# Patient Record
Sex: MALE | Race: BLACK OR AFRICAN AMERICAN | NOT HISPANIC OR LATINO | Employment: FULL TIME | URBAN - METROPOLITAN AREA
[De-identification: names, ages, dates, MRNs, and addresses within clinical notes are randomized per-mention and may not be internally consistent; named-entity substitution may affect disease eponyms.]

---

## 2017-05-03 DIAGNOSIS — I10 HYPERTENSION GOAL BP (BLOOD PRESSURE) < 140/90: ICD-10-CM

## 2017-05-03 RX ORDER — LISINOPRIL AND HYDROCHLOROTHIAZIDE 20; 25 MG/1; MG/1
TABLET ORAL
Qty: 30 TABLET | Refills: 0 | Status: SHIPPED | OUTPATIENT
Start: 2017-05-03 | End: 2017-05-19

## 2017-05-03 NOTE — TELEPHONE ENCOUNTER
Medication is being filled for 1 time refill only due to:  Patient needs to be seen because it has been more than one year since last visit.   Nereida OSEGUERA RN

## 2017-05-03 NOTE — TELEPHONE ENCOUNTER
Pending Prescriptions:                       Disp   Refills    lisinopril-hydrochlorothiazide (PRINZIDE/*90 tab*2            Sig: TAKE ONE TABLET BY MOUTH ONE TIME DAILY        Last Written Prescription Date: 5/2/2016  Last Fill Quantity: 90, # refills: 3  Last Office Visit with [facility], [facility] or [facility] prescribing provider: 5/2/2016       Potassium   Date Value Ref Range Status   05/02/2016 3.8 3.4 - 5.3 mmol/L Final     Creatinine   Date Value Ref Range Status   05/02/2016 0.81 0.66 - 1.25 mg/dL Final     BP Readings from Last 3 Encounters:   05/02/16 130/68   06/01/15 110/70   05/05/14 112/76

## 2017-05-19 ENCOUNTER — OFFICE VISIT (OUTPATIENT)
Dept: FAMILY MEDICINE | Facility: CLINIC | Age: 62
End: 2017-05-19
Payer: COMMERCIAL

## 2017-05-19 VITALS
TEMPERATURE: 97.2 F | WEIGHT: 198 LBS | HEIGHT: 68 IN | HEART RATE: 88 BPM | BODY MASS INDEX: 30.01 KG/M2 | DIASTOLIC BLOOD PRESSURE: 70 MMHG | OXYGEN SATURATION: 98 % | SYSTOLIC BLOOD PRESSURE: 120 MMHG | RESPIRATION RATE: 16 BRPM

## 2017-05-19 DIAGNOSIS — I10 HYPERTENSION GOAL BP (BLOOD PRESSURE) < 140/90: ICD-10-CM

## 2017-05-19 DIAGNOSIS — N52.1 ERECTILE DYSFUNCTION DUE TO DISEASES CLASSIFIED ELSEWHERE: ICD-10-CM

## 2017-05-19 DIAGNOSIS — Z86.0100 HISTORY OF COLONIC POLYPS: ICD-10-CM

## 2017-05-19 DIAGNOSIS — Z00.00 ROUTINE HISTORY AND PHYSICAL EXAMINATION OF ADULT: Primary | ICD-10-CM

## 2017-05-19 PROCEDURE — 86803 HEPATITIS C AB TEST: CPT | Performed by: FAMILY MEDICINE

## 2017-05-19 PROCEDURE — 36415 COLL VENOUS BLD VENIPUNCTURE: CPT | Performed by: FAMILY MEDICINE

## 2017-05-19 PROCEDURE — 99396 PREV VISIT EST AGE 40-64: CPT | Performed by: FAMILY MEDICINE

## 2017-05-19 PROCEDURE — 80069 RENAL FUNCTION PANEL: CPT | Performed by: FAMILY MEDICINE

## 2017-05-19 RX ORDER — SILDENAFIL 100 MG/1
50-100 TABLET, FILM COATED ORAL DAILY PRN
Qty: 6 TABLET | Refills: 2 | Status: SHIPPED | OUTPATIENT
Start: 2017-05-19 | End: 2020-07-06

## 2017-05-19 RX ORDER — LISINOPRIL AND HYDROCHLOROTHIAZIDE 20; 25 MG/1; MG/1
1 TABLET ORAL DAILY
Qty: 90 TABLET | Refills: 3 | Status: SHIPPED | OUTPATIENT
Start: 2017-05-19 | End: 2018-05-21

## 2017-05-19 NOTE — NURSING NOTE
"Chief Complaint   Patient presents with     Physical     initial /70 (BP Location: Left arm, Cuff Size: Adult Regular)  Pulse 88  Temp 97.2  F (36.2  C) (Oral)  Resp 16  Ht 5' 8.25\" (1.734 m)  Wt 198 lb (89.8 kg)  SpO2 98%  BMI 29.89 kg/m2 Estimated body mass index is 29.89 kg/(m^2) as calculated from the following:    Height as of this encounter: 5' 8.25\" (1.734 m).    Weight as of this encounter: 198 lb (89.8 kg).  BP completed using cuff size: regular.  L  arm      Health Maintenance that is potentially due pending provider review:  Will do colonoscopy this year, told him about the free classes for health care directive    n/a    Gabriel George ma  "

## 2017-05-19 NOTE — MR AVS SNAPSHOT
After Visit Summary   5/19/2017    Edd Bazan    MRN: [id number]           Patient Information     Date Of Birth          1955        Visit Information        Provider Department      5/19/2017 11:00 AM Tay Oshea MD [facility]        Today's Diagnoses     Routine history and physical examination of adult    -  1    Hypertension goal BP (blood pressure) < 140/90        History of colonic polyps        Erectile dysfunction due to diseases classified elsewhere          Care Instructions      Preventive Health Recommendations  Male Ages 50 - 64    Yearly exam:             See your health care provider every year in order to  o   Review health changes.   o   Discuss preventive care.    o   Review your medicines if your doctor has prescribed any.     Have a cholesterol test every 5 years, or more frequently if you are at risk for high cholesterol/heart disease.     Have a diabetes test (fasting glucose) every three years. If you are at risk for diabetes, you should have this test more often.     Have a colonoscopy at age 50, or have a yearly FIT test (stool test). These exams will check for colon cancer.      Talk with your health care provider about whether or not a prostate cancer screening test (PSA) is right for you.    You should be tested each year for STDs (sexually transmitted diseases), if you re at risk.     Shots: Get a flu shot each year. Get a tetanus shot every 10 years.     Nutrition:    Eat at least 5 servings of fruits and vegetables daily.     Eat whole-grain bread, whole-wheat pasta and brown rice instead of white grains and rice.     Talk to your provider about Calcium and Vitamin D.     Lifestyle    Exercise for at least 150 minutes a week (30 minutes a day, 5 days a week). This will help you control your weight and prevent disease.     Limit alcohol to one drink per day.     No smoking.     Wear sunscreen to prevent skin cancer.     See your  dentist every six months for an exam and cleaning.     See your eye doctor every 1 to 2 years.          Follow-ups after your visit        Additional Services     GASTROENTEROLOGY ADULT REF PROCEDURE ONLY       Last Lab Result: Creatinine (mg/dL)       Date                     Value                 05/02/2016               0.81             ----------  Body mass index is 29.89 kg/(m^2).     Needed:  No  Language:  English    Patient will be contacted to schedule procedure.     Please be aware that coverage of these services is subject to the terms and limitations of your health insurance plan.  Call member services at your health plan with any benefit or coverage questions.  Any procedures must be performed at a [place] facility OR coordinated by your clinic's referral office.    Please bring the following with you to your appointment:    (1) Any X-Rays, CTs or MRIs which have been performed.  Contact the facility where they were done to arrange for  prior to your scheduled appointment.    (2) List of current medications   (3) This referral request   (4) Any documents/labs given to you for this referral                  Who to contact     If you have questions or need follow up information about today's clinic visit or your schedule please contact [facility] directly at [phone].  Normal or non-critical lab and imaging results will be communicated to you by MyChart, letter or phone within 4 business days after the clinic has received the results. If you do not hear from us within 7 days, please contact the clinic through MyChart or phone. If you have a critical or abnormal lab result, we will notify you by phone as soon as possible.  Submit refill requests through [facility] or call your pharmacy and they will forward the refill request to us. Please allow 3 business days for your refill to be completed.          Additional Information About Your Visit        MyChart Information      "Trot lets you send messages to your doctor, view your test results, renew your prescriptions, schedule appointments and more. To sign up, go to [email or web address] . Click on \"Log in\" on the left side of the screen, which will take you to the Welcome page. Then click on \"Sign up Now\" on the right side of the page.     You will be asked to enter the access code listed below, as well as some personal information. Please follow the directions to create your username and password.     Your access code is: [email or web address]  Expires: 2017 11:33 AM     Your access code will  in 90 days. If you need help or a new code, please call your [place] clinic or [phone].        Care EveryWhere ID     This is your [facility] EveryWhere ID. This could be used by other organizations to access your [place] medical records  [id number]        Your Vitals Were     Pulse Temperature Respirations Height Pulse Oximetry BMI (Body Mass Index)    88 97.2  F (36.2  C) (Oral) 16 5' 8.25\" (1.734 m) 98% 29.89 kg/m2       Blood Pressure from Last 3 Encounters:   17 120/70   16 130/68   06/01/15 110/70    Weight from Last 3 Encounters:   17 198 lb (89.8 kg)   16 202 lb (91.6 kg)   06/01/15 199 lb 8 oz (90.5 kg)              We Performed the Following     GASTROENTEROLOGY ADULT REF PROCEDURE ONLY     Hepatitis C Screen Reflex to HCV RNA Quant and Genotype     Renal panel (Alb, BUN, Ca, Cl, CO2, Creat, Gluc, Phos, K, Na)          Today's Medication Changes          These changes are accurate as of: 17 11:33 AM.  If you have any questions, ask your nurse or doctor.               These medicines have changed or have updated prescriptions.        Dose/Directions    lisinopril-hydrochlorothiazide 20-25 MG per tablet   Commonly known as:  PRINZIDE/ZESTORETIC   This may have changed:  See the new instructions.   Used for:  Hypertension goal BP (blood pressure) < 140/90   Changed by:  Tay Oshea MD    "     Dose:  1 tablet   Take 1 tablet by mouth daily   Quantity:  90 tablet   Refills:  3            Where to get your medicines      These medications were sent to [facility] [address] [facility] - [place], [place] - [address]  [address], [place] [address]     Phone:  [phone]     lisinopril-hydrochlorothiazide 20-25 MG per tablet    sildenafil 100 MG cap/tab                Primary Care Provider Office Phone # Fax #    Tay Dennis Oshea [phone]       [facility] [address] [facility] [address]        Thank you!     Thank you for choosing [facility]  for your care. Our goal is always to provide you with excellent care. Hearing back from our patients is one way we can continue to improve our services. Please take a few minutes to complete the written survey that you may receive in the mail after your visit with us. Thank you!             Your Updated Medication List - Protect others around you: Learn how to safely use, store and throw away your medicines at www.disposemymeds.org.          This list is accurate as of: 5/19/17 11:33 AM.  Always use your most recent med list.                   Brand Name Dispense Instructions for use    aspirin 325 MG EC tablet      Take 325 mg by mouth daily.       COMPLETE MULTI-PURPOSE      1 daily       FIBER PO      Take  by mouth.       FISH OIL PO      Take  by mouth.       lisinopril-hydrochlorothiazide 20-25 MG per tablet    PRINZIDE/ZESTORETIC    90 tablet    Take 1 tablet by mouth daily       sildenafil 100 MG cap/tab    VIAGRA    6 tablet    Take 0.5-1 tablets ( mg) by mouth daily as needed for erectile dysfunction Take 30 min to 4 hours before intercourse.       STOOL SOFTENER PO      Take  by mouth.       Vitamin D 02602 UNIT Caps      every week on friday

## 2017-05-19 NOTE — PROGRESS NOTES
SUBJECTIVE:     CC: Edd Bazan is an 62 year old male who presents for preventative health visit.     Healthy Habits:    Do you get at least three servings of calcium containing foods daily (dairy, green leafy vegetables, etc.)? yes    Amount of exercise or daily activities, outside of work: 4-5 day(s) per week    Problems taking medications regularly No    Medication side effects: No    Have you had an eye exam in the past two years? yes    Do you see a dentist twice per year? yes    Do you have sleep apnea, excessive snoring or daytime drowsiness?no      PROBLEMS TO ADD ON...    Today's PHQ-2 Score:   PHQ-2 ( 1999 [employer]) 5/19/2017 6/1/2015   Q1: Little interest or pleasure in doing things 0 0   Q2: Feeling down, depressed or hopeless 0 0   PHQ-2 Score 0 0       Abuse: Current or Past(Physical, Sexual or Emotional)- No  Do you feel safe in your environment - Yes    Social History   Substance Use Topics     Smoking status: Current Some Day Smoker     Types: Cigars     Smokeless tobacco: Never Used      Comment: occ     Alcohol use Yes      Comment: a few times a week     The patient does not drink >3 drinks per day nor >7 drinks per week.    Last PSA: No results found for: PSA    Recent Labs   Lab Test  06/11/12   0920  05/31/11   1647   CHOL  193  201*   HDL  49  48   LDL  114   --    TRIG  150   --    CHOLHDLRATIO  3.9   --        Reviewed orders with patient. Reviewed health maintenance and updated orders accordingly - Yes    Reviewed and updated as needed this visit by clinical staff  Tobacco  Allergies  Meds         Reviewed and updated as needed this visit by Provider            STD screening:  History   Sexual Activity     Sexual activity: No     no screening desired today    Overall patient has been feeling well and needs follow-up for history of colon polyps as well as recheck for high blood pressure medication which has been stable    ROS: As per HPI.  Constitutional: no recent illness, no  fevers/sweats/chills, sleep normal  Eyes: No vision changes or eye irritation  Ears/Nose/Throat: No runny nose, sore throat or ear pain  CV: no palpitations, no chest pain, no lower extremity swelling.  Resp: no shortness of breath, wheezing, or cough.  GI: no nausea/vomiting/diarrhea, normal stooling pattern, no reflux symptoms, no black or bloody stools  : no burning or urgency with urination, no blood in urine, no sores or discharge.  Skin: no changing moles or other lesions, no rash  Musculoskeletal: no joint pain, muscle pain, weakness, trauma or injury  Psych: no depression, no concerns about anxiety  Neuro: no new headaches, dizziness    I have reviewed and updated the patient's past medical, social and family history in the EMR. Current problems are:  Patient Active Problem List    Diagnosis Date Noted     Erectile dysfunction due to diseases classified elsewhere 05/02/2016     Priority: Medium     Impingement syndrome of shoulder region 06/01/2015     Priority: Medium     Advanced directives, counseling/discussion 05/29/2012     Priority: Medium     Discussed advance care planning with patient; however, patient declined at this time. 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     Priority: Medium     Family Hx:  Family History   Problem Relation Age of Onset     HEART DISEASE Father      Breast Cancer Mother      Social History:  Social History   Substance Use Topics     Smoking status: Current Some Day Smoker     Types: Cigars     Smokeless tobacco: Never Used      Comment: occ     Alcohol use Yes      Comment: a few times a week     Social History     Social History Narrative     Allergies:     Allergies   Allergen Reactions     No Known Drug Allergies       Current Medications:  Current Outpatient Prescriptions   Medication Sig  "Dispense Refill     lisinopril-hydrochlorothiazide (PRINZIDE/ZESTORETIC) 20-25 MG per tablet TAKE ONE TABLET BY MOUTH ONE TIME DAILY 30 tablet 0     sildenafil (VIAGRA) 100 MG tablet Take 0.5-1 tablets ( mg) by mouth daily as needed for erectile dysfunction Take 30 min to 4 hours before intercourse. 6 tablet 2     Omega-3 Fatty Acids (FISH OIL PO) Take  by mouth.       Docusate Calcium (STOOL SOFTENER PO) Take  by mouth.       aspirin 325 MG EC tablet Take 325 mg by mouth daily.       FIBER PO Take  by mouth.       COMPLETE MULTI-PURPOSE 1 daily       VITAMIN D 64234 UNIT OR CAPS every week on friday          Objective:  /70 (BP Location: Left arm, Cuff Size: Adult Regular)  Pulse 88  Temp 97.2  F (36.2  C) (Oral)  Resp 16  Ht 5' 8.25\" (1.734 m)  Wt 198 lb (89.8 kg)  SpO2 98%  BMI 29.89 kg/m2  General Appearance: Pleasant, alert, WN/WD in no acute respiratory distress.  Head Exam: Normal. Normocephalic, atraumatic. No sinus tenderness.  Eye Exam: Normal external eye, conjunctiva, lids. ARLEY.  Ear Exam: Normal auditory canals and external ears. Non-tender.  Left TM-normal. Right TM-normal.  OroPharynx Exam: Dental hygiene adequate. Normal buccal mucosa. Normal pharynx.  Neck Exam: Supple, no masses or enlarged, tender nodes.  Thyroid Exam: No nodules or enlargement or tenderness.  Chest/Respiratory Exam: Normal, comfortable, easy respirations. Chest wall normal. Lungs are clear to auscultation. No wheezing, crackles, or rhonchi.  Cardiovascular Exam: Regular rate and rhythm. No murmur, gallop, or rubs. No pedal edema.  Gastrointestinal Exam: Soft, non-tender, no masses or organomegaly.  Musculoskeletal Exam: Back is non-tender, full ROM of upper and lower extremities.  Skin: no rash, warm and dry.  Neurologic Exam: Nonfocal, no tremor. Normal gait.  Psychiatric Exam: Alert - appropriate, normal affect    ASSESSMENT/PLAN:    ICD-10-CM    1. Routine history and physical examination of adult Z00.00 " "Renal panel (Alb, BUN, Ca, Cl, CO2, Creat, Gluc, Phos, K, Na)     Hepatitis C Screen Reflex to HCV RNA Quant and Genotype   2. Hypertension goal BP (blood pressure) < 140/90 I10 lisinopril-hydrochlorothiazide (PRINZIDE/ZESTORETIC) 20-25 MG per tablet     Renal panel (Alb, BUN, Ca, Cl, CO2, Creat, Gluc, Phos, K, Na)     Hepatitis C Screen Reflex to HCV RNA Quant and Genotype   3. History of colonic polyps Z86.010 GASTROENTEROLOGY ADULT REF PROCEDURE ONLY   4. Erectile dysfunction due to diseases classified elsewhere N52.1 sildenafil (VIAGRA) 100 MG cap/tab       COUNSELING:  Reviewed preventive health counseling, as reflected in patient instructions       Regular exercise       Healthy diet/nutrition       Consider Hep C screening for patients born between 1945 and 1965       Colon cancer screening       Prostate cancer screening   Discussed the risks and benefits of prostate cancer screening via PSA, current recommendation from the USPSTF is again screening  But this could be modified by individual risk or family history  Patient declined screening today    Also discussed screening for cholesterol patient declines  The ASCVD Risk score (Lynsey VICKY Jr, et al., 2013) failed to calculate for the following reasons:    Cannot find a previous HDL lab    Cannot find a previous total cholesterol lab         reports that he has been smoking Cigars.  He has never used smokeless tobacco.  Tobacco Cessation Action Plan: Information offered: Patient not interested at this time  Estimated body mass index is 29.89 kg/(m^2) as calculated from the following:    Height as of this encounter: 5' 8.25\" (1.734 m).    Weight as of this encounter: 198 lb (89.8 kg).   Weight management plan: Discussed healthy diet and exercise guidelines and patient will follow up in 12 months in clinic to re-evaluate.    Counseling Resources:  ATP IV Guidelines  Pooled Cohorts Equation Calculator  FRAX Risk Assessment  ICSI Preventive Guidelines  Dietary " Guidelines for Americans, 2010  USDA's MyPlate  ASA Prophylaxis  Lung CA Screening    Tay Dennis Oshea MD  [facility]

## 2017-05-19 NOTE — LETTER
[facility]  [address], [address]  [place], [place] [address] [phone]     May 22, 2017     Edd Bazan                                                                                                                             [address] [facility] [address]        Dear Edd,    It was nice to see you at your recent visit.  Enclosed are the lab results taken at that time.  Results for orders placed or performed in visit on 05/19/17   Renal panel (Alb, BUN, Ca, Cl, CO2, Creat, Gluc, Phos, K, Na)   Result Value Ref Range    Sodium 140 133 - 144 mmol/L    Potassium 3.9 3.4 - 5.3 mmol/L    Chloride 104 94 - 109 mmol/L    Carbon Dioxide 32 20 - 32 mmol/L    Anion Gap 4 3 - 14 mmol/L    Glucose 84 70 - 99 mg/dL    Urea Nitrogen 15 7 - 30 mg/dL    Creatinine 0.68 0.66 - 1.25 mg/dL    Calcium 9.1 8.5 - 10.1 mg/dL    Phosphorus 2.9 2.5 - 4.5 mg/dL    Albumin 4.0 3.4 - 5.0 g/dL   Hepatitis C Screen Reflex to HCV RNA Quant and Genotype   Result Value Ref Range    Hepatitis C Antibody  NR     Negative       Your test results are normal.  Follow up as needed or in 1 year.    Sincerely,    Tay Oshea MD MPH

## 2017-05-20 LAB
ALBUMIN SERPL-MCNC: 4 G/DL (ref 3.4–5)
ANION GAP SERPL CALCULATED.3IONS-SCNC: 4 MMOL/L (ref 3–14)
BUN SERPL-MCNC: 15 MG/DL (ref 7–30)
CALCIUM SERPL-MCNC: 9.1 MG/DL (ref 8.5–10.1)
CHLORIDE SERPL-SCNC: 104 MMOL/L (ref 94–109)
CO2 SERPL-SCNC: 32 MMOL/L (ref 20–32)
CREAT SERPL-MCNC: 0.68 MG/DL (ref 0.66–1.25)
GFR SERPL CREATININE-BSD FRML MDRD: NORMAL ML/MIN/1.7M2
GLUCOSE SERPL-MCNC: 84 MG/DL (ref 70–99)
PHOSPHATE SERPL-MCNC: 2.9 MG/DL (ref 2.5–4.5)
POTASSIUM SERPL-SCNC: 3.9 MMOL/L (ref 3.4–5.3)
SODIUM SERPL-SCNC: 140 MMOL/L (ref 133–144)

## 2017-05-22 LAB — HCV AB SERPL QL IA: NORMAL

## 2017-07-15 ENCOUNTER — HEALTH MAINTENANCE LETTER (OUTPATIENT)
Age: 62
End: 2017-07-15

## 2017-11-28 ENCOUNTER — TELEPHONE (OUTPATIENT)
Dept: FAMILY MEDICINE | Facility: CLINIC | Age: 62
End: 2017-11-28

## 2017-11-28 NOTE — TELEPHONE ENCOUNTER
11/28/2017    Call Regarding Preventive Health Screening Colonoscopy    Attempt 1    Message on voicemail     Comments:       Outreach   [place]

## 2018-01-12 NOTE — TELEPHONE ENCOUNTER
1/12/2018    Call Regarding Preventive Health Screening Colonoscopy    Attempt 2    Message on voicemail     Comments:           Outreach   [place]

## 2018-02-03 NOTE — TELEPHONE ENCOUNTER
2/3/2018    Call Regarding Preventive Health Screening Colonoscopy    Attempt 3    Message on voicemail     Comments:           Outreach   AT

## 2018-05-21 ENCOUNTER — OFFICE VISIT (OUTPATIENT)
Dept: FAMILY MEDICINE | Facility: CLINIC | Age: 63
End: 2018-05-21
Payer: COMMERCIAL

## 2018-05-21 VITALS
WEIGHT: 198.2 LBS | HEART RATE: 87 BPM | HEIGHT: 68 IN | BODY MASS INDEX: 30.04 KG/M2 | TEMPERATURE: 98 F | SYSTOLIC BLOOD PRESSURE: 123 MMHG | DIASTOLIC BLOOD PRESSURE: 78 MMHG | OXYGEN SATURATION: 98 %

## 2018-05-21 DIAGNOSIS — I10 HYPERTENSION GOAL BP (BLOOD PRESSURE) < 140/90: ICD-10-CM

## 2018-05-21 DIAGNOSIS — N52.1 ERECTILE DYSFUNCTION DUE TO DISEASES CLASSIFIED ELSEWHERE: ICD-10-CM

## 2018-05-21 DIAGNOSIS — Z12.11 SPECIAL SCREENING FOR MALIGNANT NEOPLASMS, COLON: ICD-10-CM

## 2018-05-21 DIAGNOSIS — Z00.00 ROUTINE HISTORY AND PHYSICAL EXAMINATION OF ADULT: Primary | ICD-10-CM

## 2018-05-21 LAB
ALBUMIN SERPL-MCNC: 4.2 G/DL (ref 3.4–5)
ANION GAP SERPL CALCULATED.3IONS-SCNC: 9 MMOL/L (ref 3–14)
BUN SERPL-MCNC: 20 MG/DL (ref 7–30)
CALCIUM SERPL-MCNC: 9.4 MG/DL (ref 8.5–10.1)
CHLORIDE SERPL-SCNC: 99 MMOL/L (ref 94–109)
CHOLEST SERPL-MCNC: 203 MG/DL
CO2 SERPL-SCNC: 28 MMOL/L (ref 20–32)
CREAT SERPL-MCNC: 0.83 MG/DL (ref 0.66–1.25)
GFR SERPL CREATININE-BSD FRML MDRD: >90 ML/MIN/1.7M2
GLUCOSE SERPL-MCNC: 90 MG/DL (ref 70–99)
HDLC SERPL-MCNC: 62 MG/DL
LDLC SERPL CALC-MCNC: 124 MG/DL
NONHDLC SERPL-MCNC: 141 MG/DL
PHOSPHATE SERPL-MCNC: 2.9 MG/DL (ref 2.5–4.5)
POTASSIUM SERPL-SCNC: 3.5 MMOL/L (ref 3.4–5.3)
SODIUM SERPL-SCNC: 136 MMOL/L (ref 133–144)
TRIGL SERPL-MCNC: 83 MG/DL

## 2018-05-21 PROCEDURE — 99396 PREV VISIT EST AGE 40-64: CPT | Performed by: FAMILY MEDICINE

## 2018-05-21 PROCEDURE — 36415 COLL VENOUS BLD VENIPUNCTURE: CPT | Performed by: FAMILY MEDICINE

## 2018-05-21 PROCEDURE — 80069 RENAL FUNCTION PANEL: CPT | Performed by: FAMILY MEDICINE

## 2018-05-21 PROCEDURE — 80061 LIPID PANEL: CPT | Performed by: FAMILY MEDICINE

## 2018-05-21 RX ORDER — LISINOPRIL AND HYDROCHLOROTHIAZIDE 20; 25 MG/1; MG/1
1 TABLET ORAL DAILY
Qty: 90 TABLET | Refills: 3 | Status: SHIPPED | OUTPATIENT
Start: 2018-05-21 | End: 2019-05-23

## 2018-05-21 RX ORDER — SILDENAFIL CITRATE 20 MG/1
TABLET ORAL
Qty: 50 TABLET | Refills: 11 | Status: SHIPPED | OUTPATIENT
Start: 2018-05-21 | End: 2019-05-23

## 2018-05-21 NOTE — MR AVS SNAPSHOT
After Visit Summary   5/21/2018    Edd Bazan    MRN: [id number]           Patient Information     Date Of Birth          1955        Visit Information        Provider Department      5/21/2018 9:30 AM Tay Oshea MD [facility]        Today's Diagnoses     Routine history and physical examination of adult    -  1    Hypertension goal BP (blood pressure) < 140/90        Special screening for malignant neoplasms, colon        Erectile dysfunction due to diseases classified elsewhere          Care Instructions      Preventive Health Recommendations  Male Ages 50 - 64    Yearly exam:             See your health care provider every year in order to  o   Review health changes.   o   Discuss preventive care.    o   Review your medicines if your doctor has prescribed any.     Have a cholesterol test every 5 years, or more frequently if you are at risk for high cholesterol/heart disease.     Have a diabetes test (fasting glucose) every three years. If you are at risk for diabetes, you should have this test more often.     Have a colonoscopy at age 50, or have a yearly FIT test (stool test). These exams will check for colon cancer.      Talk with your health care provider about whether or not a prostate cancer screening test (PSA) is right for you.    You should be tested each year for STDs (sexually transmitted diseases), if you re at risk.     Shots: Get a flu shot each year. Get a tetanus shot every 10 years.     Nutrition:    Eat at least 5 servings of fruits and vegetables daily.     Eat whole-grain bread, whole-wheat pasta and brown rice instead of white grains and rice.     Talk to your provider about Calcium and Vitamin D.     Lifestyle    Exercise for at least 150 minutes a week (30 minutes a day, 5 days a week). This will help you control your weight and prevent disease.     Limit alcohol to one drink per day.     No smoking.     Wear sunscreen to prevent skin  cancer.     See your dentist every six months for an exam and cleaning.     See your eye doctor every 1 to 2 years.            Follow-ups after your visit        Additional Services     GASTROENTEROLOGY ADULT REF PROCEDURE ONLY Marge Adame [phone]; Dr. KEELEY Mcnamara       Last Lab Result: Creatinine (mg/dL)       Date                     Value                 05/19/2017               0.68             ----------  Body mass index is 29.92 kg/(m^2).      Patient will be contacted to schedule procedure.     Please be aware that coverage of these services is subject to the terms and limitations of your health insurance plan.  Call member services at your health plan with any benefit or coverage questions.  Any procedures must be performed at a [facility] facility OR coordinated by your clinic's referral office.    Please bring the following with you to your appointment:    (1) Any X-Rays, CTs or MRIs which have been performed.  Contact the facility where they were done to arrange for  prior to your scheduled appointment.    (2) List of current medications   (3) This referral request   (4) Any documents/labs given to you for this referral                  Follow-up notes from your care team     Return in about 1 year (around 5/21/2019), or if symptoms worsen or fail to improve.      Your next 10 appointments already scheduled     Jul 17, 2018   Procedure with Nicholas Mcnamara MD   [facility] ([facility])    [address]  [place] [id number] [phone]           [facility] is located at [address] [place]              Who to contact     If you have questions or need follow up information about today's clinic visit or your schedule please contact [facility] directly at [phone].  Normal or non-critical lab and imaging results will be communicated to you by MyChart, letter or phone within 4 business days after the clinic has  "received the results. If you do not hear from us within 7 days, please contact the clinic through [employer] or phone. If you have a critical or abnormal lab result, we will notify you by phone as soon as possible.  Submit refill requests through [employer] or call your pharmacy and they will forward the refill request to us. Please allow 3 business days for your refill to be completed.          Additional Information About Your Visit        [employer] Information     [employer] lets you send messages to your doctor, view your test results, renew your prescriptions, schedule appointments and more. To sign up, go to [email or web address] . Click on \"Log in\" on the left side of the screen, which will take you to the Welcome page. Then click on \"Sign up Now\" on the right side of the page.     You will be asked to enter the access code listed below, as well as some personal information. Please follow the directions to create your username and password.     Your access code is: [email or web address]  Expires: 2018 10:02 AM     Your access code will  in 90 days. If you need help or a new code, please call your [facility] clinic or [phone].        Care EveryWhere ID     This is your Care EveryWhere ID. This could be used by other organizations to access your [facility] medical records  [id number]        Your Vitals Were     Pulse Temperature Height Pulse Oximetry BMI (Body Mass Index)       87 98  F (36.7  C) (Oral) 5' 8.25\" (1.734 m) 98% 29.92 kg/m2        Blood Pressure from Last 3 Encounters:   18 123/78   17 120/70   16 130/68    Weight from Last 3 Encounters:   18 198 lb 3.2 oz (89.9 kg)   17 198 lb (89.8 kg)   16 202 lb (91.6 kg)              We Performed the Following     GASTROENTEROLOGY ADULT REF PROCEDURE ONLY Marge Adame [phone]; Dr. KEELEY Mcnamara     Lipid panel reflex to direct LDL Non-fasting     Renal panel (Alb, BUN, Ca, Cl, CO2, Creat, Gluc, Phos, K, Na)        "   Today's Medication Changes          These changes are accurate as of 5/21/18 11:59 PM.  If you have any questions, ask your nurse or doctor.               Start taking these medicines.        Dose/Directions    sildenafil 20 MG tablet   Commonly known as:  REVATIO   Used for:  Erectile dysfunction due to diseases classified elsewhere   Started by:  Tay Oshea MD        Take 2-5 tablets as needed for sexual intercourse   Quantity:  50 tablet   Refills:  11            Where to get your medicines      These medications were sent to [place] [address] [place], [place] - [facility]  [address], [place] [address]     Phone:  [phone]     lisinopril-hydrochlorothiazide 20-25 MG per tablet         Some of these will need a paper prescription and others can be bought over the counter.  Ask your nurse if you have questions.     Bring a paper prescription for each of these medications     sildenafil 20 MG tablet                Primary Care Provider Office Phone # Fax #    Tay Oshea [phone] [address] [facility] [address]        Equal Access to Services     [facility] : Hadii ankur ku hadasho Soomaali, waaxda luqadaha, qaybta kaalmada adeegyada, waxay idiin haynatanaeln meng pedro . So [facility] [phone].    ATENCIÓN: Si habla español, tiene a sinclair disposición servicios gratuitos de asistencia lingüística. Llame al [phone].    We comply with applicable federal civil rights laws and [place] laws. We do not discriminate on the basis of race, color, national origin, age, disability, sex, sexual orientation, or gender identity.            Thank you!     Thank you for choosing [facility]  for your care. Our goal is always to provide you with excellent care. Hearing back from our patients is one way we can continue to improve our services. Please take a few minutes to complete the written survey that you may receive in the  mail after your visit with us. Thank you!             Your Updated Medication List - Protect others around you: Learn how to safely use, store and throw away your medicines at www.disposemymeds.org.          This list is accurate as of 5/21/18 11:59 PM.  Always use your most recent med list.                   Brand Name Dispense Instructions for use Diagnosis    aspirin 325 MG EC tablet      Take 325 mg by mouth daily.        COMPLETE MULTI-PURPOSE      1 daily        FIBER PO      Take  by mouth.        FISH OIL PO      Take  by mouth.        lisinopril-hydrochlorothiazide 20-25 MG per tablet    PRINZIDE/ZESTORETIC    90 tablet    Take 1 tablet by mouth daily    Hypertension goal BP (blood pressure) < 140/90       sildenafil 100 MG tablet    VIAGRA    6 tablet    Take 0.5-1 tablets ( mg) by mouth daily as needed for erectile dysfunction Take 30 min to 4 hours before intercourse.    Erectile dysfunction due to diseases classified elsewhere       sildenafil 20 MG tablet    REVATIO    50 tablet    Take 2-5 tablets as needed for sexual intercourse    Erectile dysfunction due to diseases classified elsewhere       STOOL SOFTENER PO      Take  by mouth.        Vitamin D 04343 UNIT Caps      every week on friday

## 2018-05-21 NOTE — LETTER
Edd Bazan  [address] [facility] [address]      Dear Edd    It was nice to see you at your recent visit.  Enclosed are the lab results taken at that time.  Your test results are normal.  Follow up as needed or in 1 year.    Sincerely,    Tay Oshea MD MPH

## 2018-05-21 NOTE — NURSING NOTE
"Chief Complaint   Patient presents with     Physical     /78  Pulse 87  Temp 98  F (36.7  C) (Oral)  Ht 5' 8.25\" (1.734 m)  Wt 198 lb 3.2 oz (89.9 kg)  SpO2 98%  BMI 29.92 kg/m2 Estimated body mass index is 29.92 kg/(m^2) as calculated from the following:    Height as of this encounter: 5' 8.25\" (1.734 m).    Weight as of this encounter: 198 lb 3.2 oz (89.9 kg).  Medication Reconciliation: complete      Health Maintenance that is potentially due pending provider review:  Colonoscopy/FIT    Pt will schedule Colonoscopy/FIT appt.    JONI Wells  "

## 2018-05-21 NOTE — PROGRESS NOTES
SUBJECTIVE:   CC: Edd Bazan is an 63 year old male who presents for preventative health visit.     Physical   Annual:     Getting at least 3 servings of Calcium per day::  Yes    Bi-annual eye exam::  Yes    Dental care twice a year::  NO    Sleep apnea or symptoms of sleep apnea::  None    Diet::  Regular (no restrictions)    Frequency of exercise::  1 day/week    Duration of exercise::  15-30 minutes    Taking medications regularly::  Yes    Medication side effects::  None    Additional concerns today::  No                  Today's PHQ-2 Score:   PHQ-2 ( 1999 [employer]) 5/21/2018   Q1: Little interest or pleasure in doing things 0   Q2: Feeling down, depressed or hopeless 0   PHQ-2 Score 0   Q1: Little interest or pleasure in doing things Not at all   Q2: Feeling down, depressed or hopeless Not at all   PHQ-2 Score 0       Abuse: Current or Past(Physical, Sexual or Emotional)- No  Do you feel safe in your environment - Yes    Social History   Substance Use Topics     Smoking status: Current Some Day Smoker     Types: Cigars     Smokeless tobacco: Never Used      Comment: occ     Alcohol use Yes      Comment: a few times a week     Alcohol Use 5/21/2018   If you drink alcohol do you typically have greater than 3 drinks per day OR greater than 7 drinks per week? No   No flowsheet data found.    Last PSA: No results found for: PSA    Reviewed orders with patient. Reviewed health maintenance and updated orders accordingly - Yes  Labs reviewed in EPIC  BP Readings from Last 3 Encounters:   05/21/18 123/78   05/19/17 120/70   05/02/16 130/68    Wt Readings from Last 3 Encounters:   05/21/18 198 lb 3.2 oz (89.9 kg)   05/19/17 198 lb (89.8 kg)   05/02/16 202 lb (91.6 kg)                  Patient Active Problem List   Diagnosis     Heterotopic tissue     CARDIOVASCULAR SCREENING; LDL GOAL LESS THAN 130     Hypertension goal BP (blood pressure) < 140/90     Advanced directives, counseling/discussion     Impingement  syndrome of shoulder region     Erectile dysfunction due to diseases classified elsewhere     No past surgical history on file.    Social History   Substance Use Topics     Smoking status: Current Some Day Smoker     Types: Cigars     Smokeless tobacco: Never Used      Comment: occ     Alcohol use Yes      Comment: a few times a week     Family History   Problem Relation Age of Onset     HEART DISEASE Father      Breast Cancer Mother          Current Outpatient Prescriptions   Medication Sig Dispense Refill     aspirin 325 MG EC tablet Take 325 mg by mouth daily.       COMPLETE MULTI-PURPOSE 1 daily       Docusate Calcium (STOOL SOFTENER PO) Take  by mouth.       FIBER PO Take  by mouth.       lisinopril-hydrochlorothiazide (PRINZIDE/ZESTORETIC) 20-25 MG per tablet Take 1 tablet by mouth daily 90 tablet 3     Omega-3 Fatty Acids (FISH OIL PO) Take  by mouth.       sildenafil (REVATIO) 20 MG tablet Take 2-5 tablets as needed for sexual intercourse 50 tablet 11     sildenafil (VIAGRA) 100 MG cap/tab Take 0.5-1 tablets ( mg) by mouth daily as needed for erectile dysfunction Take 30 min to 4 hours before intercourse. 6 tablet 2     VITAMIN D 83787 UNIT OR CAPS every week on friday       Allergies   Allergen Reactions     No Known Drug Allergies        Reviewed and updated as needed this visit by clinical staff         Reviewed and updated as needed this visit by Provider            Review of Systems  CONSTITUTIONAL: NEGATIVE for fever, chills, change in weight  INTEGUMENTARY/SKIN: NEGATIVE for worrisome rashes, moles or lesions  EYES: NEGATIVE for vision changes or irritation  ENT: NEGATIVE for ear, mouth and throat problems  RESP: NEGATIVE for significant cough or SOB  CV: NEGATIVE for chest pain, palpitations or peripheral edema  GI: NEGATIVE for nausea, abdominal pain, heartburn, or change in bowel habits   male: negative for dysuria, hematuria, decreased urinary stream, erectile dysfunction, urethral  "discharge  MUSCULOSKELETAL: NEGATIVE for significant arthralgias or myalgia  NEURO: NEGATIVE for weakness, dizziness or paresthesias  PSYCHIATRIC: NEGATIVE for changes in mood or affect    OBJECTIVE:   /78  Pulse 87  Temp 98  F (36.7  C) (Oral)  Ht 5' 8.25\" (1.734 m)  Wt 198 lb 3.2 oz (89.9 kg)  SpO2 98%  BMI 29.92 kg/m2    Physical Exam    General Appearance: Pleasant, alert, in no acute respiratory distress.  Head Exam: Normal. Normocephalic, atraumatic. No sinus tenderness.  Eye Exam: Normal external eye, conjunctiva, lids. ARLEY.  Ear Exam: Normal auditory canals and external ears. Non-tender.  Left TM-normal. Right TM-normal.  OroPharynx Exam: Dental hygiene adequate. Normal buccal mucosa. Normal pharynx.  Neck Exam: Supple, no masses or enlarged, tender nodes.  Thyroid Exam: No nodules or enlargement or tenderness.  Chest/Respiratory Exam: Normal, comfortable, easy respirations. Chest wall normal. Lungs are clear to auscultation. No wheezing, crackles, or rhonchi.  Cardiovascular Exam: Regular rate and rhythm. No murmur, gallop, or rubs. No pedal edema.  Gastrointestinal Exam: Soft, non-tender, no masses or organomegaly.  Musculoskeletal Exam: Back is non-tender, full ROM of upper and lower extremities.  Skin: no rash, warm and dry.    Neurologic Exam: Nonfocal, no tremor. Normal gait.  Psychiatric Exam: Alert - appropriate, normal affect      ASSESSMENT/PLAN:       ICD-10-CM    1. Routine history and physical examination of adult Z00.00    2. Hypertension goal BP (blood pressure) < 140/90 I10 lisinopril-hydrochlorothiazide (PRINZIDE/ZESTORETIC) 20-25 MG per tablet     Renal panel (Alb, BUN, Ca, Cl, CO2, Creat, Gluc, Phos, K, Na)     Lipid panel reflex to direct LDL Non-fasting   3. Special screening for malignant neoplasms, colon Z12.11 GASTROENTEROLOGY ADULT REF PROCEDURE ONLY Justinraul Adame [phone]; Dr. KEELEY Mcnamara   4. Erectile dysfunction due to diseases classified elsewhere N52.1 " "sildenafil (REVATIO) 20 MG tablet     Stable erectile dysfunction, related to HTN discussed the use of sildenafil tablets    Stable hypertension    COUNSELING:   Reviewed preventive health counseling, as reflected in patient instructions       Regular exercise       Healthy diet/nutrition       Safe sex practices/STD prevention         reports that he has been smoking Cigars.  He has never used smokeless tobacco.  Tobacco Cessation Action Plan: Information offered: Patient not interested at this time  Estimated body mass index is 29.89 kg/(m^2) as calculated from the following:    Height as of 5/19/17: 5' 8.25\" (1.734 m).    Weight as of 5/19/17: 198 lb (89.8 kg).   Weight management plan: Discussed healthy diet and exercise guidelines and patient will follow up in 12 months in clinic to re-evaluate.    Counseling Resources:  ATP IV Guidelines  Pooled Cohorts Equation Calculator  FRAX Risk Assessment  ICSI Preventive Guidelines  Dietary Guidelines for Americans, 2010  USDA's MyPlate  ASA Prophylaxis  Lung CA Screening    Tay Oshea MD  [facility]  "

## 2018-07-17 ENCOUNTER — HOSPITAL ENCOUNTER (OUTPATIENT)
Facility: CLINIC | Age: 63
Discharge: HOME | End: 2018-07-17
Attending: SPECIALIST | Admitting: SPECIALIST
Payer: COMMERCIAL

## 2018-07-17 ENCOUNTER — SURGERY (OUTPATIENT)
Age: 63
End: 2018-07-17

## 2018-07-17 ENCOUNTER — TRANSFERRED RECORDS (OUTPATIENT)
Dept: HEALTH INFORMATION MANAGEMENT | Facility: CLINIC | Age: 63
End: 2018-07-17

## 2018-07-17 VITALS
WEIGHT: 190 LBS | OXYGEN SATURATION: 100 % | HEIGHT: 69 IN | RESPIRATION RATE: 30 BRPM | BODY MASS INDEX: 28.14 KG/M2 | DIASTOLIC BLOOD PRESSURE: 80 MMHG | HEART RATE: 78 BPM | SYSTOLIC BLOOD PRESSURE: 119 MMHG

## 2018-07-17 LAB — COLONOSCOPY: NORMAL

## 2018-07-17 PROCEDURE — 99153 MOD SED SAME PHYS/QHP EA: CPT | Performed by: SPECIALIST

## 2018-07-17 PROCEDURE — 88305 TISSUE EXAM BY PATHOLOGIST: CPT | Performed by: SPECIALIST

## 2018-07-17 PROCEDURE — 25000128 H RX IP 250 OP 636: Performed by: SPECIALIST

## 2018-07-17 PROCEDURE — 45385 COLONOSCOPY W/LESION REMOVAL: CPT | Performed by: SPECIALIST

## 2018-07-17 PROCEDURE — 88305 TISSUE EXAM BY PATHOLOGIST: CPT | Mod: 26 | Performed by: SPECIALIST

## 2018-07-17 PROCEDURE — G0500 MOD SEDAT ENDO SERVICE >5YRS: HCPCS | Performed by: SPECIALIST

## 2018-07-17 RX ORDER — LIDOCAINE 40 MG/G
CREAM TOPICAL
Status: DISCONTINUED | OUTPATIENT
Start: 2018-07-17 | End: 2018-07-17 | Stop reason: HOSPADM

## 2018-07-17 RX ORDER — ONDANSETRON 2 MG/ML
4 INJECTION INTRAMUSCULAR; INTRAVENOUS
Status: DISCONTINUED | OUTPATIENT
Start: 2018-07-17 | End: 2018-07-17 | Stop reason: HOSPADM

## 2018-07-17 RX ORDER — FENTANYL CITRATE 50 UG/ML
INJECTION, SOLUTION INTRAMUSCULAR; INTRAVENOUS PRN
Status: DISCONTINUED | OUTPATIENT
Start: 2018-07-17 | End: 2018-07-17 | Stop reason: HOSPADM

## 2018-07-17 RX ADMIN — FENTANYL CITRATE 50 MCG: 50 INJECTION, SOLUTION INTRAMUSCULAR; INTRAVENOUS at 08:39

## 2018-07-17 RX ADMIN — MIDAZOLAM 1 MG: 1 INJECTION INTRAMUSCULAR; INTRAVENOUS at 08:42

## 2018-07-17 RX ADMIN — FENTANYL CITRATE 50 MCG: 50 INJECTION, SOLUTION INTRAMUSCULAR; INTRAVENOUS at 08:42

## 2018-07-17 RX ADMIN — MIDAZOLAM 1 MG: 1 INJECTION INTRAMUSCULAR; INTRAVENOUS at 08:39

## 2018-07-17 NOTE — BRIEF OP NOTE
[facility] Brief Operative Note    Pre-operative diagnosis: screening   Post-operative diagnosis polyps, diverticulosis     Procedure: Procedure(s):  colonoscopy - Wound Class: II-Clean Contaminated   Surgeon(s): Surgeon(s) and Role:     * Nicholas Mcnamara MD - Primary   Estimated blood loss: * No values recorded between 7/17/2018 12:00 AM and 7/17/2018  9:17 AM *    Specimens:   ID Type Source Tests Collected by Time Destination   A : polyps x 2, hot snare Polyp Large Intestine, Cecum SURGICAL PATHOLOGY EXAM Pallavi Worthington RN 7/17/2018  9:00 AM    B : hot snare Polyp Large Intestine, Left/Descending SURGICAL PATHOLOGY EXAM Pallavi Worthington RN 7/17/2018  9:06 AM       Findings: Please see ProVation procedure note in Chart Review

## 2018-07-18 LAB — COPATH REPORT: NORMAL

## 2019-05-23 ENCOUNTER — OFFICE VISIT (OUTPATIENT)
Dept: FAMILY MEDICINE | Facility: CLINIC | Age: 64
End: 2019-05-23
Payer: COMMERCIAL

## 2019-05-23 VITALS
HEART RATE: 93 BPM | OXYGEN SATURATION: 98 % | SYSTOLIC BLOOD PRESSURE: 119 MMHG | TEMPERATURE: 97.9 F | HEIGHT: 69 IN | BODY MASS INDEX: 30.66 KG/M2 | WEIGHT: 207 LBS | DIASTOLIC BLOOD PRESSURE: 77 MMHG | RESPIRATION RATE: 17 BRPM

## 2019-05-23 DIAGNOSIS — Z00.00 ROUTINE HISTORY AND PHYSICAL EXAMINATION OF ADULT: Primary | ICD-10-CM

## 2019-05-23 DIAGNOSIS — Z13.220 LIPID SCREENING: ICD-10-CM

## 2019-05-23 DIAGNOSIS — I10 HYPERTENSION GOAL BP (BLOOD PRESSURE) < 140/90: ICD-10-CM

## 2019-05-23 DIAGNOSIS — E78.5 HYPERLIPIDEMIA WITH TARGET LDL LESS THAN 100: ICD-10-CM

## 2019-05-23 DIAGNOSIS — N52.1 ERECTILE DYSFUNCTION DUE TO DISEASES CLASSIFIED ELSEWHERE: ICD-10-CM

## 2019-05-23 LAB
ANION GAP SERPL CALCULATED.3IONS-SCNC: 6 MMOL/L (ref 3–14)
BUN SERPL-MCNC: 15 MG/DL (ref 7–30)
CALCIUM SERPL-MCNC: 9.5 MG/DL (ref 8.5–10.1)
CHLORIDE SERPL-SCNC: 106 MMOL/L (ref 94–109)
CHOLEST SERPL-MCNC: 194 MG/DL
CO2 SERPL-SCNC: 27 MMOL/L (ref 20–32)
CREAT SERPL-MCNC: 1.08 MG/DL (ref 0.66–1.25)
GFR SERPL CREATININE-BSD FRML MDRD: 72 ML/MIN/{1.73_M2}
GLUCOSE SERPL-MCNC: 104 MG/DL (ref 70–99)
HDLC SERPL-MCNC: 61 MG/DL
LDLC SERPL CALC-MCNC: 116 MG/DL
NONHDLC SERPL-MCNC: 133 MG/DL
POTASSIUM SERPL-SCNC: 3.9 MMOL/L (ref 3.4–5.3)
SODIUM SERPL-SCNC: 139 MMOL/L (ref 133–144)
TRIGL SERPL-MCNC: 86 MG/DL

## 2019-05-23 PROCEDURE — 80061 LIPID PANEL: CPT | Performed by: FAMILY MEDICINE

## 2019-05-23 PROCEDURE — 36415 COLL VENOUS BLD VENIPUNCTURE: CPT | Performed by: FAMILY MEDICINE

## 2019-05-23 PROCEDURE — 99397 PER PM REEVAL EST PAT 65+ YR: CPT | Performed by: FAMILY MEDICINE

## 2019-05-23 PROCEDURE — 80048 BASIC METABOLIC PNL TOTAL CA: CPT | Performed by: FAMILY MEDICINE

## 2019-05-23 RX ORDER — SILDENAFIL CITRATE 20 MG/1
TABLET ORAL
Qty: 50 TABLET | Refills: 11 | Status: SHIPPED | OUTPATIENT
Start: 2019-05-23 | End: 2020-07-06

## 2019-05-23 RX ORDER — LISINOPRIL AND HYDROCHLOROTHIAZIDE 20; 25 MG/1; MG/1
1 TABLET ORAL DAILY
Qty: 90 TABLET | Refills: 3 | Status: SHIPPED | OUTPATIENT
Start: 2019-05-23 | End: 2020-05-12

## 2019-05-23 RX ORDER — ATORVASTATIN CALCIUM 10 MG/1
10 TABLET, FILM COATED ORAL DAILY
Qty: 90 TABLET | Refills: 3 | Status: SHIPPED | OUTPATIENT
Start: 2019-05-23 | End: 2020-05-12

## 2019-05-23 ASSESSMENT — MIFFLIN-ST. JEOR: SCORE: 1714.33

## 2019-05-23 ASSESSMENT — ACTIVITIES OF DAILY LIVING (ADL): CURRENT_FUNCTION: NO ASSISTANCE NEEDED

## 2019-05-23 NOTE — PROGRESS NOTES
"SUBJECTIVE:   Edd Bazan is a 64 year old male who presents for Preventive Visit.      Are you in the first 12 months of your Medicare coverage?  No    Healthy Habits:     In general, how would you rate your overall health?  Good    Frequency of exercise:  4-5 days/week    Duration of exercise:  30-45 minutes    Do you usually eat at least 4 servings of fruit and vegetables a day, include whole grains    & fiber and avoid regularly eating high fat or \"junk\" foods?  Yes    Taking medications regularly:  Yes    Medication side effects:  None    Ability to successfully perform activities of daily living:  No assistance needed    Home Safety:  No safety concerns identified    Hearing Impairment:  No hearing concerns    In the past 6 months, have you been bothered by leaking of urine?  No    In general, how would you rate your overall mental or emotional health?  Good      PHQ-2 Total Score: 0    Additional concerns today:  No    Do you feel safe in your environment? Yes    Do you have a Health Care Directive? No: Advance care planning reviewed with patient; information given to patient to review.    Fall risk       Cognitive Screening   1) Repeat 3 items (Leader, Season, Table)    2) Clock draw: NORMAL  3) 3 item recall: Recalls 3 objects  Results: 3 items recalled: COGNITIVE IMPAIRMENT LESS LIKELY    Mini-CogTM Copyright S Patt. Licensed by the author for use in [facility]; reprinted with permission ([email or web address]@.[email or web address]). All rights reserved.      Do you have sleep apnea, excessive snoring or daytime drowsiness?: no    Reviewed and updated as needed this visit by clinical staff         Reviewed and updated as needed this visit by Provider        Social History     Tobacco Use     Smoking status: Current Some Day Smoker     Types: Cigars     Smokeless tobacco: Never Used     Tobacco comment: occ   Substance Use Topics     Alcohol use: Yes     Comment: a few times a week         Alcohol Use 5/21/2018 "   Prescreen: >3 drinks/day or >7 drinks/week? No   Prescreen: >3 drinks/day or >7 drinks/week? -       Has stable HTN  Needs a refill  The 10-year ASCVD risk score (Summit Argolizeth PERRY Jr., et al., 2013) is: 22%    Values used to calculate the score:      Age: 64 years      Sex: Male      Is Non- : Yes      Diabetic: No      Tobacco smoker: Yes      Systolic Blood Pressure: 119 mmHg      Is BP treated: Yes      HDL Cholesterol: 62 mg/dL      Total Cholesterol: 203 mg/dL    I recommend he add a statin for primary prevention, he will try it      Current providers sharing in care for this patient include:   Patient Care Team:  Tay Oshea MD as PCP - General (Family Practice)  Tay Oshea MD as Assigned PCP    The following health maintenance items are reviewed in Epic and correct as of today:  Health Maintenance   Topic Date Due     FALL RISK ASSESSMENT  1955     HIV SCREENING  05/02/1969     ZOSTER IMMUNIZATION (1 of 2) 05/02/2004     LIPID  04/16/2008     ADVANCED DIRECTIVE PLANNING  05/29/2017     DTAP/TDAP/TD IMMUNIZATION (2 - Td) 09/17/2018     PHQ-2  01/01/2019     PNEUMOCOCCAL IMMUNIZATION 65+ LOW/MEDIUM RISK (1 of 2 - PCV13) 05/02/2019     AORTIC ANEURYSM SCREENING (SYSTEM ASSIGNED)  05/02/2019     MEDICARE ANNUAL WELLNESS VISIT  05/21/2019     INFLUENZA VACCINE (Season Ended) 09/01/2019     COLONOSCOPY  07/17/2021     HEPATITIS C SCREENING  Completed     IPV IMMUNIZATION  Aged Out     MENINGITIS IMMUNIZATION  Aged Out     Lab work is in process  Labs reviewed in EPIC  BP Readings from Last 3 Encounters:   05/23/19 119/77   07/17/18 119/80   05/21/18 123/78    Wt Readings from Last 3 Encounters:   05/23/19 93.9 kg (207 lb)   07/17/18 86.2 kg (190 lb)   05/21/18 89.9 kg (198 lb 3.2 oz)                  Patient Active Problem List   Diagnosis     Heterotopic tissue     CARDIOVASCULAR SCREENING; LDL GOAL LESS THAN 130     Hypertension goal BP (blood pressure) < 140/90      Advanced directives, counseling/discussion     Impingement syndrome of shoulder region     Erectile dysfunction due to diseases classified elsewhere     History reviewed. No pertinent surgical history.    Social History     Tobacco Use     Smoking status: Current Some Day Smoker     Types: Cigars     Smokeless tobacco: Never Used     Tobacco comment: occ   Substance Use Topics     Alcohol use: Yes     Comment: a few times a week     Family History   Problem Relation Age of Onset     Heart Disease Father      Breast Cancer Mother          Current Outpatient Medications   Medication Sig Dispense Refill     aspirin 325 MG EC tablet Take 325 mg by mouth daily.       COMPLETE MULTI-PURPOSE 1 daily       Docusate Calcium (STOOL SOFTENER PO) Take  by mouth.       FIBER PO Take  by mouth.       lisinopril-hydrochlorothiazide (PRINZIDE/ZESTORETIC) 20-25 MG per tablet Take 1 tablet by mouth daily 90 tablet 3     Omega-3 Fatty Acids (FISH OIL PO) Take  by mouth.       sildenafil (REVATIO) 20 MG tablet Take 2-5 tablets as needed for sexual intercourse 50 tablet 11     sildenafil (VIAGRA) 100 MG cap/tab Take 0.5-1 tablets ( mg) by mouth daily as needed for erectile dysfunction Take 30 min to 4 hours before intercourse. 6 tablet 2     VITAMIN D 70247 UNIT OR CAPS every week on friday       Allergies   Allergen Reactions     No Known Drug Allergies      Immunization History   Administered Date(s) Administered     TDAP Vaccine (Adacel) 09/17/2008       Review of Systems    Constitutional: no recent illness, no fevers/sweats/chills  Eyes: No vision changes or eye irritation  Ears/Nose/Throat: No runny nose, sore throat or ear pain  CV: no palpitations, no chest pain, no lower extremity swelling.  Resp: no shortness of breath, wheezing, or cough.  GI: no nausea/vomiting/diarrhea, no black or bloody stools  : no burning or urgency with urination  Skin: no rash  Musculoskeletal: no joint pain, muscle pain, weakness  Psych:  "no depression, no concerns about anxiety  Neuro: no new headaches, dizziness, numbness, or tingling      OBJECTIVE:   /77   Pulse 93   Temp 97.9  F (36.6  C) (Oral)   Resp 17   Ht 1.753 m (5' 9\")   Wt 93.9 kg (207 lb)   SpO2 98%   BMI 30.57 kg/m   Estimated body mass index is 30.57 kg/m  as calculated from the following:    Height as of this encounter: 1.753 m (5' 9\").    Weight as of this encounter: 93.9 kg (207 lb).  Physical Exam    General Appearance: Pleasant, alert, in no acute respiratory distress.  Head Exam: Normal. Normocephalic, atraumatic. No sinus tenderness.  Eye Exam: Normal external eye, conjunctiva, lids. ARLEY.  Ear Exam: Normal auditory canals and external ears. Non-tender.  Left TM-normal. Right TM-normal.  OroPharynx Exam: Dental hygiene adequate. Normal buccal mucosa. Normal pharynx.  Neck Exam: Supple, no masses or enlarged, tender nodes.  Thyroid Exam: No nodules or enlargement or tenderness.  Chest/Respiratory Exam: Normal, comfortable, easy respirations. Chest wall normal. Lungs are clear to auscultation. No wheezing, crackles, or rhonchi.  Cardiovascular Exam: Regular rate and rhythm. No murmur, gallop, or rubs. No pedal edema.  Gastrointestinal Exam: Soft, non-tender, no masses or organomegaly.  Musculoskeletal Exam: Back is non-tender, full ROM of upper and lower extremities.  Skin: no rash, warm and dry.    Neurologic Exam: Nonfocal, no tremor. Normal gait.  Psychiatric Exam: Alert - appropriate, normal affect      ASSESSMENT / PLAN:       ICD-10-CM    1. Routine history and physical examination of adult Z00.00    2. Hypertension goal BP (blood pressure) < 140/90 I10        End of Life Planning:  Patient currently has an advanced directive: Yes.  Practitioner is supportive of decision.    COUNSELING:  Reviewed preventive health counseling, as reflected in patient instructions       Regular exercise       Healthy diet/nutrition       Colon cancer screening       Prostate " "cancer screening    Estimated body mass index is 28.06 kg/m  as calculated from the following:    Height as of 7/17/18: 1.753 m (5' 9\").    Weight as of 7/17/18: 86.2 kg (190 lb).    Weight management plan: Discussed healthy diet and exercise guidelines     reports that he has been smoking cigars.  He has never used smokeless tobacco.  Tobacco Cessation Action Plan: Information offered: Patient not interested at this time    Appropriate preventive services were discussed with this patient, including applicable screening as appropriate for cardiovascular disease, diabetes, osteopenia/osteoporosis, and glaucoma.  As appropriate for age/gender, discussed screening for colorectal cancer, prostate cancer, breast cancer, and cervical cancer. Checklist reviewing preventive services available has been given to the patient.    Reviewed patients plan of care and provided an AVS. The Intermediate Care Plan ( asthma action plan, low back pain action plan, and migraine action plan) for Edd meets the Care Plan requirement. This Care Plan has been established and reviewed with the Patient.    Counseling Resources:  ATP IV Guidelines  Pooled Cohorts Equation Calculator  Breast Cancer Risk Calculator  FRAX Risk Assessment  ICSI Preventive Guidelines  Dietary Guidelines for Americans, 2010  [employer]'s MyPlate  ASA Prophylaxis  Lung CA Screening    Tay Oshea MD  [facility]    Identified Health Risks:  "

## 2019-05-23 NOTE — LETTER
Edd Bazan  [address] [facility] [address]      Dear Edd    It was nice to see you at your recent visit.  Enclosed are the lab results taken at that time.  Your risk for heart attack is high, mainly due to your smoking history  The 10-year ASCVD risk score (Lynsey PERRY Jr., et al., 2013) is: 21.8%    Values used to calculate the score:      Age: 64 years      Sex: Male      Is Non- : Yes      Diabetic: No      Tobacco smoker: Yes      Systolic Blood Pressure: 119 mmHg      Is BP treated: Yes      HDL Cholesterol: 61 mg/dL      Total Cholesterol: 194 mg/dL    Your test results are otherwise normal.  Follow up as needed or in 1 year.    Sincerely,    Tay Oshea MD MPH

## 2019-05-23 NOTE — PATIENT INSTRUCTIONS
Patient Education   Personalized Prevention Plan  You are due for the preventive services outlined below.  Your care team is available to assist you in scheduling these services.  If you have already completed any of these items, please share that information with your care team to update in your medical record.  Health Maintenance Due   Topic Date Due     FALL RISK ASSESSMENT  1955     One-time HIV Screening  05/02/1969     Zoster (Shingles) Vaccine (1 of 2) 05/02/2004     Cholesterol Lab  04/16/2008     Discuss Advance Directive Planning  05/29/2017     Diptheria Tetanus Pertussis (DTAP/TDAP/TD) Vaccine (2 - Td) 09/17/2018     PHQ-2  01/01/2019     Pneumococcal Vaccine (1 of 2 - PCV13) 05/02/2019     AORTIC ANEURYSM SCREENING (SYSTEM ASSIGNED)  05/02/2019     Annual Wellness Visit  05/21/2019

## 2019-05-23 NOTE — NURSING NOTE
"Chief Complaint   Patient presents with     Physical     /77   Pulse 93   Temp 97.9  F (36.6  C) (Oral)   Resp 17   Ht 1.753 m (5' 9\")   Wt 93.9 kg (207 lb)   SpO2 98%   BMI 30.57 kg/m   Estimated body mass index is 30.57 kg/m  as calculated from the following:    Height as of this encounter: 1.753 m (5' 9\").    Weight as of this encounter: 93.9 kg (207 lb).  Medication Reconciliation: complete        Health Maintenance Due Pending Provider Review:  NONE    n/a    Soo Grimaldo MA  [facility]  [phone]  "

## 2020-05-11 DIAGNOSIS — E78.5 HYPERLIPIDEMIA WITH TARGET LDL LESS THAN 100: ICD-10-CM

## 2020-05-11 DIAGNOSIS — I10 HYPERTENSION GOAL BP (BLOOD PRESSURE) < 140/90: ICD-10-CM

## 2020-05-11 NOTE — TELEPHONE ENCOUNTER
Reason for call:  Other   Patient called regarding (reason for call): call back and prescription  Additional comments: Patient has scheduled his physical in July but will be in need of a refill for his blood pressure medications. He is inquiring if he can have a temporary refill or if he will need to schedule a visit.    Phone number to reach patient:  Cell number on file:    Telephone Information:   Mobile [phone]       Best Time:  any    Can we leave a detailed message on this number?  YES    Travel screening: Negative

## 2020-05-12 RX ORDER — LISINOPRIL AND HYDROCHLOROTHIAZIDE 20; 25 MG/1; MG/1
1 TABLET ORAL DAILY
Qty: 90 TABLET | Refills: 0 | Status: SHIPPED | OUTPATIENT
Start: 2020-05-12 | End: 2020-07-06

## 2020-05-12 RX ORDER — ATORVASTATIN CALCIUM 10 MG/1
10 TABLET, FILM COATED ORAL DAILY
Qty: 90 TABLET | Refills: 0 | Status: SHIPPED | OUTPATIENT
Start: 2020-05-12 | End: 2020-07-06

## 2020-05-12 NOTE — TELEPHONE ENCOUNTER
Prescription approved per [facility] Refill Protocol.    Samantha Lucero RN

## 2020-07-06 ENCOUNTER — OFFICE VISIT (OUTPATIENT)
Dept: FAMILY MEDICINE | Facility: CLINIC | Age: 65
End: 2020-07-06
Payer: COMMERCIAL

## 2020-07-06 VITALS
SYSTOLIC BLOOD PRESSURE: 106 MMHG | BODY MASS INDEX: 29.27 KG/M2 | TEMPERATURE: 97.6 F | HEART RATE: 91 BPM | OXYGEN SATURATION: 97 % | HEIGHT: 69 IN | DIASTOLIC BLOOD PRESSURE: 72 MMHG | WEIGHT: 197.6 LBS

## 2020-07-06 DIAGNOSIS — E78.5 HYPERLIPIDEMIA WITH TARGET LDL LESS THAN 100: ICD-10-CM

## 2020-07-06 DIAGNOSIS — Z23 NEED FOR VACCINATION: ICD-10-CM

## 2020-07-06 DIAGNOSIS — Z00.00 ENCOUNTER FOR MEDICARE ANNUAL WELLNESS EXAM: Primary | ICD-10-CM

## 2020-07-06 DIAGNOSIS — I10 HYPERTENSION GOAL BP (BLOOD PRESSURE) < 140/90: ICD-10-CM

## 2020-07-06 PROCEDURE — 99397 PER PM REEVAL EST PAT 65+ YR: CPT | Mod: 25 | Performed by: FAMILY MEDICINE

## 2020-07-06 PROCEDURE — 90732 PPSV23 VACC 2 YRS+ SUBQ/IM: CPT | Performed by: FAMILY MEDICINE

## 2020-07-06 PROCEDURE — 90471 IMMUNIZATION ADMIN: CPT | Performed by: FAMILY MEDICINE

## 2020-07-06 RX ORDER — LISINOPRIL AND HYDROCHLOROTHIAZIDE 20; 25 MG/1; MG/1
1 TABLET ORAL DAILY
Qty: 90 TABLET | Refills: 3 | Status: SHIPPED | OUTPATIENT
Start: 2020-07-06 | End: 2021-07-08

## 2020-07-06 RX ORDER — ATORVASTATIN CALCIUM 10 MG/1
10 TABLET, FILM COATED ORAL DAILY
Qty: 90 TABLET | Refills: 3 | Status: SHIPPED | OUTPATIENT
Start: 2020-07-06 | End: 2021-07-06

## 2020-07-06 ASSESSMENT — MIFFLIN-ST. JEOR: SCORE: 1666.69

## 2020-07-06 ASSESSMENT — ACTIVITIES OF DAILY LIVING (ADL): CURRENT_FUNCTION: NO ASSISTANCE NEEDED

## 2020-07-06 NOTE — PROGRESS NOTES
"SUBJECTIVE:   Edd Bazan is a 65 year old male who presents for Preventive Visit.    Are you in the first 12 months of your Medicare coverage?  No    Healthy Habits:    In general, how would you rate your overall health?  Good    Frequency of exercise:  2-3 days/week    Duration of exercise:  15-30 minutes    Do you usually eat at least 4 servings of fruit and vegetables a day, include whole grains    & fiber and avoid regularly eating high fat or \"junk\" foods?  Yes    Taking medications regularly:  Yes    Barriers to taking medications:  None    Medication side effects:  None    Ability to successfully perform activities of daily living:  No assistance needed    Home Safety:  No safety concerns identified    Hearing Impairment:  No hearing concerns    In the past 6 months, have you been bothered by leaking of urine?  No    In general, how would you rate your overall mental or emotional health?  Good      PHQ-2 Total Score:    Additional concerns today:  No    Do you feel safe in your environment? Yes    Have you ever done Advance Care Planning? (For example, a Health Directive, POLST, or a discussion with a medical provider or your loved ones about your wishes): No, advance care planning information given to patient to review.  Patient plans to discuss their wishes with loved ones or provider.        Fall risk  Fallen 2 or more times in the past year?: No  Any fall with injury in the past year?: No    Cognitive Screening   1) Repeat 3 items (Leader, Season, Table)    2) Clock draw: NORMAL  3) 3 item recall: Recalls 1 object   Results: NORMAL clock, 1-2 items recalled: COGNITIVE IMPAIRMENT LESS LIKELY    Mini-CogTM Copyright RICH Beltre. Licensed by the author for use in [facility]; reprinted with permission ([email or web address]). All rights reserved.      Do you have sleep apnea, excessive snoring or daytime drowsiness?: no    Reviewed and updated as needed this visit by clinical staff  Tobacco  Meds  "        Reviewed and updated as needed this visit by Provider        Social History     Tobacco Use     Smoking status: Current Some Day Smoker     Types: Cigars     Smokeless tobacco: Never Used     Tobacco comment: occ   Substance Use Topics     Alcohol use: Yes     Comment: a few times a week     If you drink alcohol do you typically have >3 drinks per day or >7 drinks per week? No    No flowsheet data found.            Current providers sharing in care for this patient include:   Patient Care Team:  Tay Oshea MD as PCP - General (Family Practice)  Tay Oshea MD as Assigned PCP    The following health maintenance items are reviewed in Epic and correct as of today:  Health Maintenance   Topic Date Due     ZOSTER IMMUNIZATION (1 of 2) 05/02/2004     ADVANCE CARE PLANNING  05/29/2017     DTAP/TDAP/TD IMMUNIZATION (2 - Td) 09/17/2018     FALL RISK ASSESSMENT  05/02/2019     PNEUMOCOCCAL IMMUNIZATION 65+ LOW/MEDIUM RISK (1 of 2 - PCV13) 05/02/2019     AORTIC ANEURYSM SCREENING (SYSTEM ASSIGNED)  05/02/2019     PHQ-2  01/01/2020     MEDICARE ANNUAL WELLNESS VISIT  05/23/2020     INFLUENZA VACCINE (1) 09/01/2020     COLORECTAL CANCER SCREENING  07/17/2021     LIPID  05/23/2024     HEPATITIS C SCREENING  Completed     IPV IMMUNIZATION  Aged Out     MENINGITIS IMMUNIZATION  Aged Out     Lab work is in process  Labs reviewed in EPIC  BP Readings from Last 3 Encounters:   07/06/20 106/72   05/23/19 119/77   07/17/18 119/80    Wt Readings from Last 3 Encounters:   07/06/20 89.6 kg (197 lb 9.6 oz)   05/23/19 93.9 kg (207 lb)   07/17/18 86.2 kg (190 lb)                  Patient Active Problem List   Diagnosis     Heterotopic tissue     CARDIOVASCULAR SCREENING; LDL GOAL LESS THAN 130     Hypertension goal BP (blood pressure) < 140/90     Advanced directives, counseling/discussion     Impingement syndrome of shoulder region     Erectile dysfunction due to diseases classified elsewhere     No past  "surgical history on file.    Social History     Tobacco Use     Smoking status: Current Some Day Smoker     Types: Cigars     Smokeless tobacco: Never Used     Tobacco comment: occ   Substance Use Topics     Alcohol use: Yes     Comment: a few times a week     Family History   Problem Relation Age of Onset     Heart Disease Father      Breast Cancer Mother          Current Outpatient Medications   Medication Sig Dispense Refill     aspirin 325 MG EC tablet Take 325 mg by mouth daily.       COMPLETE MULTI-PURPOSE 1 daily       Docusate Calcium (STOOL SOFTENER PO) Take  by mouth.       FIBER PO Take  by mouth.       lisinopril-hydrochlorothiazide (ZESTORETIC) 20-25 MG tablet Take 1 tablet by mouth daily 90 tablet 0     Omega-3 Fatty Acids (FISH OIL PO) Take  by mouth.       sildenafil (REVATIO) 20 MG tablet Take 2-5 tablets as needed for sexual intercourse 50 tablet 11     sildenafil (VIAGRA) 100 MG cap/tab Take 0.5-1 tablets ( mg) by mouth daily as needed for erectile dysfunction Take 30 min to 4 hours before intercourse. 6 tablet 2     VITAMIN D 08398 UNIT OR CAPS every week on friday       atorvastatin (LIPITOR) 10 MG tablet Take 1 tablet (10 mg) by mouth daily (Patient not taking: Reported on 7/6/2020) 90 tablet 0       Immunization History   Administered Date(s) Administered     TDAP Vaccine (Adacel) 09/17/2008         Review of Systems  Constitutional, HEENT, cardiovascular, pulmonary, GI, , musculoskeletal, neuro, skin, endocrine and psych systems are negative, except as otherwise noted.    OBJECTIVE:   /72   Pulse 91   Temp 97.6  F (36.4  C) (Tympanic)   Ht 1.753 m (5' 9\")   Wt 89.6 kg (197 lb 9.6 oz)   SpO2 97%   BMI 29.18 kg/m   Estimated body mass index is 29.18 kg/m  as calculated from the following:    Height as of this encounter: 1.753 m (5' 9\").    Weight as of this encounter: 89.6 kg (197 lb 9.6 oz).  Physical Exam    General Appearance: Pleasant, alert, in no acute respiratory " "distress.  Head Exam: Normal. Normocephalic, atraumatic. No sinus tenderness.  Eye Exam: Normal external eye, conjunctiva, lids. ARLEY.  Ear Exam: Normal auditory canals and external ears. Non-tender.  Left TM-normal. Right TM-normal.  OroPharynx Exam: Dental hygiene adequate. Normal buccal mucosa. Normal pharynx.  Neck Exam: Supple, no masses or enlarged, tender nodes.  Thyroid Exam: No nodules or enlargement or tenderness.  Chest/Respiratory Exam: Normal, comfortable, easy respirations. Chest wall normal. Lungs are clear to auscultation. No wheezing, crackles, or rhonchi.  Cardiovascular Exam: Regular rate and rhythm. No murmur, gallop, or rubs. No pedal edema.  Gastrointestinal Exam: Soft, non-tender, no masses or organomegaly.  Musculoskeletal Exam: Back is non-tender, full ROM of upper and lower extremities.  Skin: no rash, warm and dry.    Neurologic Exam: Nonfocal, no tremor. Normal gait.  Psychiatric Exam: Alert - appropriate, normal affect        ASSESSMENT / PLAN:       ICD-10-CM    1. Encounter for Medicare annual wellness exam  Z00.00    2. Hypertension goal BP (blood pressure) < 140/90  I10 lisinopril-hydrochlorothiazide (ZESTORETIC) 20-25 MG tablet     Comprehensive metabolic panel   3. Hyperlipidemia with target LDL less than 100  E78.5 atorvastatin (LIPITOR) 10 MG tablet     Lipid Profile (Chol, Trig, HDL, LDL calc)   4. Need for vaccination  Z23        COUNSELING:  Reviewed preventive health counseling, as reflected in patient instructions       Regular exercise       Healthy diet/nutrition    Estimated body mass index is 29.18 kg/m  as calculated from the following:    Height as of this encounter: 1.753 m (5' 9\").    Weight as of this encounter: 89.6 kg (197 lb 9.6 oz).       reports that he has been smoking cigars. He has never used smokeless tobacco.      Appropriate preventive services were discussed with this patient, including applicable screening as appropriate for cardiovascular disease, " diabetes, osteopenia/osteoporosis, and glaucoma.  As appropriate for age/gender, discussed screening for colorectal cancer, prostate cancer, breast cancer, and cervical cancer. Checklist reviewing preventive services available has been given to the patient.    Reviewed patients plan of care and provided an AVS. The  maryann Puga meets the Care Plan requirement. This Care Plan has been established and reviewed with the Patient.    Counseling Resources:  ATP IV Guidelines  Pooled Cohorts Equation Calculator  Breast Cancer Risk Calculator  FRAX Risk Assessment  ICSI Preventive Guidelines  Dietary Guidelines for Americans, 2010  USDA's MyPlate  ASA Prophylaxis  Lung CA Screening    Tay Dennis Oshea MD  [facility]    Identified Health Risks:

## 2020-07-06 NOTE — PATIENT INSTRUCTIONS
Patient Education   Personalized Prevention Plan  You are due for the preventive services outlined below.  Your care team is available to assist you in scheduling these services.  If you have already completed any of these items, please share that information with your care team to update in your medical record.  Health Maintenance Due   Topic Date Due     Zoster (Shingles) Vaccine (1 of 2) 05/02/2004     Discuss Advance Care Planning  05/29/2017     Diptheria Tetanus Pertussis (DTAP/TDAP/TD) Vaccine (2 - Td) 09/17/2018     FALL RISK ASSESSMENT  05/02/2019     Pneumococcal Vaccine (1 of 2 - PCV13) 05/02/2019     AORTIC ANEURYSM SCREENING (SYSTEM ASSIGNED)  05/02/2019     PHQ-2  01/01/2020     Annual Wellness Visit  05/23/2020

## 2020-07-20 DIAGNOSIS — E78.5 HYPERLIPIDEMIA WITH TARGET LDL LESS THAN 100: ICD-10-CM

## 2020-07-20 DIAGNOSIS — I10 HYPERTENSION GOAL BP (BLOOD PRESSURE) < 140/90: ICD-10-CM

## 2020-07-20 LAB
ALBUMIN SERPL-MCNC: 3.8 G/DL (ref 3.4–5)
ALP SERPL-CCNC: 53 U/L (ref 40–150)
ALT SERPL W P-5'-P-CCNC: 27 U/L (ref 0–70)
ANION GAP SERPL CALCULATED.3IONS-SCNC: 7 MMOL/L (ref 3–14)
AST SERPL W P-5'-P-CCNC: 12 U/L (ref 0–45)
BILIRUB SERPL-MCNC: 0.3 MG/DL (ref 0.2–1.3)
BUN SERPL-MCNC: 22 MG/DL (ref 7–30)
CALCIUM SERPL-MCNC: 8.9 MG/DL (ref 8.5–10.1)
CHLORIDE SERPL-SCNC: 104 MMOL/L (ref 94–109)
CHOLEST SERPL-MCNC: 147 MG/DL
CO2 SERPL-SCNC: 26 MMOL/L (ref 20–32)
CREAT SERPL-MCNC: 0.77 MG/DL (ref 0.66–1.25)
GFR SERPL CREATININE-BSD FRML MDRD: >90 ML/MIN/{1.73_M2}
GLUCOSE SERPL-MCNC: 79 MG/DL (ref 70–99)
HDLC SERPL-MCNC: 50 MG/DL
LDLC SERPL CALC-MCNC: 88 MG/DL
NONHDLC SERPL-MCNC: 97 MG/DL
POTASSIUM SERPL-SCNC: 3.7 MMOL/L (ref 3.4–5.3)
PROT SERPL-MCNC: 7.3 G/DL (ref 6.8–8.8)
SODIUM SERPL-SCNC: 137 MMOL/L (ref 133–144)
TRIGL SERPL-MCNC: 44 MG/DL

## 2020-07-20 PROCEDURE — 36415 COLL VENOUS BLD VENIPUNCTURE: CPT | Performed by: FAMILY MEDICINE

## 2020-07-20 PROCEDURE — 80053 COMPREHEN METABOLIC PANEL: CPT | Performed by: FAMILY MEDICINE

## 2020-07-20 PROCEDURE — 80061 LIPID PANEL: CPT | Performed by: FAMILY MEDICINE

## 2021-03-22 ENCOUNTER — IMMUNIZATION (OUTPATIENT)
Dept: NURSING | Facility: CLINIC | Age: 66
End: 2021-03-22
Payer: COMMERCIAL

## 2021-03-22 PROCEDURE — 91300 PR COVID VAC PFIZER DIL RECON 30 MCG/0.3 ML IM: CPT

## 2021-03-22 PROCEDURE — 0001A PR COVID VAC PFIZER DIL RECON 30 MCG/0.3 ML IM: CPT

## 2021-04-12 ENCOUNTER — IMMUNIZATION (OUTPATIENT)
Dept: NURSING | Facility: CLINIC | Age: 66
End: 2021-04-12
Attending: INTERNAL MEDICINE
Payer: COMMERCIAL

## 2021-04-12 PROCEDURE — 0002A PR COVID VAC PFIZER DIL RECON 30 MCG/0.3 ML IM: CPT

## 2021-04-12 PROCEDURE — 91300 PR COVID VAC PFIZER DIL RECON 30 MCG/0.3 ML IM: CPT

## 2021-05-31 ENCOUNTER — RECORDS - HEALTHEAST (OUTPATIENT)
Dept: ADMINISTRATIVE | Facility: CLINIC | Age: 66
End: 2021-05-31

## 2021-07-05 NOTE — PATIENT INSTRUCTIONS
Patient Education   Personalized Prevention Plan  You are due for the preventive services outlined below.  Your care team is available to assist you in scheduling these services.  If you have already completed any of these items, please share that information with your care team to update in your medical record.  Health Maintenance Due   Topic Date Due     ANNUAL REVIEW OF HM ORDERS  Never done     Zoster (Shingles) Vaccine (1 of 2) Never done     Diptheria Tetanus Pertussis (DTAP/TDAP/TD) Vaccine (2 - Td) 09/17/2018     AORTIC ANEURYSM SCREENING (SYSTEM ASSIGNED)  Never done     PHQ-2  01/01/2021     FALL RISK ASSESSMENT  07/06/2021     Colorectal Cancer Screening  07/17/2021

## 2021-07-05 NOTE — PROGRESS NOTES
"SUBJECTIVE:   Edd Bazan is a 66 year old male who presents for Preventive Visit.    Patient has been advised of split billing requirements and indicates understanding: Yes   Are you in the first 12 months of your Medicare coverage?  No    Healthy Habits:     In general, how would you rate your overall health?  Good    Frequency of exercise:  1 day/week    Duration of exercise:  15-30 minutes    Do you usually eat at least 4 servings of fruit and vegetables a day, include whole grains    & fiber and avoid regularly eating high fat or \"junk\" foods?  No    Taking medications regularly:  Yes    Medication side effects:  None    Ability to successfully perform activities of daily living:  No assistance needed    Home Safety:  No safety concerns identified    Hearing Impairment:  No hearing concerns    In the past 6 months, have you been bothered by leaking of urine?  No    In general, how would you rate your overall mental or emotional health?  Good      PHQ-2 Total Score: 0    Additional concerns today:  No    Do you feel safe in your environment? Yes    Have you ever done Advance Care Planning? (For example, a Health Directive, POLST, or a discussion with a medical provider or your loved ones about your wishes): No, advance care planning information given to patient to review.  Patient declined advance care planning discussion at this time.       Fall risk  Fallen 2 or more times in the past year?: No  Any fall with injury in the past year?: No    Cognitive Screening   1) Repeat 3 items (Leader, Season, Table)    2) Clock draw: NORMAL  3) 3 item recall: Recalls 3 objects  Results: 3 items recalled: COGNITIVE IMPAIRMENT LESS LIKELY    Mini-CogTM Isela Beltre. Licensed by the author for use in [facility]; reprinted with permission ([email or web address]). All rights reserved.      Do you have sleep apnea, excessive snoring or daytime drowsiness?: no    Reviewed and updated as needed this visit by " clinical staff  Tobacco  Allergies  Meds   Med Hx  Surg Hx  Fam Hx  Soc Hx        Reviewed and updated as needed this visit by Provider                Social History     Tobacco Use     Smoking status: Current Some Day Smoker     Types: Cigars     Smokeless tobacco: Never Used     Tobacco comment: occ   Substance Use Topics     Alcohol use: Yes     Comment: a few times a week     If you drink alcohol do you typically have >3 drinks per day or >7 drinks per week? No    Alcohol Use 7/8/2021   Prescreen: >3 drinks/day or >7 drinks/week? No   Prescreen: >3 drinks/day or >7 drinks/week? -   No flowsheet data found.      Current providers sharing in care for this patient include:  Patient Care Team:  Tay Oshea MD as PCP - General (Family Practice)  Tay Oshea MD as Assigned PCP    The following health maintenance items are reviewed in Epic and correct as of today:  Health Maintenance Due   Topic Date Due     ANNUAL REVIEW OF HM ORDERS  Never done     ZOSTER IMMUNIZATION (1 of 2) Never done     DTAP/TDAP/TD IMMUNIZATION (2 - Td) 09/17/2018     AORTIC ANEURYSM SCREENING (SYSTEM ASSIGNED)  Never done     FALL RISK ASSESSMENT  07/06/2021     COLORECTAL CANCER SCREENING  07/17/2021     Lab work is in process  Labs reviewed in EPIC  BP Readings from Last 3 Encounters:   07/08/21 124/80   07/06/20 106/72   05/23/19 119/77    Wt Readings from Last 3 Encounters:   07/08/21 93.2 kg (205 lb 8 oz)   07/06/20 89.6 kg (197 lb 9.6 oz)   05/23/19 93.9 kg (207 lb)                  Patient Active Problem List   Diagnosis     Heterotopic tissue     CARDIOVASCULAR SCREENING; LDL GOAL LESS THAN 130     Hypertension goal BP (blood pressure) < 140/90     Advanced directives, counseling/discussion     Impingement syndrome of shoulder region     Erectile dysfunction due to diseases classified elsewhere     History reviewed. No pertinent surgical history.    Social History     Tobacco Use     Smoking status:  "Current Some Day Smoker     Types: Cigars     Smokeless tobacco: Never Used     Tobacco comment: occ   Substance Use Topics     Alcohol use: Yes     Comment: a few times a week     Family History   Problem Relation Age of Onset     Heart Disease Father      Breast Cancer Mother          Current Outpatient Medications   Medication Sig Dispense Refill     aspirin 325 MG EC tablet Take 325 mg by mouth daily.       atorvastatin (LIPITOR) 10 MG tablet TAKE 1 TABLET BY MOUTH EVERY DAY 90 tablet 0     COMPLETE MULTI-PURPOSE 1 daily       Docusate Calcium (STOOL SOFTENER PO) Take  by mouth.       FIBER PO Take  by mouth.       lisinopril-hydrochlorothiazide (ZESTORETIC) 20-25 MG tablet Take 1 tablet by mouth daily 90 tablet 3     Omega-3 Fatty Acids (FISH OIL PO) Take  by mouth.       VITAMIN D 42239 UNIT OR CAPS every week on friday       Allergies   Allergen Reactions     No Known Drug Allergies      Immunization History   Administered Date(s) Administered     COVID-19,PF,Pfizer 03/22/2021, 04/12/2021     Pneumococcal 23 valent 07/06/2020     TDAP Vaccine (Adacel) 09/17/2008     Td (Adult), Adsorbed 07/15/1998       Any new diagnosis of family breast, ovarian, or bowel cancer? No    FHS-7: No flowsheet data found.      Pertinent mammograms are reviewed under the imaging tab.    Review of Systems  Constitutional, HEENT, cardiovascular, pulmonary, GI, , musculoskeletal, neuro, skin, endocrine and psych systems are negative, except as otherwise noted.    OBJECTIVE:   /80 (Patient Position: Sitting, Cuff Size: Adult Regular)   Pulse 91   Temp 97  F (36.1  C) (Tympanic)   Resp 20   Ht 1.721 m (5' 7.75\")   Wt 93.2 kg (205 lb 8 oz)   SpO2 97%   BMI 31.48 kg/m   Estimated body mass index is 31.48 kg/m  as calculated from the following:    Height as of this encounter: 1.721 m (5' 7.75\").    Weight as of this encounter: 93.2 kg (205 lb 8 oz).  Physical Exam    General Appearance: Pleasant, alert, in no acute " "respiratory distress.  Head Exam: Normal. Normocephalic, atraumatic. No sinus tenderness.  Eye Exam: Normal external eye, conjunctiva, lids. ARLEY.  Ear Exam: Normal auditory canals and external ears. Non-tender.  Left TM-normal. Right TM-normal.  Neck Exam: Supple, no obvious thyroid enlargement  Chest/Respiratory Exam: Normal, comfortable, easy respirations. Chest wall normal. Lungs are clear to auscultation. No wheezing, crackles, or rhonchi.  Cardiovascular Exam: Regular rate and rhythm. No murmur, gallop, or rubs.  Musculoskeletal Exam: Back is non-tender, full ROM of upper and lower extremities.  Skin: no rash, warm and dry.  Neurologic Exam: Nonfocal, no tremor. Normal gait.  Psychiatric Exam: Alert - appropriate, normal affect      ASSESSMENT / PLAN:       ICD-10-CM    1. Encounter for Medicare annual wellness exam  Z00.00 REVIEW OF HEALTH MAINTENANCE PROTOCOL ORDERS   2. Lipid screening  Z13.220 Lipid Profile (Chol, Trig, HDL, LDL calc)   3. Hypertension goal BP (blood pressure) < 140/90  I10 Basic metabolic panel   4. Need for vaccination  Z23 TDAP VACCINE (Adacel, Boostrix)  [[id number]]   5. Colon cancer screening  Z12.11 GASTROENTEROLOGY ADULT REF PROCEDURE ONLY     Patient has stable chronic conditions as noted    These diagnoses were each reviewed for stability and medications were reviewed and refilled, labs ordered and updated.    Patient has been advised of split billing requirements and indicates understanding: Yes  COUNSELING:  Reviewed preventive health counseling, as reflected in patient instructions       Regular exercise       Healthy diet/nutrition    Estimated body mass index is 31.48 kg/m  as calculated from the following:    Height as of this encounter: 1.721 m (5' 7.75\").    Weight as of this encounter: 93.2 kg (205 lb 8 oz).        He reports that he has been smoking cigars. He has never used smokeless tobacco.  Tobacco Cessation Action Plan:   Information offered: Patient not interested at " this time      Appropriate preventive services were discussed with this patient, including applicable screening as appropriate for cardiovascular disease, diabetes, osteopenia/osteoporosis, and glaucoma.  As appropriate for age/gender, discussed screening for colorectal cancer, prostate cancer, breast cancer, and cervical cancer. Checklist reviewing preventive services available has been given to the patient.    Reviewed patients plan of care and provided an AVS. The Basic Care Plan (routine screening as documented in Health Maintenance) for Edd meets the Care Plan requirement. This Care Plan has been established and reviewed with the Patient.    Counseling Resources:  ATP IV Guidelines  Pooled Cohorts Equation Calculator  Breast Cancer Risk Calculator  Breast Cancer: Medication to Reduce Risk  FRAX Risk Assessment  ICSI Preventive Guidelines  Dietary Guidelines for Americans, 2010  Branded Online's MyPlate  ASA Prophylaxis  Lung CA Screening    Tay Oshea MD  [facility]    Identified Health Risks:

## 2021-07-08 ENCOUNTER — OFFICE VISIT (OUTPATIENT)
Dept: FAMILY MEDICINE | Facility: CLINIC | Age: 66
End: 2021-07-08
Payer: COMMERCIAL

## 2021-07-08 VITALS
RESPIRATION RATE: 20 BRPM | BODY MASS INDEX: 31.14 KG/M2 | TEMPERATURE: 97 F | HEART RATE: 91 BPM | WEIGHT: 205.5 LBS | DIASTOLIC BLOOD PRESSURE: 80 MMHG | OXYGEN SATURATION: 97 % | SYSTOLIC BLOOD PRESSURE: 124 MMHG | HEIGHT: 68 IN

## 2021-07-08 DIAGNOSIS — Z12.11 COLON CANCER SCREENING: ICD-10-CM

## 2021-07-08 DIAGNOSIS — I10 HYPERTENSION GOAL BP (BLOOD PRESSURE) < 140/90: ICD-10-CM

## 2021-07-08 DIAGNOSIS — Z23 NEED FOR VACCINATION: ICD-10-CM

## 2021-07-08 DIAGNOSIS — Z00.00 ENCOUNTER FOR MEDICARE ANNUAL WELLNESS EXAM: Primary | ICD-10-CM

## 2021-07-08 DIAGNOSIS — Z13.220 LIPID SCREENING: ICD-10-CM

## 2021-07-08 DIAGNOSIS — E78.5 HYPERLIPIDEMIA WITH TARGET LDL LESS THAN 100: ICD-10-CM

## 2021-07-08 LAB
ANION GAP SERPL CALCULATED.3IONS-SCNC: 3 MMOL/L (ref 3–14)
BUN SERPL-MCNC: 17 MG/DL (ref 7–30)
CALCIUM SERPL-MCNC: 9.4 MG/DL (ref 8.5–10.1)
CHLORIDE SERPL-SCNC: 104 MMOL/L (ref 94–109)
CHOLEST SERPL-MCNC: 169 MG/DL
CO2 SERPL-SCNC: 31 MMOL/L (ref 20–32)
CREAT SERPL-MCNC: 0.99 MG/DL (ref 0.66–1.25)
GFR SERPL CREATININE-BSD FRML MDRD: 79 ML/MIN/{1.73_M2}
GLUCOSE SERPL-MCNC: 98 MG/DL (ref 70–99)
HDLC SERPL-MCNC: 52 MG/DL
LDLC SERPL CALC-MCNC: 97 MG/DL
NONHDLC SERPL-MCNC: 117 MG/DL
POTASSIUM SERPL-SCNC: 4 MMOL/L (ref 3.4–5.3)
SODIUM SERPL-SCNC: 138 MMOL/L (ref 133–144)
TRIGL SERPL-MCNC: 102 MG/DL

## 2021-07-08 PROCEDURE — 80048 BASIC METABOLIC PNL TOTAL CA: CPT | Performed by: FAMILY MEDICINE

## 2021-07-08 PROCEDURE — 99397 PER PM REEVAL EST PAT 65+ YR: CPT | Mod: 25 | Performed by: FAMILY MEDICINE

## 2021-07-08 PROCEDURE — 80061 LIPID PANEL: CPT | Performed by: FAMILY MEDICINE

## 2021-07-08 PROCEDURE — 90715 TDAP VACCINE 7 YRS/> IM: CPT | Performed by: FAMILY MEDICINE

## 2021-07-08 PROCEDURE — 90471 IMMUNIZATION ADMIN: CPT | Performed by: FAMILY MEDICINE

## 2021-07-08 PROCEDURE — 36415 COLL VENOUS BLD VENIPUNCTURE: CPT | Performed by: FAMILY MEDICINE

## 2021-07-08 RX ORDER — LISINOPRIL AND HYDROCHLOROTHIAZIDE 20; 25 MG/1; MG/1
1 TABLET ORAL DAILY
Qty: 90 TABLET | Refills: 3 | Status: SHIPPED | OUTPATIENT
Start: 2021-07-08 | End: 2022-06-23

## 2021-07-08 RX ORDER — ASPIRIN 325 MG
325 TABLET, DELAYED RELEASE (ENTERIC COATED) ORAL DAILY
Qty: 90 TABLET | Refills: 3 | Status: SHIPPED | OUTPATIENT
Start: 2021-07-08 | End: 2022-07-27

## 2021-07-08 RX ORDER — ATORVASTATIN CALCIUM 10 MG/1
10 TABLET, FILM COATED ORAL DAILY
Qty: 90 TABLET | Refills: 3 | Status: SHIPPED | OUTPATIENT
Start: 2021-07-08 | End: 2022-06-23

## 2021-07-08 ASSESSMENT — MIFFLIN-ST. JEOR: SCORE: 1677.67

## 2021-07-08 ASSESSMENT — ACTIVITIES OF DAILY LIVING (ADL): CURRENT_FUNCTION: NO ASSISTANCE NEEDED

## 2021-07-08 NOTE — LETTER
July 8, 2021      Edd Bazan  [address] [facility] [address]    Dear ,    We are writing to inform you of your test results.  They look great!  No changes    Resulted Orders   Lipid Profile (Chol, Trig, HDL, LDL calc)   Result Value Ref Range    Cholesterol 169 <200 mg/dL    Triglycerides 102 <150 mg/dL      Comment:      Non Fasting    HDL Cholesterol 52 >39 mg/dL    LDL Cholesterol Calculated 97 <100 mg/dL      Comment:      Desirable:       <100 mg/dl    Non HDL Cholesterol 117 <130 mg/dL   Basic metabolic panel   Result Value Ref Range    Sodium 138 133 - 144 mmol/L    Potassium 4.0 3.4 - 5.3 mmol/L    Chloride 104 94 - 109 mmol/L    Carbon Dioxide 31 20 - 32 mmol/L    Anion Gap 3 3 - 14 mmol/L    Glucose 98 70 - 99 mg/dL      Comment:      Non Fasting    Urea Nitrogen 17 7 - 30 mg/dL    Creatinine 0.99 0.66 - 1.25 mg/dL    GFR Estimate 79 >60 mL/min/[1.73_m2]    Calcium 9.4 8.5 - 10.1 mg/dL       If you have any questions or concerns, please call the clinic at the number listed above.     Sincerely,      Tay Oshea MD

## 2021-07-08 NOTE — NURSING NOTE
Prior to immunization administration, verified patients identity using patient s name and date of birth. Please see Immunization Activity for additional information.     Screening Questionnaire for Adult Immunization    Are you sick today?   No   Do you have allergies to medications, food, a vaccine component or latex?   No   Have you ever had a serious reaction after receiving a vaccination?   No   Do you have a long-term health problem with heart, lung, kidney, or metabolic disease (e.g., diabetes), asthma, a blood disorder, no spleen, complement component deficiency, a cochlear implant, or a spinal fluid leak?  Are you on long-term aspirin therapy?   No   Do you have cancer, leukemia, HIV/AIDS, or any other immune system problem?   No   Do you have a parent, brother, or sister with an immune system problem?   No   In the past 3 months, have you taken medications that affect  your immune system, such as prednisone, other steroids, or anticancer drugs; drugs for the treatment of rheumatoid arthritis, Crohn s disease, or psoriasis; or have you had radiation treatments?   No   Have you had a seizure, or a brain or other nervous system problem?   No   During the past year, have you received a transfusion of blood or blood    products, or been given immune (gamma) globulin or antiviral drug?   No   For women: Are you pregnant or is there a chance you could become       pregnant during the next month?   No   Have you received any vaccinations in the past 4 weeks?   No     Immunization questionnaire answers were all negative.        Per orders of Dr. Oshea, injection of Adacel given by Madhavi Hammond MA. Patient instructed to remain in clinic for 15 minutes afterwards, and to report any adverse reaction to me immediately.       Screening performed by Madhavi Hammond MA on 7/8/2021 at 10:42 AM.  Madhavi Hammond MA on 7/8/2021 at 10:43 AM

## 2021-07-26 ENCOUNTER — TELEPHONE (OUTPATIENT)
Dept: GASTROENTEROLOGY | Facility: CLINIC | Age: 66
End: 2021-07-26

## 2021-07-26 DIAGNOSIS — Z11.59 ENCOUNTER FOR SCREENING FOR OTHER VIRAL DISEASES: ICD-10-CM

## 2021-07-26 NOTE — TELEPHONE ENCOUNTER
Screening Questions  1. Are you active on mychart?    2. What insurance is in the chart? med[employer]    2.  Ordering/Referring Provider: Tay Oshea MDb    3. BMI 30.3    4. Are you on daily home oxygen? no    5. Do you have a history of difficult airway? no    6. Have you had a heart, lung, or liver transplant? No     7. Are you currently on dialysis? no    8. Have you had a stroke or Transient ischemic atttack (TIA) within 6 months?  no    9. In the past 6 months, have you had any heart related issues including cardiomyopathy or heart attack?         If yes, did it require cardiac stenting or other implantable device? no    10. Do you have any implantable devices in your body (pacemaker, defib, LVAD)?  no    11. Do you take nitroglycerin? If yes, how often? no    12. Are you currently taking any blood thinners? no    13. Are you a diabetic?  no    14. (Females) Are you currently pregnant?   If yes, how many weeks?    15. Have you had a procedure in the past that was difficult to tolerate with conscious sedation? Any allergies to Fentanyl or Versed  no    16. Are you taking any scheduled prescription narcotics more than once daily?  no    17. Do you have any chemical dependencies such as alcohol, street drugs, or methadone? no    18. Do you have any history of post-traumatic stress syndrome or mental health issues? No     19. Do you transfer independently? yes    20.  Do you have any issues with constipation? no    21. Preferred Pharmacy for Pre Prescription [facility] on chart    Scheduling Details    Colonoscopy Prep Sent?: miralax  Procedure Scheduled: colon  Provider/Surgeon: Kandace  Date of Procedure: 8/9/2021  Location:   Caller (Please ask for phone number if not scheduled by patient): Edd Bazan      Sedation Type: CS  Conscious Sedation- Needs  for 6 hours after the procedure  MAC/General-Needs  for 24 hours after procedure    [facility]op Required at [facility], [facility] and OR  for MAC sedation:   (if yes advise patient they will need a pre-op prior to procedure)      Is patient on blood thinners? -no (If yes- inform patient to follow up with PCP or provider for follow up instructions)     Informed patient they will need an adult  yes  Cannot take any type of public or medical transportation alone    Informed Patient of COVID Test Requirement yes    Confirmed Nurse will call to complete assessment yes    Additional comments: no

## 2021-08-05 ENCOUNTER — LAB (OUTPATIENT)
Dept: LAB | Facility: CLINIC | Age: 66
End: 2021-08-05

## 2021-08-05 DIAGNOSIS — Z11.59 ENCOUNTER FOR SCREENING FOR OTHER VIRAL DISEASES: ICD-10-CM

## 2021-08-05 LAB — SARS-COV-2 RNA RESP QL NAA+PROBE: NEGATIVE

## 2021-08-05 PROCEDURE — U0005 INFEC AGEN DETEC AMPLI PROBE: HCPCS

## 2021-08-05 PROCEDURE — U0003 INFECTIOUS AGENT DETECTION BY NUCLEIC ACID (DNA OR RNA); SEVERE ACUTE RESPIRATORY SYNDROME CORONAVIRUS 2 (SARS-COV-2) (CORONAVIRUS DISEASE [COVID-19]), AMPLIFIED PROBE TECHNIQUE, MAKING USE OF HIGH THROUGHPUT TECHNOLOGIES AS DESCRIBED BY CMS-2020-01-R: HCPCS

## 2021-08-09 ENCOUNTER — HOSPITAL ENCOUNTER (OUTPATIENT)
Facility: CLINIC | Age: 66
Discharge: HOME | End: 2021-08-09
Attending: INTERNAL MEDICINE | Admitting: INTERNAL MEDICINE
Payer: COMMERCIAL

## 2021-08-09 VITALS
BODY MASS INDEX: 31.4 KG/M2 | OXYGEN SATURATION: 99 % | HEART RATE: 80 BPM | SYSTOLIC BLOOD PRESSURE: 107 MMHG | WEIGHT: 205 LBS | RESPIRATION RATE: 16 BRPM | DIASTOLIC BLOOD PRESSURE: 78 MMHG

## 2021-08-09 LAB — COLONOSCOPY: NORMAL

## 2021-08-09 PROCEDURE — G0121 COLON CA SCRN NOT HI RSK IND: HCPCS | Performed by: INTERNAL MEDICINE

## 2021-08-09 PROCEDURE — G0500 MOD SEDAT ENDO SERVICE >5YRS: HCPCS | Performed by: INTERNAL MEDICINE

## 2021-08-09 PROCEDURE — 250N000011 HC RX IP 250 OP 636: Performed by: INTERNAL MEDICINE

## 2021-08-09 PROCEDURE — 45378 DIAGNOSTIC COLONOSCOPY: CPT | Performed by: INTERNAL MEDICINE

## 2021-08-09 RX ORDER — FENTANYL CITRATE 50 UG/ML
INJECTION, SOLUTION INTRAMUSCULAR; INTRAVENOUS PRN
Status: COMPLETED | OUTPATIENT
Start: 2021-08-09 | End: 2021-08-09

## 2021-08-09 RX ADMIN — MIDAZOLAM 2 MG: 1 INJECTION INTRAMUSCULAR; INTRAVENOUS at 15:23

## 2021-08-09 RX ADMIN — FENTANYL CITRATE 100 MCG: 50 INJECTION, SOLUTION INTRAMUSCULAR; INTRAVENOUS at 15:22

## 2021-08-09 RX ADMIN — MIDAZOLAM 1 MG: 1 INJECTION INTRAMUSCULAR; INTRAVENOUS at 15:31

## 2021-08-09 RX ADMIN — MIDAZOLAM 1 MG: 1 INJECTION INTRAMUSCULAR; INTRAVENOUS at 15:28

## 2022-06-23 ENCOUNTER — OFFICE VISIT (OUTPATIENT)
Dept: FAMILY MEDICINE | Facility: CLINIC | Age: 67
End: 2022-06-23
Payer: COMMERCIAL

## 2022-06-23 VITALS
RESPIRATION RATE: 24 BRPM | TEMPERATURE: 97.4 F | HEIGHT: 67 IN | DIASTOLIC BLOOD PRESSURE: 62 MMHG | HEART RATE: 100 BPM | OXYGEN SATURATION: 99 % | BODY MASS INDEX: 32.49 KG/M2 | WEIGHT: 207 LBS | SYSTOLIC BLOOD PRESSURE: 134 MMHG

## 2022-06-23 DIAGNOSIS — I10 HYPERTENSION GOAL BP (BLOOD PRESSURE) < 140/90: ICD-10-CM

## 2022-06-23 DIAGNOSIS — Z87.820 HX OF TRAUMATIC BRAIN INJURY: ICD-10-CM

## 2022-06-23 DIAGNOSIS — S49.91XA SHOULDER INJURY, RIGHT, INITIAL ENCOUNTER: ICD-10-CM

## 2022-06-23 DIAGNOSIS — Z00.00 ENCOUNTER FOR MEDICARE ANNUAL WELLNESS EXAM: Primary | ICD-10-CM

## 2022-06-23 DIAGNOSIS — Z23 NEED FOR VACCINATION: ICD-10-CM

## 2022-06-23 DIAGNOSIS — E78.5 HYPERLIPIDEMIA WITH TARGET LDL LESS THAN 100: ICD-10-CM

## 2022-06-23 PROCEDURE — 99213 OFFICE O/P EST LOW 20 MIN: CPT | Mod: 25 | Performed by: FAMILY MEDICINE

## 2022-06-23 PROCEDURE — 91305 COVID-19,PF,PFIZER (12+ YRS): CPT | Performed by: FAMILY MEDICINE

## 2022-06-23 PROCEDURE — 90670 PCV13 VACCINE IM: CPT | Performed by: FAMILY MEDICINE

## 2022-06-23 PROCEDURE — 90471 IMMUNIZATION ADMIN: CPT | Performed by: FAMILY MEDICINE

## 2022-06-23 PROCEDURE — 0054A COVID-19,PF,PFIZER (12+ YRS): CPT | Performed by: FAMILY MEDICINE

## 2022-06-23 PROCEDURE — 99397 PER PM REEVAL EST PAT 65+ YR: CPT | Mod: 25 | Performed by: FAMILY MEDICINE

## 2022-06-23 RX ORDER — LISINOPRIL AND HYDROCHLOROTHIAZIDE 20; 25 MG/1; MG/1
1 TABLET ORAL DAILY
Qty: 90 TABLET | Refills: 3 | Status: SHIPPED | OUTPATIENT
Start: 2022-06-23 | End: 2023-06-26

## 2022-06-23 RX ORDER — ATORVASTATIN CALCIUM 10 MG/1
10 TABLET, FILM COATED ORAL DAILY
Qty: 90 TABLET | Refills: 3 | Status: SHIPPED | OUTPATIENT
Start: 2022-06-23 | End: 2023-06-26

## 2022-06-23 ASSESSMENT — ENCOUNTER SYMPTOMS
DIZZINESS: 0
WEAKNESS: 0
NAUSEA: 0
MYALGIAS: 0
DYSURIA: 0
HEARTBURN: 0
PALPITATIONS: 0
DIARRHEA: 0
FEVER: 0
SORE THROAT: 0
EYE PAIN: 0
HEMATURIA: 0
HEMATOCHEZIA: 0
FREQUENCY: 0
HEADACHES: 0
ABDOMINAL PAIN: 0
SHORTNESS OF BREATH: 0
COUGH: 0
CHILLS: 0
CONSTIPATION: 0
NERVOUS/ANXIOUS: 0
ARTHRALGIAS: 0
PARESTHESIAS: 0
JOINT SWELLING: 0

## 2022-06-23 ASSESSMENT — PAIN SCALES - GENERAL: PAINLEVEL: MILD PAIN (2)

## 2022-06-23 ASSESSMENT — ACTIVITIES OF DAILY LIVING (ADL): CURRENT_FUNCTION: NO ASSISTANCE NEEDED

## 2022-06-23 NOTE — PATIENT INSTRUCTIONS
Patient Education   Personalized Prevention Plan  You are due for the preventive services outlined below.  Your care team is available to assist you in scheduling these services.  If you have already completed any of these items, please share that information with your care team to update in your medical record.  Health Maintenance Due   Topic Date Due     Zoster (Shingles) Vaccine (1 of 2) Never done     LUNG CANCER SCREENING  Never done     AORTIC ANEURYSM SCREENING (SYSTEM ASSIGNED)  Never done     Pneumococcal Vaccine (2 - PCV) 07/06/2021     COVID-19 Vaccine (4 - Booster for Pfizer series) 04/14/2022     ANNUAL REVIEW OF HM ORDERS  07/08/2022
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
## 2023-01-20 DIAGNOSIS — I10 HYPERTENSION GOAL BP (BLOOD PRESSURE) < 140/90: ICD-10-CM

## 2023-01-20 RX ORDER — ASPIRIN 325 MG
TABLET, DELAYED RELEASE (ENTERIC COATED) ORAL
Qty: 90 TABLET | Refills: 1 | Status: SHIPPED | OUTPATIENT
Start: 2023-01-20 | End: 2023-07-20

## 2023-06-26 ENCOUNTER — OFFICE VISIT (OUTPATIENT)
Dept: FAMILY MEDICINE | Facility: CLINIC | Age: 68
End: 2023-06-26
Payer: COMMERCIAL

## 2023-06-26 VITALS
OXYGEN SATURATION: 99 % | RESPIRATION RATE: 20 BRPM | DIASTOLIC BLOOD PRESSURE: 80 MMHG | HEART RATE: 95 BPM | HEIGHT: 68 IN | BODY MASS INDEX: 30.83 KG/M2 | WEIGHT: 203.4 LBS | SYSTOLIC BLOOD PRESSURE: 126 MMHG | TEMPERATURE: 98.1 F

## 2023-06-26 DIAGNOSIS — Z87.820 HX OF TRAUMATIC BRAIN INJURY: ICD-10-CM

## 2023-06-26 DIAGNOSIS — E78.5 HYPERLIPIDEMIA WITH TARGET LDL LESS THAN 100: ICD-10-CM

## 2023-06-26 DIAGNOSIS — Z23 NEED FOR SHINGLES VACCINE: ICD-10-CM

## 2023-06-26 DIAGNOSIS — Z72.0 TOBACCO ABUSE: ICD-10-CM

## 2023-06-26 DIAGNOSIS — Z00.00 ENCOUNTER FOR MEDICARE ANNUAL WELLNESS EXAM: Primary | ICD-10-CM

## 2023-06-26 DIAGNOSIS — I10 HYPERTENSION GOAL BP (BLOOD PRESSURE) < 140/90: ICD-10-CM

## 2023-06-26 DIAGNOSIS — S62.652S CLOSED NONDISPLACED FRACTURE OF MIDDLE PHALANX OF RIGHT MIDDLE FINGER, SEQUELA: ICD-10-CM

## 2023-06-26 LAB
ANION GAP SERPL CALCULATED.3IONS-SCNC: 11 MMOL/L (ref 7–15)
BUN SERPL-MCNC: 16.8 MG/DL (ref 8–23)
CALCIUM SERPL-MCNC: 9.5 MG/DL (ref 8.8–10.2)
CHLORIDE SERPL-SCNC: 97 MMOL/L (ref 98–107)
CHOLEST SERPL-MCNC: 174 MG/DL
CREAT SERPL-MCNC: 0.75 MG/DL (ref 0.67–1.17)
DEPRECATED HCO3 PLAS-SCNC: 27 MMOL/L (ref 22–29)
GFR SERPL CREATININE-BSD FRML MDRD: >90 ML/MIN/1.73M2
GLUCOSE SERPL-MCNC: 105 MG/DL (ref 70–99)
HDLC SERPL-MCNC: 57 MG/DL
LDLC SERPL CALC-MCNC: 98 MG/DL
NONHDLC SERPL-MCNC: 117 MG/DL
POTASSIUM SERPL-SCNC: 4 MMOL/L (ref 3.4–5.3)
SODIUM SERPL-SCNC: 135 MMOL/L (ref 136–145)
TRIGL SERPL-MCNC: 96 MG/DL

## 2023-06-26 PROCEDURE — 99214 OFFICE O/P EST MOD 30 MIN: CPT | Mod: 25 | Performed by: FAMILY MEDICINE

## 2023-06-26 PROCEDURE — 80061 LIPID PANEL: CPT | Performed by: FAMILY MEDICINE

## 2023-06-26 PROCEDURE — 36415 COLL VENOUS BLD VENIPUNCTURE: CPT | Performed by: FAMILY MEDICINE

## 2023-06-26 PROCEDURE — 99397 PER PM REEVAL EST PAT 65+ YR: CPT | Mod: 25 | Performed by: FAMILY MEDICINE

## 2023-06-26 PROCEDURE — 91312 COVID-19 BIVALENT 12+ (PFIZER): CPT | Performed by: FAMILY MEDICINE

## 2023-06-26 PROCEDURE — 0121A COVID-19 BIVALENT 12+ (PFIZER): CPT | Performed by: FAMILY MEDICINE

## 2023-06-26 PROCEDURE — 80048 BASIC METABOLIC PNL TOTAL CA: CPT | Performed by: FAMILY MEDICINE

## 2023-06-26 RX ORDER — ATORVASTATIN CALCIUM 10 MG/1
10 TABLET, FILM COATED ORAL DAILY
Qty: 90 TABLET | Refills: 3 | Status: SHIPPED | OUTPATIENT
Start: 2023-06-26 | End: 2024-05-24

## 2023-06-26 RX ORDER — LISINOPRIL AND HYDROCHLOROTHIAZIDE 20; 25 MG/1; MG/1
1 TABLET ORAL DAILY
Qty: 90 TABLET | Refills: 3 | Status: SHIPPED | OUTPATIENT
Start: 2023-06-26 | End: 2024-05-24

## 2023-06-26 ASSESSMENT — ENCOUNTER SYMPTOMS
COUGH: 0
HEMATURIA: 0
NERVOUS/ANXIOUS: 0
NAUSEA: 0
CHILLS: 0
SORE THROAT: 0
PARESTHESIAS: 0
SHORTNESS OF BREATH: 0
WEAKNESS: 0
MYALGIAS: 0
ABDOMINAL PAIN: 0
PALPITATIONS: 0
HEARTBURN: 0
FREQUENCY: 0
HEMATOCHEZIA: 0
JOINT SWELLING: 0
ARTHRALGIAS: 0
DYSURIA: 0
FEVER: 0
EYE PAIN: 0
DIARRHEA: 0
DIZZINESS: 0
CONSTIPATION: 0
HEADACHES: 0

## 2023-06-26 ASSESSMENT — ACTIVITIES OF DAILY LIVING (ADL): CURRENT_FUNCTION: NO ASSISTANCE NEEDED

## 2023-06-26 ASSESSMENT — PAIN SCALES - GENERAL: PAINLEVEL: NO PAIN (0)
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
## 2023-06-26 NOTE — PATIENT INSTRUCTIONS
Patient Education   Personalized Prevention Plan  You are due for the preventive services outlined below.  Your care team is available to assist you in scheduling these services.  If you have already completed any of these items, please share that information with your care team to update in your medical record.  Health Maintenance Due   Topic Date Due     Zoster (Shingles) Vaccine (1 of 2) Never done     LUNG CANCER SCREENING  Never done     COVID-19 Vaccine (5 - Pfizer series) 08/18/2022     ANNUAL REVIEW OF HM ORDERS  06/23/2023     Annual Wellness Visit  06/23/2023     Preventive Health Recommendations  See your health care provider every year to    Review health changes.     Discuss preventive care.      Review your medicines if your doctor has prescribed any.    Talk with your health care provider about whether you should have a test to screen for prostate cancer (PSA).    Every 3 years, have a diabetes test (fasting glucose). If you are at risk for diabetes, you should have this test more often.    Every 5 years, have a cholesterol test. Have this test more often if you are at risk for high cholesterol or heart disease.     Every 10 years, have a colonoscopy. Or, have a yearly FIT test (stool test). These exams will check for colon cancer.    Talk to with your health care provider about screening for Abdominal Aortic Aneurysm if you have a family history of AAA or have a history of smoking.    Shots:     Get a flu shot each year.     Get a tetanus shot every 10 years.     Talk to your doctor about your pneumonia vaccines. There are now two you should receive - Pneumovax (PPSV 23) and Prevnar (PCV 13).    Talk to your pharmacist about a shingles vaccine.     Talk to your doctor about the hepatitis B vaccine.    Nutrition:     Eat at least 5 servings of fruits and vegetables each day.     Eat whole-grain bread, whole-wheat pasta and brown rice instead of white grains and rice.     Get adequate Calcium and  Vitamin D.     Lifestyle    Exercise for at least 150 minutes a week (30 minutes a day, 5 days a week). This will help you control your weight and prevent disease.     Limit alcohol to one drink per day.     No smoking.     Wear sunscreen to prevent skin cancer.     See your dentist every six months for an exam and cleaning.     See your eye doctor every 1 to 2 years to screen for conditions such as glaucoma, macular degeneration and cataracts.    Personalized Prevention Plan  You are due for the preventive services outlined below.  Your care team is available to assist you in scheduling these services.  If you have already completed any of these items, please share that information with your care team to update in your medical record.  Health Maintenance   Topic Date Due     ZOSTER IMMUNIZATION (1 of 2) Never done     LUNG CANCER SCREENING  Never done     COVID-19 Vaccine (5 - Pfizer series) 08/18/2022     ANNUAL REVIEW OF HM ORDERS  06/23/2023     MEDICARE ANNUAL WELLNESS VISIT  06/23/2023     INFLUENZA VACCINE (Season Ended) 09/01/2023     FALL RISK ASSESSMENT  06/26/2024     COLORECTAL CANCER SCREENING  08/09/2024     LIPID  07/08/2026     ADVANCE CARE PLANNING  06/23/2027     DTAP/TDAP/TD IMMUNIZATION (3 - Td or Tdap) 07/08/2031     HEPATITIS C SCREENING  Completed     PHQ-2 (once per calendar year)  Completed     Pneumococcal Vaccine: 65+ Years  Completed     IPV IMMUNIZATION  Aged Out     MENINGITIS IMMUNIZATION  Aged Out     AORTIC ANEURYSM SCREENING (SYSTEM ASSIGNED)  Discontinued

## 2023-06-26 NOTE — LETTER
June 27, 2023      Edd Bazan  [address]   [facility] [place] [address]        Dear ,    We are writing to inform you of your test results.    The results of your recent lab tests were within normal limits. Enclosed is a copy of these results.  If you have any further questions or problems, please contact our office.      Resulted Orders   Basic metabolic panel  (Ca, Cl, CO2, Creat, Gluc, K, Na, BUN)   Result Value Ref Range    Sodium 135 (L) 136 - 145 mmol/L    Potassium 4.0 3.4 - 5.3 mmol/L    Chloride 97 (L) 98 - 107 mmol/L    Carbon Dioxide (CO2) 27 22 - 29 mmol/L    Anion Gap 11 7 - 15 mmol/L    Urea Nitrogen 16.8 8.0 - 23.0 mg/dL    Creatinine 0.75 0.67 - 1.17 mg/dL    Calcium 9.5 8.8 - 10.2 mg/dL    Glucose 105 (H) 70 - 99 mg/dL    GFR Estimate >90 >60 mL/min/1.73m2   Lipid panel reflex to direct LDL Non-fasting   Result Value Ref Range    Cholesterol 174 <200 mg/dL    Triglycerides 96 <150 mg/dL    Direct Measure HDL 57 >=40 mg/dL    LDL Cholesterol Calculated 98 <=100 mg/dL    Non HDL Cholesterol 117 <130 mg/dL    Narrative    Cholesterol  Desirable:  <200 mg/dL    Triglycerides  Normal:  Less than 150 mg/dL  Borderline High:  150-199 mg/dL  High:  200-499 mg/dL  Very High:  Greater than or equal to 500 mg/dL    Direct Measure HDL  Female:  Greater than or equal to 50 mg/dL   Male:  Greater than or equal to 40 mg/dL    LDL Cholesterol  Desirable:  <100mg/dL  Above Desirable:  100-129 mg/dL   Borderline High:  130-159 mg/dL   High:  160-189 mg/dL   Very High:  >= 190 mg/dL    Non HDL Cholesterol  Desirable:  130 mg/dL  Above Desirable:  130-159 mg/dL  Borderline High:  160-189 mg/dL  High:  190-219 mg/dL  Very High:  Greater than or equal to 220 mg/dL       If you have any questions or concerns, please call the clinic at the number listed above.       Sincerely,      Aruna Gutierrez DO/fani            
36.7

## 2023-07-20 DIAGNOSIS — I10 HYPERTENSION GOAL BP (BLOOD PRESSURE) < 140/90: ICD-10-CM

## 2023-07-20 RX ORDER — ASPIRIN 325 MG
TABLET, DELAYED RELEASE (ENTERIC COATED) ORAL
Qty: 90 TABLET | Refills: 1 | Status: SHIPPED | OUTPATIENT
Start: 2023-07-20 | End: 2024-01-15

## 2024-01-14 DIAGNOSIS — I10 HYPERTENSION GOAL BP (BLOOD PRESSURE) < 140/90: ICD-10-CM

## 2024-01-15 RX ORDER — ASPIRIN 325 MG
TABLET, DELAYED RELEASE (ENTERIC COATED) ORAL
Qty: 90 TABLET | Refills: 1 | Status: SHIPPED | OUTPATIENT
Start: 2024-01-15 | End: 2024-07-01

## 2024-05-23 DIAGNOSIS — E78.5 HYPERLIPIDEMIA WITH TARGET LDL LESS THAN 100: ICD-10-CM

## 2024-05-23 DIAGNOSIS — I10 HYPERTENSION GOAL BP (BLOOD PRESSURE) < 140/90: ICD-10-CM

## 2024-05-24 RX ORDER — ATORVASTATIN CALCIUM 10 MG/1
10 TABLET, FILM COATED ORAL DAILY
Qty: 90 TABLET | Refills: 0 | Status: SHIPPED | OUTPATIENT
Start: 2024-05-24 | End: 2024-07-01

## 2024-05-24 RX ORDER — LISINOPRIL AND HYDROCHLOROTHIAZIDE 20; 25 MG/1; MG/1
1 TABLET ORAL DAILY
Qty: 90 TABLET | Refills: 0 | Status: SHIPPED | OUTPATIENT
Start: 2024-05-24 | End: 2024-07-01

## 2024-07-01 ENCOUNTER — OFFICE VISIT (OUTPATIENT)
Dept: FAMILY MEDICINE | Facility: CLINIC | Age: 69
End: 2024-07-01
Payer: COMMERCIAL

## 2024-07-01 ENCOUNTER — TELEPHONE (OUTPATIENT)
Dept: FAMILY MEDICINE | Facility: CLINIC | Age: 69
End: 2024-07-01

## 2024-07-01 VITALS
SYSTOLIC BLOOD PRESSURE: 105 MMHG | HEIGHT: 67 IN | WEIGHT: 187 LBS | TEMPERATURE: 98.5 F | RESPIRATION RATE: 23 BRPM | BODY MASS INDEX: 29.35 KG/M2 | HEART RATE: 88 BPM | DIASTOLIC BLOOD PRESSURE: 67 MMHG | OXYGEN SATURATION: 100 %

## 2024-07-01 DIAGNOSIS — Z12.5 SCREENING FOR PROSTATE CANCER: ICD-10-CM

## 2024-07-01 DIAGNOSIS — Z12.11 COLON CANCER SCREENING: ICD-10-CM

## 2024-07-01 DIAGNOSIS — R97.20 ELEVATED PROSTATE SPECIFIC ANTIGEN (PSA): ICD-10-CM

## 2024-07-01 DIAGNOSIS — E78.5 HYPERLIPIDEMIA WITH TARGET LDL LESS THAN 100: ICD-10-CM

## 2024-07-01 DIAGNOSIS — Z00.00 ENCOUNTER FOR ANNUAL PHYSICAL EXAM: Primary | ICD-10-CM

## 2024-07-01 DIAGNOSIS — I10 HYPERTENSION GOAL BP (BLOOD PRESSURE) < 140/90: ICD-10-CM

## 2024-07-01 LAB
ALBUMIN SERPL BCG-MCNC: 4.5 G/DL (ref 3.5–5.2)
ALP SERPL-CCNC: 74 U/L (ref 40–150)
ALT SERPL W P-5'-P-CCNC: 16 U/L (ref 0–70)
ANION GAP SERPL CALCULATED.3IONS-SCNC: 11 MMOL/L (ref 7–15)
AST SERPL W P-5'-P-CCNC: 17 U/L (ref 0–45)
BASOPHILS # BLD AUTO: 0 10E3/UL (ref 0–0.2)
BASOPHILS NFR BLD AUTO: 1 %
BILIRUB SERPL-MCNC: 0.5 MG/DL
BUN SERPL-MCNC: 18.6 MG/DL (ref 8–23)
CALCIUM SERPL-MCNC: 9.6 MG/DL (ref 8.8–10.2)
CHLORIDE SERPL-SCNC: 100 MMOL/L (ref 98–107)
CHOLEST SERPL-MCNC: 163 MG/DL
CREAT SERPL-MCNC: 0.83 MG/DL (ref 0.67–1.17)
DEPRECATED HCO3 PLAS-SCNC: 27 MMOL/L (ref 22–29)
EGFRCR SERPLBLD CKD-EPI 2021: >90 ML/MIN/1.73M2
EOSINOPHIL # BLD AUTO: 0.1 10E3/UL (ref 0–0.7)
EOSINOPHIL NFR BLD AUTO: 3 %
ERYTHROCYTE [DISTWIDTH] IN BLOOD BY AUTOMATED COUNT: 13.2 % (ref 10–15)
FASTING STATUS PATIENT QL REPORTED: YES
FASTING STATUS PATIENT QL REPORTED: YES
GLUCOSE SERPL-MCNC: 106 MG/DL (ref 70–99)
HCT VFR BLD AUTO: 40.3 % (ref 40–53)
HDLC SERPL-MCNC: 54 MG/DL
HGB BLD-MCNC: 13.6 G/DL (ref 13.3–17.7)
IMM GRANULOCYTES # BLD: 0 10E3/UL
IMM GRANULOCYTES NFR BLD: 0 %
LDLC SERPL CALC-MCNC: 99 MG/DL
LYMPHOCYTES # BLD AUTO: 1.1 10E3/UL (ref 0.8–5.3)
LYMPHOCYTES NFR BLD AUTO: 30 %
MCH RBC QN AUTO: 29.5 PG (ref 26.5–33)
MCHC RBC AUTO-ENTMCNC: 33.7 G/DL (ref 31.5–36.5)
MCV RBC AUTO: 87 FL (ref 78–100)
MONOCYTES # BLD AUTO: 0.5 10E3/UL (ref 0–1.3)
MONOCYTES NFR BLD AUTO: 14 %
NEUTROPHILS # BLD AUTO: 1.9 10E3/UL (ref 1.6–8.3)
NEUTROPHILS NFR BLD AUTO: 52 %
NONHDLC SERPL-MCNC: 109 MG/DL
PLATELET # BLD AUTO: 255 10E3/UL (ref 150–450)
POTASSIUM SERPL-SCNC: 3.6 MMOL/L (ref 3.4–5.3)
PROT SERPL-MCNC: 7.1 G/DL (ref 6.4–8.3)
PSA SERPL DL<=0.01 NG/ML-MCNC: 24.3 NG/ML (ref 0–6.5)
RBC # BLD AUTO: 4.61 10E6/UL (ref 4.4–5.9)
SODIUM SERPL-SCNC: 138 MMOL/L (ref 135–145)
TRIGL SERPL-MCNC: 52 MG/DL
WBC # BLD AUTO: 3.6 10E3/UL (ref 4–11)

## 2024-07-01 PROCEDURE — 80053 COMPREHEN METABOLIC PANEL: CPT | Performed by: FAMILY MEDICINE

## 2024-07-01 PROCEDURE — 99397 PER PM REEVAL EST PAT 65+ YR: CPT | Performed by: FAMILY MEDICINE

## 2024-07-01 PROCEDURE — 36415 COLL VENOUS BLD VENIPUNCTURE: CPT | Performed by: FAMILY MEDICINE

## 2024-07-01 PROCEDURE — 99213 OFFICE O/P EST LOW 20 MIN: CPT | Performed by: FAMILY MEDICINE

## 2024-07-01 PROCEDURE — 85025 COMPLETE CBC W/AUTO DIFF WBC: CPT | Performed by: FAMILY MEDICINE

## 2024-07-01 PROCEDURE — 80061 LIPID PANEL: CPT | Performed by: FAMILY MEDICINE

## 2024-07-01 PROCEDURE — G0103 PSA SCREENING: HCPCS | Performed by: FAMILY MEDICINE

## 2024-07-01 RX ORDER — ASPIRIN 81 MG/1
81 TABLET ORAL DAILY
Qty: 90 TABLET | Refills: 3 | Status: SHIPPED | OUTPATIENT
Start: 2024-07-01

## 2024-07-01 RX ORDER — ATORVASTATIN CALCIUM 10 MG/1
10 TABLET, FILM COATED ORAL DAILY
Qty: 90 TABLET | Refills: 3 | Status: SHIPPED | OUTPATIENT
Start: 2024-07-01

## 2024-07-01 RX ORDER — LISINOPRIL AND HYDROCHLOROTHIAZIDE 20; 25 MG/1; MG/1
1 TABLET ORAL DAILY
Qty: 90 TABLET | Refills: 3 | Status: SHIPPED | OUTPATIENT
Start: 2024-07-01

## 2024-07-01 SDOH — HEALTH STABILITY: PHYSICAL HEALTH: ON AVERAGE, HOW MANY DAYS PER WEEK DO YOU ENGAGE IN MODERATE TO STRENUOUS EXERCISE (LIKE A BRISK WALK)?: 4 DAYS

## 2024-07-01 ASSESSMENT — SOCIAL DETERMINANTS OF HEALTH (SDOH): HOW OFTEN DO YOU GET TOGETHER WITH FRIENDS OR RELATIVES?: ONCE A WEEK

## 2024-07-01 NOTE — TELEPHONE ENCOUNTER
Patient Quality Outreach    Patient is due for the following:   Physical Annual Wellness Visit    Next Steps:   Schedule a Annual Wellness Visit    Type of outreach:    Chart review performed, no outreach needed.      Looks like patient is scheduled for today July 1st with Dr. Zuleta for his Annual Wellness    Questions for provider review:    None           Clotilde Gupta CMA

## 2024-07-01 NOTE — PROGRESS NOTES
Preventive Care Visit  [facility]  Palma Zuleta MD, Family Medicine  Jul 1, 2024      Assessment & Plan     Encounter for annual physical exam   Discussed diet, exercise, wellness and other preventive recommendations related to health maintenance.   Follow up as needed for acute issues.    Physical exam recommended in one year.     Fall risks precaution discussed.    - Comprehensive metabolic panel (BMP + Alb, Alk Phos, ALT, AST, Total. Bili, TP); Future  - CBC with platelets and differential; Future  - Comprehensive metabolic panel (BMP + Alb, Alk Phos, ALT, AST, Total. Bili, TP)  - CBC with platelets and differential    Hyperlipidemia with target LDL less than 100  Continue with current med  - Lipid panel reflex to direct LDL Non-fasting; Future  - atorvastatin (LIPITOR) 10 MG tablet; Take 1 tablet (10 mg) by mouth daily  - aspirin 81 MG EC tablet; Take 1 tablet (81 mg) by mouth daily  - Lipid panel reflex to direct LDL Non-fasting    Screening for prostate cancer  Screening.  - PSA, screen; Future  - PSA, screen    Hypertension goal BP (blood pressure) < 140/90  Well controlled  - Comprehensive metabolic panel (BMP + Alb, Alk Phos, ALT, AST, Total. Bili, TP); Future  - lisinopril-hydrochlorothiazide (ZESTORETIC) 20-25 MG tablet; Take 1 tablet by mouth daily  - Comprehensive metabolic panel (BMP + Alb, Alk Phos, ALT, AST, Total. Bili, TP)    Colon cancer screening  Last Colonoscopy, 08/2021, repeat in 3- 5 years.  Patient would like to capo until next year.    Patient has been advised of split billing requirements and indicates understanding: Yes        Nicotine/Tobacco Cessation  He reports that he has been smoking cigars. He has never been exposed to tobacco smoke. He has never used smokeless tobacco.  Nicotine/Tobacco Cessation Plan  Smoke Cigar once in a while.      BMI  Estimated body mass index is 29.09 kg/m  as calculated from the following:    Height as of this encounter:  "1.707 m (5' 7.22\").    Weight as of this encounter: 84.8 kg (187 lb).   Weight management plan: Discussed healthy diet and exercise guidelines    Counseling  Appropriate preventive services were discussed with this patient, including applicable screening as appropriate for fall prevention, nutrition, physical activity, Tobacco-use cessation, weight loss and cognition.  Checklist reviewing preventive services available has been given to the patient.  Reviewed patient's diet, addressing concerns and/or questions.   The patient was instructed to see the dentist every 6 months.       Work on weight loss  Regular exercise    Maty Puga is a 69 year old, presenting for the following:  Physical  Comes for annual exam,  No changes since last year.        7/1/2024     8:28 AM   Additional Questions   Roomed by david clarke ma   Accompanied by self         7/1/2024     8:28 AM   Patient Reported Additional Medications   Patient reports taking the following new medications none        Health Care Directive  Patient does not have a Health Care Directive or Living Will: Discussed advance care planning with patient; however, patient declined at this time.            7/1/2024   General Health   How would you rate your overall physical health? Good   Feel stress (tense, anxious, or unable to sleep) Not at all            7/1/2024   Nutrition   Diet: Breakfast skipped            7/1/2024   Exercise   Days per week of moderate/strenous exercise 4 days            7/1/2024   Social Factors   Frequency of gathering with friends or relatives Once a week   Worry food won't last until get money to buy more No   Food not last or not have enough money for food? No   Do you have housing? (Housing is defined as stable permanent housing and does not include staying ouside in a car, in a tent, in an abandoned building, in an overnight shelter, or couch-surfing.) No   Are you worried about losing your housing? No   Lack of transportation? " No   Unable to get utilities (heat,electricity)? No   Want help with housing or utility concern? No      (!) HOUSING CONCERN PRESENT      7/1/2024   Fall Risk   Fallen 2 or more times in the past year? No    No   Trouble with walking or balance? No    No       Multiple values from one day are sorted in reverse-chronological order          7/1/2024   Activities of Daily Living- Home Safety   Needs help with the following daily activites None of the above   Safety concerns in the home None of the above            7/1/2024   Dental   Dentist two times every year? (!) NO            7/1/2024   Hearing Screening   Hearing concerns? None of the above            7/1/2024   Driving Risk Screening   Patient/family members have concerns about driving No            7/1/2024   General Alertness/Fatigue Screening   Have you been more tired than usual lately? No            7/1/2024   Urinary Incontinence Screening   Bothered by leaking urine in past 6 months No            7/1/2024   TB Screening   Were you born outside of the [place]? No            Today's PHQ-2 Score:       7/1/2024     8:23 AM   PHQ-2 ( 1999 Pfizer)   Q1: Little interest or pleasure in doing things 0   Q2: Feeling down, depressed or hopeless 0   PHQ-2 Score 0   Q1: Little interest or pleasure in doing things Not at all   Q2: Feeling down, depressed or hopeless Not at all   PHQ-2 Score 0           7/1/2024   Substance Use   Alcohol more than 3/day or more than 7/wk No   Do you have a current opioid prescription? No   How severe/bad is pain from 1 to 10? 0/10 (No Pain)   Do you use any other substances recreationally? No        Social History     Tobacco Use    Smoking status: Some Days     Types: Cigars     Passive exposure: Never    Smokeless tobacco: Never    Tobacco comments:     occ   Vaping Use    Vaping status: Never Used   Substance Use Topics    Alcohol use: Yes     Comment: a few times a week    Drug use: No           7/1/2024   AAA Screening   Family history  of Abdominal Aortic Aneurysm (AAA)? No      ASCVD Risk   The 10-year ASCVD risk score (Elton EDMONDS, et al., 2019) is: 20.5%    Values used to calculate the score:      Age: 69 years      Sex: Male      Is Non- : Yes      Diabetic: No      Tobacco smoker: Yes      Systolic Blood Pressure: 105 mmHg      Is BP treated: Yes      HDL Cholesterol: 57 mg/dL      Total Cholesterol: 174 mg/dL    Fracture Risk Assessment Tool  Link to Frax Calculator  Use the information below to complete the Frax calculator  : 1955  Sex: male  Weight (kg): 84.8 kg (actual weight)  Height (cm): 170.7 cm  Previous Fragility Fracture:  Yes  History of parent with fractured hip:  No  Current Smoking:  Yes  Patient has been on glucocorticoids for more than 3 months (5mg/day or more): No  Rheumatoid Arthritis on Problem List:  No  Secondary Osteoporosis on Problem List:  No  Consumes 3 or more units of alcohol per day: No  Femoral Neck BMD (g/cm2)            Reviewed and updated as needed this visit by Provider       Med Hx              Past Medical History:   Diagnosis Date    Hypertension     iamAFT CARE HEAL TRAUM FRAC LOWER LEG 2005    Rectal fistulas     with steton, see outside report     Past Surgical History:   Procedure Laterality Date    BONY PELVIS SURGERY      COLONOSCOPY N/A 2021    Procedure: COLONOSCOPY;  Surgeon: Eugene Jeronimo MD;  Location:  GI     OB History   No obstetric history on file.     Current providers sharing in care for this patient include:  Patient Care Team:  No Ref-Primary, Physician as PCP - Aruna Shahid DO as Assigned PCP    The following health maintenance items are reviewed in Epic and correct as of today:  Health Maintenance   Topic Date Due    ZOSTER IMMUNIZATION (1 of 2) Never done    LUNG CANCER SCREENING  Never done    RSV VACCINE (Pregnancy & 60+) (1 - 1-dose 60+ series) Never done    COVID-19 Vaccine (2023-24 season) 2023     "LIPID  06/26/2024    ANNUAL REVIEW OF HM ORDERS  06/26/2024    NICOTINE/TOBACCO CESSATION COUNSELING Q 1 YR  06/26/2024    MEDICARE ANNUAL WELLNESS VISIT  06/26/2024    COLORECTAL CANCER SCREENING  08/09/2024    INFLUENZA VACCINE (1) 09/01/2024    FALL RISK ASSESSMENT  07/01/2025    GLUCOSE  06/26/2026    ADVANCE CARE PLANNING  06/26/2028    DTAP/TDAP/TD IMMUNIZATION (3 - Td or Tdap) 07/08/2031    HEPATITIS C SCREENING  Completed    PHQ-2 (once per calendar year)  Completed    Pneumococcal Vaccine: 65+ Years  Completed    IPV IMMUNIZATION  Aged Out    HPV IMMUNIZATION  Aged Out    MENINGITIS IMMUNIZATION  Aged Out    RSV MONOCLONAL ANTIBODY  Aged Out    AORTIC ANEURYSM SCREENING (SYSTEM ASSIGNED)  Discontinued         Review of Systems  Constitutional, HEENT, cardiovascular, pulmonary, GI, , musculoskeletal, neuro, skin, endocrine and psych systems are negative, except as otherwise noted.     Objective    Exam  /67 (BP Location: Right arm, Patient Position: Chair, Cuff Size: Adult Regular)   Pulse 88   Temp 98.5  F (36.9  C) (Oral)   Resp 23   Ht 1.707 m (5' 7.22\")   Wt 84.8 kg (187 lb)   SpO2 100%   BMI 29.09 kg/m     Estimated body mass index is 29.09 kg/m  as calculated from the following:    Height as of this encounter: 1.707 m (5' 7.22\").    Weight as of this encounter: 84.8 kg (187 lb).    Physical Exam  GENERAL: alert and no distress  EYES: Eyes grossly normal to inspection, PERRL and conjunctivae and sclerae normal  HENT: ear canals and TM's normal, nose and mouth without ulcers or lesions  NECK: no adenopathy, no asymmetry, masses, or scars  RESP: lungs clear to auscultation - no rales, rhonchi or wheezes  CV: regular rate and rhythm, normal S1 S2, no S3 or S4, no murmur, click or rub, no peripheral edema  ABDOMEN: soft, nontender, no hepatosplenomegaly, no masses and bowel sounds normal  MS: no gross musculoskeletal defects noted, no edema  SKIN: no suspicious lesions or rashes  NEURO: " Normal strength and tone, mentation intact and speech normal  PSYCH: mentation appears normal, affect normal/bright     Orders Placed This Encounter   Procedures    REVIEW OF HEALTH MAINTENANCE PROTOCOL ORDERS    COVID-19 12+ (2023-24) (PFIZER)    Lipid panel reflex to direct LDL Non-fasting    PSA, screen    Comprehensive metabolic panel (BMP + Alb, Alk Phos, ALT, AST, Total. Bili, TP)    CBC with platelets and differential    CBC with platelets and differential          7/1/2024   Mini Cog   Clock Draw Score 2 Normal   3 Item Recall 2 objects recalled   Mini Cog Total Score 4                Signed Electronically by: Palma Zuleta MD

## 2024-07-02 ENCOUNTER — PATIENT OUTREACH (OUTPATIENT)
Dept: ONCOLOGY | Facility: CLINIC | Age: 69
End: 2024-07-02
Payer: COMMERCIAL

## 2024-07-02 DIAGNOSIS — R97.20 ELEVATED PROSTATE SPECIFIC ANTIGEN (PSA): Primary | ICD-10-CM

## 2024-07-02 NOTE — TELEPHONE ENCOUNTER
[facility]: Urology                                                                                       Writer reached out to patient to schedule urology referral per Dr. Vallejo. Dr. Vallejo requesting repeat PSA prior to visit. Voicemail was left for patient to return call to writer to schedule/discuss visist.     Will await return call to discuss.       Garth Vallejo MD Kraus, Tanner, RN  Please get him scheduled with me for a virtual or in person visit.  He needs a repeat PSA prior to his visit.  Thanks        Mahin Yarbrough, RN, BSN.  RN Care Coordinator     [facility]   [phone]

## 2024-07-18 NOTE — TELEPHONE ENCOUNTER
Writer called patient to review plan to have repeat PSA drawn prior to appointment with Dr. Vallejo on 8/9/24.    Patient is scheduled on 7/29/24 at 8AM for labs (PSA).    Patient requested AVS be mailed to him with appointment information and address information for our clinic.  Writer verified address in demographic section with patient.    Writer placed order for PSA.    Writer mailed appointment information to patient.    Diana Briggs RN, BSN, OCN on 7/18/2024 at 2:02 PM

## 2024-07-29 ENCOUNTER — LAB (OUTPATIENT)
Dept: ONCOLOGY | Facility: CLINIC | Age: 69
End: 2024-07-29
Attending: UROLOGY
Payer: COMMERCIAL

## 2024-07-29 DIAGNOSIS — R97.20 ELEVATED PROSTATE SPECIFIC ANTIGEN (PSA): ICD-10-CM

## 2024-07-29 LAB — PSA SERPL DL<=0.01 NG/ML-MCNC: 31.5 NG/ML (ref 0–6.5)

## 2024-07-29 PROCEDURE — 36415 COLL VENOUS BLD VENIPUNCTURE: CPT

## 2024-07-29 PROCEDURE — G0103 PSA SCREENING: HCPCS | Performed by: UROLOGY

## 2024-07-29 NOTE — PROGRESS NOTES
Medical Assistant Note:  Edd Bazan presents today for blood draw.    Patient seen by provider today: No.   present during visit today: Not Applicable.    Concerns: No Concerns.    Procedure:  Lab draw site: right antecub, Needle type: butterfly, Gauge: 23.    Post Assessment:  Labs drawn without difficulty: Yes.    Discharge Plan:  Departure Mode: Ambulatory.    Face to Face Time: 10.    Davina Rausch, CMA

## 2024-07-31 ENCOUNTER — PATIENT OUTREACH (OUTPATIENT)
Dept: ONCOLOGY | Facility: CLINIC | Age: 69
End: 2024-07-31
Payer: COMMERCIAL

## 2024-07-31 DIAGNOSIS — R97.20 ELEVATED PROSTATE SPECIFIC ANTIGEN (PSA): Primary | ICD-10-CM

## 2024-07-31 NOTE — TELEPHONE ENCOUNTER
[facility]: Urology                                                         Writer reached out to patient regarding upcoming appointment with Dr. Vallejo on 8/9/24. Per Dr. Vallejo he would like a prostate MRI completed prior to appointment with patient. Verbal order was obtained and patient scheduled for prostate MRI on 8/8/24. Letter of appointment and directions were mailed to patient today 7/31/24.     Patient had no  further questions or concerns at this time.       Mahin Yarbrough, RN, BSN.  RN Care Coordinator     [facility]   [phone]

## 2024-08-08 ENCOUNTER — ANCILLARY PROCEDURE (OUTPATIENT)
Dept: MRI IMAGING | Facility: CLINIC | Age: 69
End: 2024-08-08
Attending: UROLOGY
Payer: COMMERCIAL

## 2024-08-08 DIAGNOSIS — R97.20 ELEVATED PROSTATE SPECIFIC ANTIGEN (PSA): ICD-10-CM

## 2024-08-08 PROCEDURE — A9585 GADOBUTROL INJECTION: HCPCS | Performed by: STUDENT IN AN ORGANIZED HEALTH CARE EDUCATION/TRAINING PROGRAM

## 2024-08-08 PROCEDURE — 72197 MRI PELVIS W/O & W/DYE: CPT | Performed by: STUDENT IN AN ORGANIZED HEALTH CARE EDUCATION/TRAINING PROGRAM

## 2024-08-08 RX ORDER — GADOBUTROL 604.72 MG/ML
10 INJECTION INTRAVENOUS ONCE
Status: COMPLETED | OUTPATIENT
Start: 2024-08-08 | End: 2024-08-08

## 2024-08-08 RX ADMIN — GADOBUTROL 8.5 ML: 604.72 INJECTION INTRAVENOUS at 12:46

## 2024-08-08 NOTE — TELEPHONE ENCOUNTER
MEDICAL RECORDS REQUEST   [facility]  Urology Clinic  [address] [place] [address]  PHONE: [phone]  Fax: [phone]        FUTURE VISIT INFORMATION                                                   Edd Bazan, : 1955 scheduled for future visit at [facility]    APPOINTMENT INFORMATION:  Date: 2024  Provider:  Garth Vallejo MD  Reason for Visit/Diagnosis: Elevated PSA    REFERRAL INFORMATION:  Referring provider:  Palma Zuleta MD [facility]       RECORDS REQUESTED FOR VISIT                                                     NOTES  STATUS/DETAILS   OFFICE NOTE from referring provider  2024 -- Palma Zuleta MD [facility]    MEDICATION LIST  yes   LABS     PSA  yes   IMAGES  yes, SCHEDULED FOR  2024 - MR PROSTATE     PRE-VISIT CHECKLIST      Joint diagnostic appointment coordinated correctly          (ensure right order & amount of time) Yes   RECORD COLLECTION COMPLETE Yes

## 2024-08-09 ENCOUNTER — OFFICE VISIT (OUTPATIENT)
Dept: ONCOLOGY | Facility: CLINIC | Age: 69
End: 2024-08-09
Attending: UROLOGY
Payer: COMMERCIAL

## 2024-08-09 ENCOUNTER — PRE VISIT (OUTPATIENT)
Dept: UROLOGY | Facility: CLINIC | Age: 69
End: 2024-08-09
Payer: COMMERCIAL

## 2024-08-09 VITALS
WEIGHT: 187.1 LBS | SYSTOLIC BLOOD PRESSURE: 148 MMHG | HEIGHT: 67 IN | RESPIRATION RATE: 16 BRPM | TEMPERATURE: 98.7 F | HEART RATE: 94 BPM | DIASTOLIC BLOOD PRESSURE: 74 MMHG | BODY MASS INDEX: 29.37 KG/M2 | OXYGEN SATURATION: 99 %

## 2024-08-09 DIAGNOSIS — R97.20 ELEVATED PROSTATE SPECIFIC ANTIGEN (PSA): ICD-10-CM

## 2024-08-09 PROCEDURE — 99204 OFFICE O/P NEW MOD 45 MIN: CPT | Performed by: UROLOGY

## 2024-08-09 PROCEDURE — 99213 OFFICE O/P EST LOW 20 MIN: CPT | Performed by: UROLOGY

## 2024-08-09 ASSESSMENT — PAIN SCALES - GENERAL: PAINLEVEL: NO PAIN (0)

## 2024-08-09 NOTE — LETTER
8/9/2024      Edd Bazan  [address]  [facility] [address]      Dear Colleague,    Thank you for referring your patient, Edd Bazan, to the [facility] [place]. Please see a copy of my visit note below.          Chief Complaint:   Elevated PSA          Consult or Referral:     Mr. Edd Bazan is a 69 year old male seen in consultation from Dr. Zuleta.         History of Present Illness:    Edd Bazan is a very pleasant 69 year old male who presents with elevated PSA.  He denies any significant lower urinary tract symptoms.  He has a perianal fistula being managed with Steton.      He lives in [place].  ECOG 0       Past Medical History:     Past Medical History:   Diagnosis Date     Hypertension      iamAFT CARE HEAL TRAUM FRAC LOWER LEG 11/8/2005     Rectal fistulas     with steton, see outside report            Past Surgical History:     Past Surgical History:   Procedure Laterality Date     BONY PELVIS SURGERY       COLONOSCOPY N/A 8/9/2021    Procedure: COLONOSCOPY;  Surgeon: Eugene Jeronimo MD;  Location:  GI            Medications     Current Outpatient Medications   Medication Sig Dispense Refill     aspirin 81 MG EC tablet Take 1 tablet (81 mg) by mouth daily 90 tablet 3     atorvastatin (LIPITOR) 10 MG tablet Take 1 tablet (10 mg) by mouth daily 90 tablet 3     COMPLETE MULTI-PURPOSE 1 daily       Docusate Calcium (STOOL SOFTENER PO) Take  by mouth.       ELDERBERRY PO        FIBER PO Take  by mouth.       lisinopril-hydrochlorothiazide (ZESTORETIC) 20-25 MG tablet Take 1 tablet by mouth daily 90 tablet 3     MAGNESIUM PO        Omega-3 Fatty Acids (FISH OIL PO) Take  by mouth.       VITAMIN D 72852 UNIT OR CAPS every week on friday       VITAMIN E PO        No current facility-administered medications for this visit.            Family History:     Family History   Problem Relation Age of Onset     Heart Disease Father       Breast Cancer Mother             Social History:     Social History     Socioeconomic History     Marital status:      Spouse name: passed away 8/2009     Number of children: Not on file     Years of education: Not on file     Highest education level: Not on file   Occupational History     Employer: [employer]     Comment: [employer]   Tobacco Use     Smoking status: Some Days     Types: Cigars     Passive exposure: Never     Smokeless tobacco: Never     Tobacco comments:     occ   Vaping Use     Vaping status: Never Used   Substance and Sexual Activity     Alcohol use: Yes     Comment: a few times a week     Drug use: No     Sexual activity: Not Currently     Partners: Female   Other Topics Concern     Parent/sibling w/ CABG, MI or angioplasty before 65F 55M? No   Social History Narrative     Not on file     Social Determinants of Health     Financial Resource Strain: Low Risk  (7/1/2024)    Financial Resource Strain      Within the past 12 months, have you or your family members you live with been unable to get utilities (heat, electricity) when it was really needed?: No   Food Insecurity: Low Risk  (7/1/2024)    Food Insecurity      Within the past 12 months, did you worry that your food would run out before you got money to buy more?: No      Within the past 12 months, did the food you bought just not last and you didn t have money to get more?: No   Transportation Needs: Low Risk  (7/1/2024)    Transportation Needs      Within the past 12 months, has lack of transportation kept you from medical appointments, getting your medicines, non-medical meetings or appointments, work, or from getting things that you need?: No   Physical Activity: Unknown (7/1/2024)    Exercise Vital Sign      Days of Exercise per Week: 4 days      Minutes of Exercise per Session: Not on file   Stress: No Stress Concern Present (7/1/2024)    [employer] - Occupational Stress Questionnaire       "Feeling of Stress : Not at all   Social Connections: Unknown (7/1/2024)    Social Connection and Isolation Panel [NHANES]      Frequency of Communication with Friends and Family: Not on file      Frequency of Social Gatherings with Friends and Family: Once a week      Attends Restorationism Services: Not on file      Active Member of Clubs or Organizations: Not on file      Attends Club or Organization Meetings: Not on file      Marital Status: Not on file   Interpersonal Safety: Low Risk  (7/1/2024)    Interpersonal Safety      Do you feel physically and emotionally safe where you currently live?: Yes      Within the past 12 months, have you been hit, slapped, kicked or otherwise physically hurt by someone?: No      Within the past 12 months, have you been humiliated or emotionally abused in other ways by your partner or ex-partner?: No   Housing Stability: High Risk (7/1/2024)    Housing Stability      Do you have housing? : No      Are you worried about losing your housing?: No            Allergies:   No known drug allergy         Review of Systems     10 point ROS of systems including Constitutional, Eyes, Respiratory, Cardiovascular, Gastroenterology, Genitourinary, Integumentary, Muscularskeletal, Psychiatric were all negative except for pertinent positives noted in my HPI.          Physical Exam:     Patient is a 69 year old  male Body mass index is 29.13 kg/m .,  Vitals: Blood pressure (!) 148/74, pulse 94, temperature 98.7  F (37.1  C), temperature source Temporal, resp. rate 16, height 1.707 m (5' 7.21\"), weight 84.9 kg (187 lb 1.6 oz), SpO2 99%.  General Appearance Adult: Alert, no acute distress, oriented  HENT: throat/mouth:normal, good dentition  Neck: No adenopathy,masses or thyromegaly  Lungs: no respiratory distress, or pursed lip breathing  Heart: No obvious jugular venous distension present  Abdomen: soft, nontender, no organomegaly or masses  Lymphatics: No cervical or supraclavicular " adenopathy  Musculoskeltal: extremities normal, no peripheral edema  Skin: no suspicious lesions or rashes  Neuro: Alert, oriented, speech and mentation normal  Psych: affect and mood normal  Gait: Normal  : deferred      Labs and Pathology:    I reviewed all applicable laboratory and pathology data and went over findings with patient  Significant for significantly elevated PSA    Lab Results   Component Value Date/Time    PSA 31.50 (H) 07/29/2024 07:48 AM    PSA 24.30 (H) 07/01/2024 08:53 AM               Imaging:    I independently reviewed all applicable imaging and went over findings with patient.    MR Prostate wo & w Contrast    Result Date: 8/8/2024  MRI PROSTATE: 8/8/2024 1:12 PM CLINICAL HISTORY: Elevated prostate specific antigen (PSA) Most Recent PSA: 31.5 ug/L Comparison: CT pelvis 2007 TECHNIQUE: The following sequences were obtained: High-resolution axial T2-weighted, coronal T2-weighted, 3D volumetric T2-weighted, axial pre-contrast T1, axial diffusion-weighted, axial apparent diffusion coefficient and axial dynamic contrast-enhanced T1. Postcontrast images were evaluated on a separate workstation to evaluate dynamic contrast enhancement. The technique of this exam is PI-RADS v2.1 compliant. Contrast dose: 8.5 mL Gadavist FINDINGS: Suboptimal evaluation. Images degraded by susceptibility artifact secondary to pelvic hardware, predominantly DWI and ADC and postcontrast images especially along the anterior pelvis. Size: 6.4 x 5.8 x 3.7 cm, 71  grams Hemorrhage: Absent Peripheral zone: Heterogeneous on T2-weighted images. Suspicious lesions as detailed below. Transition zone: Enlarged with BPH changes. Transition zone nodules which are circumscribed or mostly encapsulated without diffusion restriction.  PI-RADS 2. Other suspicious nodules as detailed below. Lesion(s) in rank order of severity (highest score- to lowest score, then by size) Lesion 1: Location: Right apex peripheral zone at the 6-7o'clock  position relative to the urethra. Series 8001 image 69. Additional prostate regions involved: None Size: 14 mm T2 description: Heterogeneous signal intensity without obscured margins T2 numerical assessment: 3 DWI description: N/A DWI numerical assessment: N/A DCE assessment: Positive Prostate margin: Capsular abutment 6-15 mm with capsular irregularity Lesion overall PI-RADS category: 4 Lesion 1: Location: Right apex peripheral zone at the 6-7o'clock position relative to the urethra. Series 8001 image 69. Additional prostate regions involved: None Size: 14 mm T2 description: Heterogeneous signal intensity without obscured margins T2 numerical assessment: 3 DWI description: N/A DWI numerical assessment: N/A DCE assessment: Positive Prostate margin: Capsular abutment 6-15 mm with capsular irregularity Lesion overall PI-RADS category: 4 Lesion 2: Location: Left apex peripheral zone at the 4-5 o'clock position relative to the urethra. Series 8001 image 62. Additional prostate regions involved: None Size: 10 mm T2 description: Heterogeneous signal intensity without obscured margins T2 numerical assessment: 3 DWI description: N/A DWI numerical assessment: N/A DCE assessment: Positive Prostate margin: Capsular abutment 6-15 mm Lesion overall PI-RADS category: 4 Neurovascular bundles: The left neurovascular bundle is abutted by lesion 1 (7000 1/22). Seminal vesicles: No seminal vesicle involvement by malignancy. Lymph nodes: No lymph node involvement on limited imaging Bones: Areas of increased enhancement seen along the inferior pubic ramus. (20001/43) Other pelvic organs: Question atrophic/ smaller right testicle. Large left inguinal hernia containing bowel loops of obstruction. Colonic diverticulosis without evidence. Dilated rectum.     IMPRESSION: Suboptimal evaluation due to the susceptibility artifact secondary to pelvic hardware and rectal gas. Nondiagnostic diffusion sequence with limited postcontrast evaluation.  1. Based on the most suspicious abnormality, this exam is characterized as PIRADS 4 - Clinically significant cancer is likely to be present.  The most suspicious abnormality is located at the right apex peripheral zone) and there is capsular irregularity indicating moderately high suspicion of minimal extraprostatic extension. 2. Additional PIRADS 4 - Clinically significant cancer is likely to be present. abnormality is located at the left apex peripheral zone) and there is short segment capsular abutment with low likelihood of minimal extraprostatic extension. 3. Areas of enhancement seen bilateral inferior pubic ramus, indeterminate, consider bone scan for further evaluation 4. Large left inguinal hernia with herniated bowel loops. PIRADS? v2.1 Assessment Categories PIRADS 1: Very low (clinically significant cancer is highly unlikely to be present) PIRADS 2: Low (clinically significant cancer is unlikely to be present) PIRADS 3: Intermediate (the presence of clinically significant cancer is equivocal) PIRADS 4: High (clinically significant cancer is likely to be present) PIRADS 5: Very high (clinically significant cancer is highly likely to be present) I have personally reviewed the examination and initial interpretation and I agree with the findings. KIM ALVAREZ MD   SYSTEM ID:  [id number]             Assessment and Plan:     Assessment:  69-year-old male with significant elevated PSA and PI-RADS 4 lesion on prostate MRI    We discussed the importance of PI-RADS scoring system and the fact the PI-RADS 4 lesion associated with up to 60% risk of prostate cancer.  He understands the neck step is MR fusion biopsy.  The procedure itself and periprocedural course was reviewed.  The risks of the procedure including but not limited to pain and discomfort in the in the anus area, infection, bleeding, and urine retention were discussed.      Plan:  Schedule for MR fusion biopsy    45 total minutes spent on the date of  the encounter including direct interaction with the patient, performing chart review, documentation and further activities as noted above.        Garth Vallejo MD   Department of Urology   [facility]       Again, thank you for allowing me to participate in the care of your patient.        Sincerely,        Garth Vallejo MD

## 2024-08-09 NOTE — NURSING NOTE
"Oncology Rooming Note    August 9, 2024 8:02 AM   Edd Bazan is a 69 year old male who presents for:    Chief Complaint   Patient presents with    Oncology Clinic Visit     Initial Vitals: BP (!) 148/74   Pulse 94   Temp 98.7  F (37.1  C) (Temporal)   Ht 1.707 m (5' 7.21\")   Wt 84.9 kg (187 lb 1.6 oz)   SpO2 99%   BMI 29.13 kg/m   Estimated body mass index is 29.13 kg/m  as calculated from the following:    Height as of this encounter: 1.707 m (5' 7.21\").    Weight as of this encounter: 84.9 kg (187 lb 1.6 oz). Body surface area is 2.01 meters squared.  No Pain (0) Comment: Data Unavailable   No LMP for male patient.  Allergies reviewed: Yes  Medications reviewed: Yes    Medications: Medication refills not needed today.  Pharmacy name entered into [employer]: [id number] [place], [place] - [address]    Frailty Screening:   Is the patient here for a new oncology consult visit in cancer care? 2. No      Clinical concerns: New patient       Veronica Long MA              "

## 2024-08-09 NOTE — PROGRESS NOTES
Chief Complaint:   Elevated PSA          Consult or Referral:     Mr. Edd Bazan is a 69 year old male seen in consultation from Dr. Zuleta.         History of Present Illness:    Edd Bazan is a very pleasant 69 year old male who presents with elevated PSA.  He denies any significant lower urinary tract symptoms.  He has a perianal fistula being managed with Steton.      He lives in [place].  ECOG 0       Past Medical History:     Past Medical History:   Diagnosis Date    Hypertension     iamAFT CARE HEAL TRAUM FRAC LOWER LEG 11/8/2005    Rectal fistulas     with steton, see outside report            Past Surgical History:     Past Surgical History:   Procedure Laterality Date    BONY PELVIS SURGERY      COLONOSCOPY N/A 8/9/2021    Procedure: COLONOSCOPY;  Surgeon: Eugene Jeronimo MD;  Location:  GI            Medications     Current Outpatient Medications   Medication Sig Dispense Refill    aspirin 81 MG EC tablet Take 1 tablet (81 mg) by mouth daily 90 tablet 3    atorvastatin (LIPITOR) 10 MG tablet Take 1 tablet (10 mg) by mouth daily 90 tablet 3    COMPLETE MULTI-PURPOSE 1 daily      Docusate Calcium (STOOL SOFTENER PO) Take  by mouth.      ELDERBERRY PO       FIBER PO Take  by mouth.      lisinopril-hydrochlorothiazide (ZESTORETIC) 20-25 MG tablet Take 1 tablet by mouth daily 90 tablet 3    MAGNESIUM PO       Omega-3 Fatty Acids (FISH OIL PO) Take  by mouth.      VITAMIN D 08443 UNIT OR CAPS every week on friday      VITAMIN E PO        No current facility-administered medications for this visit.            Family History:     Family History   Problem Relation Age of Onset    Heart Disease Father     Breast Cancer Mother             Social History:     Social History     Socioeconomic History    Marital status:      Spouse name: passed away 8/2009    Number of children: Not on file    Years of education: Not on file    Highest education level: Not on file   Occupational  History     Employer: [employer]     Comment: [employer]   Tobacco Use    Smoking status: Some Days     Types: Cigars     Passive exposure: Never    Smokeless tobacco: Never    Tobacco comments:     occ   Vaping Use    Vaping status: Never Used   Substance and Sexual Activity    Alcohol use: Yes     Comment: a few times a week    Drug use: No    Sexual activity: Not Currently     Partners: Female   Other Topics Concern    Parent/sibling w/ CABG, MI or angioplasty before 65F 55M? No   Social History Narrative    Not on file     Social Determinants of Health     Financial Resource Strain: Low Risk  (7/1/2024)    Financial Resource Strain     Within the past 12 months, have you or your family members you live with been unable to get utilities (heat, electricity) when it was really needed?: No   Food Insecurity: Low Risk  (7/1/2024)    Food Insecurity     Within the past 12 months, did you worry that your food would run out before you got money to buy more?: No     Within the past 12 months, did the food you bought just not last and you didn t have money to get more?: No   Transportation Needs: Low Risk  (7/1/2024)    Transportation Needs     Within the past 12 months, has lack of transportation kept you from medical appointments, getting your medicines, non-medical meetings or appointments, work, or from getting things that you need?: No   Physical Activity: Unknown (7/1/2024)    Exercise Vital Sign     Days of Exercise per Week: 4 days     Minutes of Exercise per Session: Not on file   Stress: No Stress Concern Present (7/1/2024)    [employer] - Occupational Stress Questionnaire     Feeling of Stress : Not at all   Social Connections: Unknown (7/1/2024)    Social Connection and Isolation Panel [NHANES]     Frequency of Communication with Friends and Family: Not on file     Frequency of Social Gatherings with Friends and Family: Once a week     Attends [employer] Services: Not on  "file     Active Member of Clubs or Organizations: Not on file     Attends Club or Organization Meetings: Not on file     Marital Status: Not on file   Interpersonal Safety: Low Risk  (7/1/2024)    Interpersonal Safety     Do you feel physically and emotionally safe where you currently live?: Yes     Within the past 12 months, have you been hit, slapped, kicked or otherwise physically hurt by someone?: No     Within the past 12 months, have you been humiliated or emotionally abused in other ways by your partner or ex-partner?: No   Housing Stability: High Risk (7/1/2024)    Housing Stability     Do you have housing? : No     Are you worried about losing your housing?: No            Allergies:   No known drug allergy         Review of Systems     10 point ROS of systems including Constitutional, Eyes, Respiratory, Cardiovascular, Gastroenterology, Genitourinary, Integumentary, Muscularskeletal, Psychiatric were all negative except for pertinent positives noted in my HPI.          Physical Exam:     Patient is a 69 year old  male Body mass index is 29.13 kg/m .,  Vitals: Blood pressure (!) 148/74, pulse 94, temperature 98.7  F (37.1  C), temperature source Temporal, resp. rate 16, height 1.707 m (5' 7.21\"), weight 84.9 kg (187 lb 1.6 oz), SpO2 99%.  General Appearance Adult: Alert, no acute distress, oriented  HENT: throat/mouth:normal, good dentition  Neck: No adenopathy,masses or thyromegaly  Lungs: no respiratory distress, or pursed lip breathing  Heart: No obvious jugular venous distension present  Abdomen: soft, nontender, no organomegaly or masses  Lymphatics: No cervical or supraclavicular adenopathy  Musculoskeltal: extremities normal, no peripheral edema  Skin: no suspicious lesions or rashes  Neuro: Alert, oriented, speech and mentation normal  Psych: affect and mood normal  Gait: Normal  : deferred      Labs and Pathology:    I reviewed all applicable laboratory and pathology data and went over findings " with patient  Significant for significantly elevated PSA    Lab Results   Component Value Date/Time    PSA 31.50 (H) 07/29/2024 07:48 AM    PSA 24.30 (H) 07/01/2024 08:53 AM               Imaging:    I independently reviewed all applicable imaging and went over findings with patient.    MR Prostate wo & w Contrast    Result Date: 8/8/2024  MRI PROSTATE: 8/8/2024 1:12 PM CLINICAL HISTORY: Elevated prostate specific antigen (PSA) Most Recent PSA: 31.5 ug/L Comparison: CT pelvis 2007 TECHNIQUE: The following sequences were obtained: High-resolution axial T2-weighted, coronal T2-weighted, 3D volumetric T2-weighted, axial pre-contrast T1, axial diffusion-weighted, axial apparent diffusion coefficient and axial dynamic contrast-enhanced T1. Postcontrast images were evaluated on a separate workstation to evaluate dynamic contrast enhancement. The technique of this exam is PI-RADS v2.1 compliant. Contrast dose: 8.5 mL Gadavist FINDINGS: Suboptimal evaluation. Images degraded by susceptibility artifact secondary to pelvic hardware, predominantly DWI and ADC and postcontrast images especially along the anterior pelvis. Size: 6.4 x 5.8 x 3.7 cm, 71  grams Hemorrhage: Absent Peripheral zone: Heterogeneous on T2-weighted images. Suspicious lesions as detailed below. Transition zone: Enlarged with BPH changes. Transition zone nodules which are circumscribed or mostly encapsulated without diffusion restriction.  PI-RADS 2. Other suspicious nodules as detailed below. Lesion(s) in rank order of severity (highest score- to lowest score, then by size) Lesion 1: Location: Right apex peripheral zone at the 6-7o'clock position relative to the urethra. Series 8001 image 69. Additional prostate regions involved: None Size: 14 mm T2 description: Heterogeneous signal intensity without obscured margins T2 numerical assessment: 3 DWI description: N/A DWI numerical assessment: N/A DCE assessment: Positive Prostate margin: Capsular abutment 6-15  mm with capsular irregularity Lesion overall PI-RADS category: 4 Lesion 1: Location: Right apex peripheral zone at the 6-7o'clock position relative to the urethra. Series 8001 image 69. Additional prostate regions involved: None Size: 14 mm T2 description: Heterogeneous signal intensity without obscured margins T2 numerical assessment: 3 DWI description: N/A DWI numerical assessment: N/A DCE assessment: Positive Prostate margin: Capsular abutment 6-15 mm with capsular irregularity Lesion overall PI-RADS category: 4 Lesion 2: Location: Left apex peripheral zone at the 4-5 o'clock position relative to the urethra. Series 8001 image 62. Additional prostate regions involved: None Size: 10 mm T2 description: Heterogeneous signal intensity without obscured margins T2 numerical assessment: 3 DWI description: N/A DWI numerical assessment: N/A DCE assessment: Positive Prostate margin: Capsular abutment 6-15 mm Lesion overall PI-RADS category: 4 Neurovascular bundles: The left neurovascular bundle is abutted by lesion 1 (7000 1/22). Seminal vesicles: No seminal vesicle involvement by malignancy. Lymph nodes: No lymph node involvement on limited imaging Bones: Areas of increased enhancement seen along the inferior pubic ramus. (20001/43) Other pelvic organs: Question atrophic/ smaller right testicle. Large left inguinal hernia containing bowel loops of obstruction. Colonic diverticulosis without evidence. Dilated rectum.     IMPRESSION: Suboptimal evaluation due to the susceptibility artifact secondary to pelvic hardware and rectal gas. Nondiagnostic diffusion sequence with limited postcontrast evaluation. 1. Based on the most suspicious abnormality, this exam is characterized as PIRADS 4 - Clinically significant cancer is likely to be present.  The most suspicious abnormality is located at the right apex peripheral zone) and there is capsular irregularity indicating moderately high suspicion of minimal extraprostatic  extension. 2. Additional PIRADS 4 - Clinically significant cancer is likely to be present. abnormality is located at the left apex peripheral zone) and there is short segment capsular abutment with low likelihood of minimal extraprostatic extension. 3. Areas of enhancement seen bilateral inferior pubic ramus, indeterminate, consider bone scan for further evaluation 4. Large left inguinal hernia with herniated bowel loops. PIRADS? v2.1 Assessment Categories PIRADS 1: Very low (clinically significant cancer is highly unlikely to be present) PIRADS 2: Low (clinically significant cancer is unlikely to be present) PIRADS 3: Intermediate (the presence of clinically significant cancer is equivocal) PIRADS 4: High (clinically significant cancer is likely to be present) PIRADS 5: Very high (clinically significant cancer is highly likely to be present) I have personally reviewed the examination and initial interpretation and I agree with the findings. KIM ALVAREZ MD   SYSTEM ID:  [id number]             Assessment and Plan:     Assessment:  69-year-old male with significant elevated PSA and PI-RADS 4 lesion on prostate MRI    We discussed the importance of PI-RADS scoring system and the fact the PI-RADS 4 lesion associated with up to 60% risk of prostate cancer.  He understands the neck step is MR fusion biopsy.  The procedure itself and periprocedural course was reviewed.  The risks of the procedure including but not limited to pain and discomfort in the in the anus area, infection, bleeding, and urine retention were discussed.      Plan:  Schedule for MR fusion biopsy    45 total minutes spent on the date of the encounter including direct interaction with the patient, performing chart review, documentation and further activities as noted above.        Garth Vallejo MD   Department of Urology   [facility]

## 2024-08-30 ENCOUNTER — TELEPHONE (OUTPATIENT)
Dept: UROLOGY | Facility: CLINIC | Age: 69
End: 2024-08-30
Payer: COMMERCIAL

## 2024-08-30 NOTE — TELEPHONE ENCOUNTER
"Patient called in [facility] asking about scheduling an \"exploratory surgery\" with Dr. Vallejo. Patient stated that this was discussed on 8/8 and that they have not heard form anyone yet and the month is already over. Patient would like a call back. Writer did not see a Case Request for patient. Routed to FELIPE [place] and Deborah for assistance. Marium Abebe on 8/30/2024 at 4:23 PM    "

## 2024-09-03 NOTE — TELEPHONE ENCOUNTER
[facility]: Urology                                                                                      Writer follow up with patient regarding urgent need for prostate biopsy that was not scheduled after being requested on 8/9/24. Writer will update Dr. Vallejo and the [facility] urology team about scheduling urgent fusion prostate biopsy. Patient updated possible date of 10/4/24 could be used for biopsy.     Will continue to follow as needed.     Mahin Yarbrough, RN, BSN.  RN Care Coordinator     [facility]   [phone]

## 2024-09-03 NOTE — PATIENT INSTRUCTIONS
Ultrasound Guided Prostate Biopsy    What is a prostate biopsy? A prostate biopsy is a relatively painless procedure performed in the physician's office, outpatient facility or hospital.  A long, thin needle is inserted into the prostate to collect a sample of tissue from the prostate.  These samples are then examined by a pathologist for abnormal cells.    Why do I need a prostate biopsy? During a man's lifetime the prostate gradually increases in size.  The patient may or may not experience symptoms.  Symptoms of an enlarged prostate include:    Increased frequency of urination    Decreased force of urinary stream    Trouble with urination    Awakening at night to urinate    When the prostate is examined, the physician may feel a nodule (hard or firm growth) which would require a biopsy.    Another reason for having a prostate biopsy is an increase in the prostate specific androgen (PSA) in your blood.  A prostate biopsy may be necessary to determine the cause of the increased PSA.    Your physician will also perform an ultrasound (an image created by sound waves).  The ultrasound is performed by placing a small probe in the rectum to image the prostate gland.    Preparation for the Prostate Biopsy    Blood thinning medications must be stopped prior to your biopsy.  Please stop the following medication the appropriate number of days before:  10 days-acetylsalicylic acid, vitamin E, fish oil, aspirin, baby aspirin, tumeric  7 days- Plavix, Brilinta, ibuprofen (Advil, Motrin, Aleve), Celebrex  5 days-Pradaxa  4 days-Coumadin/warfarin  3 days-Eliquis, Xarelto    2.  If you have any bleeding problems (thin blood), please tell your doctor.    3.  If you have been told by another doctor to take antibiotics before dental work or surgery, please tell the doctor.  This is common for patients that have had a joint replacement.    YOU HAVE BEEN PRESCRIBED AN ANTIBIOTIC.  Please take your antibiotic at 8am and 8pm the day  before, day of and day after your biopsy.    The day of the biopsy you will be asked to use an enema 1 1/2-2 hours before you leave for your appointment.    PLEASE EAT YOUR REGULAR MEALS ON THE DAY OF YOUR BIOPSY.    Unless you have been prescribed a sedative (Valium or a similar drug) you will be able to drive to and from your appointment.  If you have taken a sedative you must have a ride.    AFTER THE BIOPSY    DANGER SIGNS    Small amount of blood in the urine for 10-14 days Excessive blood in the urine, stool or ejaculate  Small amount of blood in the stool for 48 hours Fever over 100 or chills  Small amount of blood in the ejaculate for up to Frequent urination or burning when you urinate  4 weeks          CALL THE DOCTOR'S OFFICE -[phone] DURING OFFICE HOURS IF YOU HAVE ANY OF THESE SYMPTOMS.  IF YOU CANNOT CONTACT THE OFFICE, GO TO THE EMERGENCY ROOM.          Your biopsy is scheduled on Friday, 10/4/24, at our [place] office located at [address] [place] [address], [address], [place] [address].  Please be at the office at 3:00 PM.    A follow up visit has been scheduled for you on Friday, 10/11/24, at 9:15 AM.  This is a virtual visit.  Your doctor will discuss your results with you at this visit.

## 2024-10-01 ENCOUNTER — PATIENT OUTREACH (OUTPATIENT)
Dept: ONCOLOGY | Facility: CLINIC | Age: 69
End: 2024-10-01
Payer: COMMERCIAL

## 2024-10-01 DIAGNOSIS — Z79.2 PROPHYLACTIC ANTIBIOTIC: Primary | ICD-10-CM

## 2024-10-01 RX ORDER — CIPROFLOXACIN 500 MG/1
TABLET, FILM COATED ORAL
Qty: 6 TABLET | Refills: 0 | Status: SHIPPED | OUTPATIENT
Start: 2024-10-01

## 2024-10-01 NOTE — PROGRESS NOTES
[facility]: Cancer Care                                                                                          Patient left  for writer to confirm if his antibiotic needed prior to his biopsy has been sent to his pharmacy. Writer confirmed no antibiotic sent to pharmacy and writer sent this today to his local pharmacy.     Writer called patient and spoke with him regarding antibioitc and bowel prep prior to biopsy. Patient does understand the enema prep and potential laxative to help clear out his bowels prior to the biopsy. He had no  further questions or concerns prior to his biopsy this Friday with Dr. Vallejo.     Will continue to follow as needed.     Mahin Yarbrough, RN, BSN.  RN Care Coordinator     [facility]   [phone]

## 2024-10-04 ENCOUNTER — OFFICE VISIT (OUTPATIENT)
Dept: UROLOGY | Facility: CLINIC | Age: 69
End: 2024-10-04
Payer: COMMERCIAL

## 2024-10-04 VITALS
HEIGHT: 68 IN | WEIGHT: 185 LBS | SYSTOLIC BLOOD PRESSURE: 114 MMHG | OXYGEN SATURATION: 94 % | HEART RATE: 104 BPM | DIASTOLIC BLOOD PRESSURE: 73 MMHG | BODY MASS INDEX: 28.04 KG/M2

## 2024-10-04 DIAGNOSIS — R97.20 ELEVATED PROSTATE SPECIFIC ANTIGEN (PSA): Primary | ICD-10-CM

## 2024-10-04 PROCEDURE — 76872 US TRANSRECTAL: CPT | Performed by: UROLOGY

## 2024-10-04 PROCEDURE — 55700 PR BIOPSY OF PROSTATE,NEEDLE/PUNCH: CPT | Performed by: UROLOGY

## 2024-10-04 PROCEDURE — 76999 ECHO EXAMINATION PROCEDURE: CPT | Performed by: UROLOGY

## 2024-10-04 PROCEDURE — G0416 PROSTATE BIOPSY, ANY MTHD: HCPCS | Performed by: PATHOLOGY

## 2024-10-04 PROCEDURE — 88344 IMHCHEM/IMCYTCHM EA MLT ANTB: CPT | Performed by: PATHOLOGY

## 2024-10-04 RX ORDER — GENTAMICIN 40 MG/ML
80 INJECTION, SOLUTION INTRAMUSCULAR; INTRAVENOUS ONCE
Status: COMPLETED | OUTPATIENT
Start: 2024-10-04 | End: 2024-10-04

## 2024-10-04 RX ADMIN — GENTAMICIN 80 MG: 40 INJECTION, SOLUTION INTRAMUSCULAR; INTRAVENOUS at 15:25

## 2024-10-04 ASSESSMENT — PAIN SCALES - GENERAL: PAINLEVEL: NO PAIN (0)

## 2024-10-04 NOTE — PROGRESS NOTES
PREPROCEDURE DIAGNOSIS: Elevated PSA   POSTPROCEDURE DIAGNOSIS: Elevated PSA  PROCEDURE: Transrectal ultrasound sizing and transrectal ultrasound guided prostatic needle biopsy, including MRI fusion targeting  for Edd Bazan who is a 69 year old male.   SURGEON: Garth Vallejo MD   ANESTHESIA: 5 mL of 1% periprostatic block bilaterally   We previously obtained an MRI of the prostate and identified all PIRADS 3-5 lesions for targeting    DESCRIPTION OF PROCEDURE: The procedure, the outcome, the anesthesia, and the risks were discussed with the patient. Informed consent was obtained and signed and a timeout was completed prior to the procedure. Digital rectal examination was performed with the below findings noted. Anesthesia was administered as noted above and the transrectal ultrasound probe was inserted, sizing was performed, and the below findings were noted. 2 core biopsies were taken from each of the MRI targets, and 12 core biopsies were taken as described below. The probe was then withdrawn. Patient tolerated the procedure well.   FINDINGS: Digital rectal exam reveals normal prostate. US images were obtained and then fused with the MRI images.  The fused images were then used to guide the biopsy of the targeted lesions with 2 cores taken of each lesion.  We then performed random biopsies.  12 cores were taken with 6 on each side, base, mid and apex.      Garth Vallejo MD   Department of Urology   [facility]

## 2024-10-04 NOTE — PATIENT INSTRUCTIONS
[facility]  Transrectal Ultrasound  Post Operative Information    The physician who performed your Transrectal Ultrasound is Dr. Vallejo  please call [phone].  Please contact this doctor if you have any problems or questions.  If unable to reach your doctor, please return to the Emergency Department.    Take one antibiotic the evening of the procedure and then as directed on your prescription.  Drink at least 6-8 glasses of fluids for the first 48 hours.  Avoid heavy lifting and strenuous activity for 48 hours.  Avoid sexual intercourse for the first 24 hours.  No aspirin or ibuprofen products (Motrin, Advil, Nuprin, ect.) for one week.  You may take acetaminophen (Tylenol) for pain. You may take 2-500mg extra strength tylenol every 6 hours, not to exceed the daily recommendation of 4,000mg.   You may notice a small amount of blood on the tissue after a bowel movement.  You may pass blood with clots in your urine following the procedure.  The amount will decrease with time but may be visible for up to two weeks. If you have trouble urinating due to a possible blood clot, please call our office.  You make have blood in your semen for 4 weeks after the procedure.  You may experience mild perineal (groin area) discomfort after the procedure. If you were not prescribed a sedative, you may take a tub soak to alleviate groin discomfort.   Please call you doctor if you have any of the follow symptoms:  Fever over 101.1  Increase in the amount of blood passed, dark-red blood cherry color urine/Trouble Urinating  Severe discomfort or pain not well managed with methods above

## 2024-10-04 NOTE — NURSING NOTE
The following medication was given:     MEDICATION: Gentamicin   ROUTE: IM  SITE: LUQ - Gluteus  DOSE:80mg/2ml  LOT #: [id number]  : SysClass  EXPIRATION DATE: 09/2025  NDC#:  69188-205-66  Was there drug waste? No  Multi-dose vial: Yes     Clinic Administered Medication Documentation    Patient was given 80mg/2ml Gentamicin. Prior to medication administration, verified patient's identity using patient s name and date of birth. Please see MAR and medication order for additional information. Patient instructed to remain in clinic for 15 minutes and report any adverse reaction to staff immediately.    Vial/Syringe: Single dose vial. Was entire vial of medication used? Yes   Patient tolerated injection well. Stayed in clinic for up to 15 minutes, with no adverse reaction.    Daylin Garcia LPN

## 2024-10-04 NOTE — LETTER
10/4/2024       RE: Edd Baazn  [address]  [facility] [address]     Dear Colleague,    Thank you for referring your patient, Edd Bazan, to the [facility] [place] [facility]. Please see a copy of my visit note below.    PREPROCEDURE DIAGNOSIS: Elevated PSA   POSTPROCEDURE DIAGNOSIS: Elevated PSA  PROCEDURE: Transrectal ultrasound sizing and transrectal ultrasound guided prostatic needle biopsy, including MRI fusion targeting  for Edd Bazan who is a 69 year old male.   SURGEON: Garth Vallejo MD   ANESTHESIA: 5 mL of 1% periprostatic block bilaterally   We previously obtained an MRI of the prostate and identified all PIRADS 3-5 lesions for targeting    DESCRIPTION OF PROCEDURE: The procedure, the outcome, the anesthesia, and the risks were discussed with the patient. Informed consent was obtained and signed and a timeout was completed prior to the procedure. Digital rectal examination was performed with the below findings noted. Anesthesia was administered as noted above and the transrectal ultrasound probe was inserted, sizing was performed, and the below findings were noted. 2 core biopsies were taken from each of the MRI targets, and 12 core biopsies were taken as described below. The probe was then withdrawn. Patient tolerated the procedure well.   FINDINGS: Digital rectal exam reveals normal prostate. US images were obtained and then fused with the MRI images.  The fused images were then used to guide the biopsy of the targeted lesions with 2 cores taken of each lesion.  We then performed random biopsies.  12 cores were taken with 6 on each side, base, mid and apex.      Garth Vallejo MD   Department of Urology   [facility]         Again, thank you for allowing me to participate in the care of your patient.      Sincerely,    Garth Vallejo MD

## 2024-10-04 NOTE — NURSING NOTE
Procedure was explained to patient prior to performing said procedure.  The patient signed the [place] consent form and all questions were answered prior to the procedure.  Any pre-procedural antibiotics were given according to the performing physician's recommendation.  Patient reviewed information on the labels attached to samples and confirmed the accuracy of all of the labels.     Performing Physician:  Dr. Vallejo  Antibiotic taken?  yes  Aspirin or other blood thinning medications discontinued 7-10 days:  yes  Time of enema:  1300  Patient given Gentamicin intramuscular injection 30 minutes prior to procedure  Yes  Given by Daylin  Twelve samples were taken from the right and left, medial and lateral base, mid and apex of the prostate respectively.  2 additional samples were taken from target area.  Vitals were repeated prior to patient leaving and instructions for post TRUS care were explained to the patient.   Janiya Araujo CMA

## 2024-10-10 LAB
PATH REPORT.COMMENTS IMP SPEC: ABNORMAL
PATH REPORT.COMMENTS IMP SPEC: YES
PATH REPORT.FINAL DX SPEC: ABNORMAL
PATH REPORT.GROSS SPEC: ABNORMAL
PATH REPORT.MICROSCOPIC SPEC OTHER STN: ABNORMAL
PATH REPORT.MICROSCOPIC SPEC OTHER STN: ABNORMAL
PATH REPORT.RELEVANT HX SPEC: ABNORMAL
PHOTO IMAGE: ABNORMAL

## 2024-10-11 ENCOUNTER — VIRTUAL VISIT (OUTPATIENT)
Dept: ONCOLOGY | Facility: CLINIC | Age: 69
End: 2024-10-11
Attending: UROLOGY
Payer: COMMERCIAL

## 2024-10-11 VITALS — HEIGHT: 68 IN | WEIGHT: 187 LBS | BODY MASS INDEX: 28.34 KG/M2

## 2024-10-11 DIAGNOSIS — C61 PROSTATE CANCER (H): Primary | ICD-10-CM

## 2024-10-11 PROCEDURE — 99215 OFFICE O/P EST HI 40 MIN: CPT | Mod: 95 | Performed by: UROLOGY

## 2024-10-11 RX ORDER — CEFAZOLIN SODIUM 2 G/50ML
2 SOLUTION INTRAVENOUS
OUTPATIENT
Start: 2024-10-11

## 2024-10-11 RX ORDER — HEPARIN SODIUM 5000 [USP'U]/.5ML
5000 INJECTION, SOLUTION INTRAVENOUS; SUBCUTANEOUS
OUTPATIENT
Start: 2024-10-11

## 2024-10-11 RX ORDER — CEFAZOLIN SODIUM 2 G/50ML
2 SOLUTION INTRAVENOUS SEE ADMIN INSTRUCTIONS
OUTPATIENT
Start: 2024-10-11

## 2024-10-11 RX ORDER — ACETAMINOPHEN 650 MG/1
650 SUPPOSITORY RECTAL ONCE
OUTPATIENT
Start: 2024-10-11

## 2024-10-11 RX ORDER — ACETAMINOPHEN 325 MG/1
975 TABLET ORAL ONCE
OUTPATIENT
Start: 2024-10-11 | End: 2024-10-11

## 2024-10-11 ASSESSMENT — PAIN SCALES - GENERAL: PAINLEVEL: NO PAIN (0)

## 2024-10-11 NOTE — PROGRESS NOTES
"Virtual Visit Details    Type of service:  Video Visit     Originating Location (pt. Location): Home    Distant Location (provider location):  On-site  Platform used for Video Visit: [facility]    Urology Clinic     HPI  Edd Bazan is a 69 year old male with newly diagnosed prostate cancer, here for follow-up after his prostate biopsy.      The patient denies any major issues after the biopsy     No changes to health, hospitalizations or new diagnoses in the interim    PHYSICAL EXAM  Vitals:  No vitals were obtained today due to virtual visit.    Physical Exam   GENERAL: Healthy, alert and no distress  EYES: Eyes grossly normal to inspection.  No discharge or erythema, or obvious scleral/conjunctival abnormalities.  RESP: No audible wheeze, cough, or visible cyanosis.  No visible retractions or increased work of breathing.    SKIN: Visible skin clear. No significant rash, abnormal pigmentation or lesions.  NEURO: Cranial nerves grossly intact.  Mentation and speech appropriate for age.  PSYCH: Mentation appears normal, affect normal/bright, judgement and insight intact, normal speech and appearance well-groomed.      Labs  Lab Results   Component Value Date    WBC 3.6 07/01/2024    WBC 15.3 03/12/2007     Lab Results   Component Value Date    RBC 4.61 07/01/2024    RBC 4.43 03/12/2007     Lab Results   Component Value Date    HGB 13.6 07/01/2024    HGB 13.4 09/10/2007     Lab Results   Component Value Date    HCT 40.3 07/01/2024    HCT 37.8 03/12/2007     No components found for: \"MCT\"  Lab Results   Component Value Date    MCV 87 07/01/2024    MCV 85 03/12/2007     Lab Results   Component Value Date    MCH 29.5 07/01/2024    MCH 29.5 03/12/2007     Lab Results   Component Value Date    MCHC 33.7 07/01/2024    MCHC 34.5 03/12/2007     Lab Results   Component Value Date    RDW 13.2 07/01/2024    RDW 16.2 03/12/2007     Lab Results   Component Value Date     07/01/2024     03/12/2007        Last " Comprehensive Metabolic Panel:  Sodium   Date Value Ref Range Status   07/01/2024 138 135 - 145 mmol/L Final   07/08/2021 138 133 - 144 mmol/L Final     Potassium   Date Value Ref Range Status   07/01/2024 3.6 3.4 - 5.3 mmol/L Final   07/08/2021 4.0 3.4 - 5.3 mmol/L Final     Chloride   Date Value Ref Range Status   07/01/2024 100 98 - 107 mmol/L Final   07/08/2021 104 94 - 109 mmol/L Final     Carbon Dioxide   Date Value Ref Range Status   07/08/2021 31 20 - 32 mmol/L Final     Carbon Dioxide (CO2)   Date Value Ref Range Status   07/01/2024 27 22 - 29 mmol/L Final     Anion Gap   Date Value Ref Range Status   07/01/2024 11 7 - 15 mmol/L Final   07/08/2021 3 3 - 14 mmol/L Final     Glucose   Date Value Ref Range Status   07/01/2024 106 (H) 70 - 99 mg/dL Final   07/08/2021 98 70 - 99 mg/dL Final     Comment:     Non Fasting     Urea Nitrogen   Date Value Ref Range Status   07/01/2024 18.6 8.0 - 23.0 mg/dL Final   07/08/2021 17 7 - 30 mg/dL Final     Creatinine   Date Value Ref Range Status   07/01/2024 0.83 0.67 - 1.17 mg/dL Final   07/08/2021 0.99 0.66 - 1.25 mg/dL Final     GFR Estimate   Date Value Ref Range Status   07/01/2024 >90 >60 mL/min/1.73m2 Final     Comment:     eGFR calculated using 2021 CKD-EPI equation.   07/08/2021 79 >60 mL/min/[1.73_m2] Final     Comment:     Non  GFR Calc  Starting 12/18/2018, serum creatinine based estimated GFR (eGFR) will be   calculated using the Chronic Kidney Disease Epidemiology Collaboration   (CKD-EPI) equation.       Calcium   Date Value Ref Range Status   07/01/2024 9.6 8.8 - 10.2 mg/dL Final   07/08/2021 9.4 8.5 - 10.1 mg/dL Final     Bilirubin Total   Date Value Ref Range Status   07/01/2024 0.5 <=1.2 mg/dL Final   07/20/2020 0.3 0.2 - 1.3 mg/dL Final     Alkaline Phosphatase   Date Value Ref Range Status   07/01/2024 74 40 - 150 U/L Final   07/20/2020 53 40 - 150 U/L Final     ALT   Date Value Ref Range Status   07/01/2024 16 0 - 70 U/L Final      "Comment:     Reference intervals for this test were updated on 6/12/2023 to more accurately reflect our healthy population. There may be differences in the flagging of prior results with similar values performed with this method. Interpretation of those prior results can be made in the context of the updated reference intervals.     07/20/2020 27 0 - 70 U/L Final     AST   Date Value Ref Range Status   07/01/2024 17 0 - 45 U/L Final     Comment:     Reference intervals for this test were updated on 6/12/2023 to more accurately reflect our healthy population. There may be differences in the flagging of prior results with similar values performed with this method. Interpretation of those prior results can be made in the context of the updated reference intervals.   07/20/2020 12 0 - 45 U/L Final       Prostate Specific Antigen Screen   Date Value Ref Range Status   07/29/2024 31.50 (H) 0.00 - 6.50 ng/mL Final   07/01/2024 24.30 (H) 0.00 - 6.50 ng/mL Final        Surgical pathology  Amendment    At the request of the urologist, the specimen was re-reviewed (by virtual slide review) with Dr. Len Martinez.       On review of the report, a typographical error was found in part G.  The Seymour pattern 4 component was originally reported as \"2%\", instead of the intended \"20%\".  On re-view of the slides from part G, the Robin pattern 4 component is upgraded to 35%.  Within the Seymour pattern 4 areas, scattered cribriform glands are present.       These corrected findings were reported to Dr. Garth Vallejo by Epic email on 10/9/24 at 1550.   Final Diagnosis   A.  Prostate, left base x 2, biopsy:  -Benign prostate acinar and stromal tissue.     B.  Prostate, left mid x 2, biopsy:  -Benign prostate acinar and stromal tissue.     C.  Prostate, left apex x 2, biopsy:  -ADENOCARCINOMA, ACINAR TYPE, GRADE GROUP 1 (Seymour score 3+3=6).   -Tumor involves 1 of 2 cores,  4 % of biopsy tissue, and measures 0.5 mm in " length.  -Negative for perineural invasion.     D.  Prostate, right base x 2, biopsy:  -ADENOCARCINOMA, ACINAR TYPE, GRADE GROUP 1 (Robin score 3+3=6).   -Tumor involves 2 of 2 cores,  33 % of biopsy tissue, and measures 8 mm in combined length.  -Positive for perineural invasion.     E.  Prostate, right mid x 2, biopsy:  -ADENOCARCINOMA, ACINAR TYPE, GRADE GROUP 2 (Robin Score 3+4=7).  -Phil[place] pattern 4 comprises 5 % of the cancer.  -Tumor involves 2 of 2 cores,  50% of biopsy tissue, and measures 13 mm in combined length.  -Positive for perineural invasion.     F.  Prostate, right apex x 2, biopsy:  -ADENOCARCINOMA, ACINAR TYPE, GRADE GROUP 2 (Philadelphia Score 3+4=7).  -Robin pattern 4 comprises 2% of the cancer.  -Tumor involves 2 of 2 cores,  56% of biopsy tissue, and measures 15 mm in combined length.  -Positive for perineural invasion.  -Positive for high-grade prostatic intraepithelial neoplasia (HGPIN).     G.  Prostate, MRI target #1 x 2, biopsy:  -ADENOCARCINOMA, ACINAR TYPE, GRADE GROUP 2 (Philadelphia Score 3+4=7).  -ROBIN PATTERN 4 COMPRISES 35% OF THE CANCER, and cribriform glands are focally present.   -Tumor involves 2 of 2 cores,  78% of biopsy tissue, and measures 19 mm in combined length.  -Positive for perineural invasion.  -Positive for high-grade prostatic intraepithelial neoplasia (HGPIN).     H.  Prostate, MRI target #2 x 2, biopsy: 6  -Atypical small acinar proliferation (ASAP).      Amendment electronically signed by Rossy Wang MD on 10/10/2024 at  8:19 AM     Imaging   MR prostate 8/8/2024    FINDINGS:     Suboptimal evaluation. Images degraded by susceptibility artifact  secondary to pelvic hardware, predominantly DWI and ADC and  postcontrast images especially along the anterior pelvis.     Size: 6.4 x 5.8 x 3.7 cm, 71  grams  Hemorrhage: Absent  Peripheral zone: Heterogeneous on T2-weighted images. Suspicious  lesions as detailed below.  Transition zone: Enlarged with BPH changes.  Transition zone nodules  which are circumscribed or mostly encapsulated without diffusion  restriction.  PI-RADS 2. Other suspicious nodules as detailed below.     Lesion(s) in rank order of severity (highest score- to lowest score,  then by size)      Lesion 1:  Location: Right apex peripheral zone at the 6-7o'clock position  relative to the urethra. Series 8001 image 69.   Additional prostate regions involved: None  Size: 14 mm  T2 description: Heterogeneous signal intensity without obscured  margins  T2 numerical assessment: 3  DWI description: N/A  DWI numerical assessment: N/A  DCE assessment: Positive  Prostate margin: Capsular abutment 6-15 mm with capsular irregularity  Lesion overall PI-RADS category: 4        Lesion 1:  Location: Right apex peripheral zone at the 6-7o'clock position  relative to the urethra. Series 8001 image 69.   Additional prostate regions involved: None  Size: 14 mm  T2 description: Heterogeneous signal intensity without obscured  margins  T2 numerical assessment: 3  DWI description: N/A  DWI numerical assessment: N/A  DCE assessment: Positive  Prostate margin: Capsular abutment 6-15 mm with capsular irregularity  Lesion overall PI-RADS category: 4        Lesion 2:  Location: Left apex peripheral zone at the 4-5 o'clock position  relative to the urethra. Series 8001 image 62.   Additional prostate regions involved: None  Size: 10 mm  T2 description: Heterogeneous signal intensity without obscured  margins  T2 numerical assessment: 3  DWI description: N/A  DWI numerical assessment: N/A  DCE assessment: Positive  Prostate margin: Capsular abutment 6-15 mm   Lesion overall PI-RADS category: 4     Neurovascular bundles: The left neurovascular bundle is abutted by  lesion 1 (7000 1/22).  Seminal vesicles: No seminal vesicle involvement by malignancy.  Lymph nodes: No lymph node involvement on limited imaging  Bones: Areas of increased enhancement seen along the inferior pubic  ramus.  (20001/43)  Other pelvic organs: Question atrophic/ smaller right testicle. Large  left inguinal hernia containing bowel loops of obstruction. Colonic  diverticulosis without evidence. Dilated rectum.                                                                         IMPRESSION:     Suboptimal evaluation due to the susceptibility artifact secondary to  pelvic hardware and rectal gas. Nondiagnostic diffusion sequence with  limited postcontrast evaluation.     1. Based on the most suspicious abnormality, this exam is  characterized as PIRADS 4 - Clinically significant cancer is likely to  be present.  The most suspicious abnormality is located at the right  apex peripheral zone) and there is capsular irregularity indicating  moderately high suspicion of minimal extraprostatic extension.  2. Additional PIRADS 4 - Clinically significant cancer is likely to be  present. abnormality is located at the left apex peripheral zone) and  there is short segment capsular abutment with low likelihood of  minimal extraprostatic extension.  3. Areas of enhancement seen bilateral inferior pubic ramus,  indeterminate, consider bone scan for further evaluation  4. Large left inguinal hernia with herniated bowel loops.       ASSESSMENT AND PLAN  69 year old male with unfavorable intermediate risk prostate cancer     Had a long conversation regarding the management of his intermediate risk prostate cancer.  I told him that the presence of cribriform glands put him at high risk of progression and metastasis in the long run.  We discussed different management options including  surgery versus radiation hormone therapy.  The surgery itself and perioperative course was carefully reviewed.  We also discussed radiation.  Pros and cons of each approach was carefully discussed.  He would like to proceed with surgical procedure.    Plan   Schedule for robotic radical prostatectomy pelvic node dissection  Schedule  for PAC visit    40 total  minutes spent on the date of the encounter including direct interaction with the patient, performing chart review, documentation and further activities as noted above    Garth Vallejo MD   Department of Urology   [facility]

## 2024-10-11 NOTE — LETTER
"10/11/2024      Edd Bazan  [address]  [facility] [address]      Dear Colleague,    Thank you for referring your patient, Edd Bazan, to the [facility]. Please see a copy of my visit note below.    Virtual Visit Details    Type of service:  Video Visit     Originating Location (pt. Location): Home    Distant Location (provider location):  On-site  Platform used for Video Visit: [facility]    Urology Clinic     HPI  Edd Bazan is a 69 year old male with newly diagnosed prostate cancer, here for follow-up after his prostate biopsy.      The patient denies any major issues after the biopsy     No changes to health, hospitalizations or new diagnoses in the interim    PHYSICAL EXAM  Vitals:  No vitals were obtained today due to virtual visit.    Physical Exam   GENERAL: Healthy, alert and no distress  EYES: Eyes grossly normal to inspection.  No discharge or erythema, or obvious scleral/conjunctival abnormalities.  RESP: No audible wheeze, cough, or visible cyanosis.  No visible retractions or increased work of breathing.    SKIN: Visible skin clear. No significant rash, abnormal pigmentation or lesions.  NEURO: Cranial nerves grossly intact.  Mentation and speech appropriate for age.  PSYCH: Mentation appears normal, affect normal/bright, judgement and insight intact, normal speech and appearance well-groomed.      Labs  Lab Results   Component Value Date    WBC 3.6 07/01/2024    WBC 15.3 03/12/2007     Lab Results   Component Value Date    RBC 4.61 07/01/2024    RBC 4.43 03/12/2007     Lab Results   Component Value Date    HGB 13.6 07/01/2024    HGB 13.4 09/10/2007     Lab Results   Component Value Date    HCT 40.3 07/01/2024    HCT 37.8 03/12/2007     No components found for: \"MCT\"  Lab Results   Component Value Date    MCV 87 07/01/2024    MCV 85 03/12/2007     Lab Results   Component Value Date    MCH 29.5 07/01/2024    MCH 29.5 03/12/2007     Lab " Results   Component Value Date    MCHC 33.7 07/01/2024    MCHC 34.5 03/12/2007     Lab Results   Component Value Date    RDW 13.2 07/01/2024    RDW 16.2 03/12/2007     Lab Results   Component Value Date     07/01/2024     03/12/2007        Last Comprehensive Metabolic Panel:  Sodium   Date Value Ref Range Status   07/01/2024 138 135 - 145 mmol/L Final   07/08/2021 138 133 - 144 mmol/L Final     Potassium   Date Value Ref Range Status   07/01/2024 3.6 3.4 - 5.3 mmol/L Final   07/08/2021 4.0 3.4 - 5.3 mmol/L Final     Chloride   Date Value Ref Range Status   07/01/2024 100 98 - 107 mmol/L Final   07/08/2021 104 94 - 109 mmol/L Final     Carbon Dioxide   Date Value Ref Range Status   07/08/2021 31 20 - 32 mmol/L Final     Carbon Dioxide (CO2)   Date Value Ref Range Status   07/01/2024 27 22 - 29 mmol/L Final     Anion Gap   Date Value Ref Range Status   07/01/2024 11 7 - 15 mmol/L Final   07/08/2021 3 3 - 14 mmol/L Final     Glucose   Date Value Ref Range Status   07/01/2024 106 (H) 70 - 99 mg/dL Final   07/08/2021 98 70 - 99 mg/dL Final     Comment:     Non Fasting     Urea Nitrogen   Date Value Ref Range Status   07/01/2024 18.6 8.0 - 23.0 mg/dL Final   07/08/2021 17 7 - 30 mg/dL Final     Creatinine   Date Value Ref Range Status   07/01/2024 0.83 0.67 - 1.17 mg/dL Final   07/08/2021 0.99 0.66 - 1.25 mg/dL Final     GFR Estimate   Date Value Ref Range Status   07/01/2024 >90 >60 mL/min/1.73m2 Final     Comment:     eGFR calculated using 2021 CKD-EPI equation.   07/08/2021 79 >60 mL/min/[1.73_m2] Final     Comment:     Non  GFR Calc  Starting 12/18/2018, serum creatinine based estimated GFR (eGFR) will be   calculated using the Chronic Kidney Disease Epidemiology Collaboration   (CKD-EPI) equation.       Calcium   Date Value Ref Range Status   07/01/2024 9.6 8.8 - 10.2 mg/dL Final   07/08/2021 9.4 8.5 - 10.1 mg/dL Final     Bilirubin Total   Date Value Ref Range Status   07/01/2024 0.5  "<=1.2 mg/dL Final   07/20/2020 0.3 0.2 - 1.3 mg/dL Final     Alkaline Phosphatase   Date Value Ref Range Status   07/01/2024 74 40 - 150 U/L Final   07/20/2020 53 40 - 150 U/L Final     ALT   Date Value Ref Range Status   07/01/2024 16 0 - 70 U/L Final     Comment:     Reference intervals for this test were updated on 6/12/2023 to more accurately reflect our healthy population. There may be differences in the flagging of prior results with similar values performed with this method. Interpretation of those prior results can be made in the context of the updated reference intervals.     07/20/2020 27 0 - 70 U/L Final     AST   Date Value Ref Range Status   07/01/2024 17 0 - 45 U/L Final     Comment:     Reference intervals for this test were updated on 6/12/2023 to more accurately reflect our healthy population. There may be differences in the flagging of prior results with similar values performed with this method. Interpretation of those prior results can be made in the context of the updated reference intervals.   07/20/2020 12 0 - 45 U/L Final       Prostate Specific Antigen Screen   Date Value Ref Range Status   07/29/2024 31.50 (H) 0.00 - 6.50 ng/mL Final   07/01/2024 24.30 (H) 0.00 - 6.50 ng/mL Final        Surgical pathology  Amendment    At the request of the urologist, the specimen was re-reviewed (by virtual slide review) with Dr. Len Martinez.       On review of the report, a typographical error was found in part G.  The [place] pattern 4 component was originally reported as \"2%\", instead of the intended \"20%\".  On re-view of the slides from part G, the [place] pattern 4 component is upgraded to 35%.  Within the [place] pattern 4 areas, scattered cribriform glands are present.       These corrected findings were reported to Dr. Garth Vallejo by Epic email on 10/9/24 at 1550.   Final Diagnosis   A.  Prostate, left base x 2, biopsy:  -Benign prostate acinar and stromal tissue.     B.  Prostate, left mid x " 2, biopsy:  -Benign prostate acinar and stromal tissue.     C.  Prostate, left apex x 2, biopsy:  -ADENOCARCINOMA, ACINAR TYPE, GRADE GROUP 1 (Enfield score 3+3=6).   -Tumor involves 1 of 2 cores,  4 % of biopsy tissue, and measures 0.5 mm in length.  -Negative for perineural invasion.     D.  Prostate, right base x 2, biopsy:  -ADENOCARCINOMA, ACINAR TYPE, GRADE GROUP 1 (Enfield score 3+3=6).   -Tumor involves 2 of 2 cores,  33 % of biopsy tissue, and measures 8 mm in combined length.  -Positive for perineural invasion.     E.  Prostate, right mid x 2, biopsy:  -ADENOCARCINOMA, ACINAR TYPE, GRADE GROUP 2 (Robin Score 3+4=7).  -Robin pattern 4 comprises 5 % of the cancer.  -Tumor involves 2 of 2 cores,  50% of biopsy tissue, and measures 13 mm in combined length.  -Positive for perineural invasion.     F.  Prostate, right apex x 2, biopsy:  -ADENOCARCINOMA, ACINAR TYPE, GRADE GROUP 2 (Robin Score 3+4=7).  -Robin pattern 4 comprises 2% of the cancer.  -Tumor involves 2 of 2 cores,  56% of biopsy tissue, and measures 15 mm in combined length.  -Positive for perineural invasion.  -Positive for high-grade prostatic intraepithelial neoplasia (HGPIN).     G.  Prostate, MRI target #1 x 2, biopsy:  -ADENOCARCINOMA, ACINAR TYPE, GRADE GROUP 2 (Enfield Score 3+4=7).  -ROBIN PATTERN 4 COMPRISES 35% OF THE CANCER, and cribriform glands are focally present.   -Tumor involves 2 of 2 cores,  78% of biopsy tissue, and measures 19 mm in combined length.  -Positive for perineural invasion.  -Positive for high-grade prostatic intraepithelial neoplasia (HGPIN).     H.  Prostate, MRI target #2 x 2, biopsy: 6  -Atypical small acinar proliferation (ASAP).      Amendment electronically signed by Rossy Wang MD on 10/10/2024 at  8:19 AM     Imaging   MR prostate 8/8/2024    FINDINGS:     Suboptimal evaluation. Images degraded by susceptibility artifact  secondary to pelvic hardware, predominantly DWI and ADC and  postcontrast  images especially along the anterior pelvis.     Size: 6.4 x 5.8 x 3.7 cm, 71  grams  Hemorrhage: Absent  Peripheral zone: Heterogeneous on T2-weighted images. Suspicious  lesions as detailed below.  Transition zone: Enlarged with BPH changes. Transition zone nodules  which are circumscribed or mostly encapsulated without diffusion  restriction.  PI-RADS 2. Other suspicious nodules as detailed below.     Lesion(s) in rank order of severity (highest score- to lowest score,  then by size)      Lesion 1:  Location: Right apex peripheral zone at the 6-7o'clock position  relative to the urethra. Series 8001 image 69.   Additional prostate regions involved: None  Size: 14 mm  T2 description: Heterogeneous signal intensity without obscured  margins  T2 numerical assessment: 3  DWI description: N/A  DWI numerical assessment: N/A  DCE assessment: Positive  Prostate margin: Capsular abutment 6-15 mm with capsular irregularity  Lesion overall PI-RADS category: 4        Lesion 1:  Location: Right apex peripheral zone at the 6-7o'clock position  relative to the urethra. Series 8001 image 69.   Additional prostate regions involved: None  Size: 14 mm  T2 description: Heterogeneous signal intensity without obscured  margins  T2 numerical assessment: 3  DWI description: N/A  DWI numerical assessment: N/A  DCE assessment: Positive  Prostate margin: Capsular abutment 6-15 mm with capsular irregularity  Lesion overall PI-RADS category: 4        Lesion 2:  Location: Left apex peripheral zone at the 4-5 o'clock position  relative to the urethra. Series 8001 image 62.   Additional prostate regions involved: None  Size: 10 mm  T2 description: Heterogeneous signal intensity without obscured  margins  T2 numerical assessment: 3  DWI description: N/A  DWI numerical assessment: N/A  DCE assessment: Positive  Prostate margin: Capsular abutment 6-15 mm   Lesion overall PI-RADS category: 4     Neurovascular bundles: The left neurovascular bundle  is abutted by  lesion 1 (7000 1/22).  Seminal vesicles: No seminal vesicle involvement by malignancy.  Lymph nodes: No lymph node involvement on limited imaging  Bones: Areas of increased enhancement seen along the inferior pubic  ramus. (20001/43)  Other pelvic organs: Question atrophic/ smaller right testicle. Large  left inguinal hernia containing bowel loops of obstruction. Colonic  diverticulosis without evidence. Dilated rectum.                                                                         IMPRESSION:     Suboptimal evaluation due to the susceptibility artifact secondary to  pelvic hardware and rectal gas. Nondiagnostic diffusion sequence with  limited postcontrast evaluation.     1. Based on the most suspicious abnormality, this exam is  characterized as PIRADS 4 - Clinically significant cancer is likely to  be present.  The most suspicious abnormality is located at the right  apex peripheral zone) and there is capsular irregularity indicating  moderately high suspicion of minimal extraprostatic extension.  2. Additional PIRADS 4 - Clinically significant cancer is likely to be  present. abnormality is located at the left apex peripheral zone) and  there is short segment capsular abutment with low likelihood of  minimal extraprostatic extension.  3. Areas of enhancement seen bilateral inferior pubic ramus,  indeterminate, consider bone scan for further evaluation  4. Large left inguinal hernia with herniated bowel loops.       ASSESSMENT AND PLAN  69 year old male with unfavorable intermediate risk prostate cancer     Had a long conversation regarding the management of his intermediate risk prostate cancer.  I told him that the presence of cribriform glands put him at high risk of progression and metastasis in the long run.  We discussed different management options including  surgery versus radiation hormone therapy.  The surgery itself and perioperative course was carefully reviewed.  We also  discussed radiation.  Pros and cons of each approach was carefully discussed.  He would like to proceed with surgical procedure.    Plan   Schedule for robotic radical prostatectomy pelvic node dissection  Schedule  for PAC visit    40 total minutes spent on the date of the encounter including direct interaction with the patient, performing chart review, documentation and further activities as noted above    Garth Vallejo MD   Department of Urology   [facility]                   Again, thank you for allowing me to participate in the care of your patient.        Sincerely,        Garth Vallejo MD

## 2024-10-11 NOTE — LETTER
"10/11/2024      Edd Bazan  [address]  [facility] [address]      Dear Colleague,    Thank you for referring your patient, Edd Bazan, to the [facility]. Please see a copy of my visit note below.    Virtual Visit Details    Type of service:  Video Visit     Originating Location (pt. Location): Home    Distant Location (provider location):  On-site  Platform used for Video Visit: [facility]    Urology Clinic     HPI  Edd Bazan is a 69 year old male with newly diagnosed prostate cancer, here for follow-up after his prostate biopsy.      The patient denies any major issues after the biopsy     No changes to health, hospitalizations or new diagnoses in the interim    PHYSICAL EXAM  Vitals:  No vitals were obtained today due to virtual visit.    Physical Exam   GENERAL: Healthy, alert and no distress  EYES: Eyes grossly normal to inspection.  No discharge or erythema, or obvious scleral/conjunctival abnormalities.  RESP: No audible wheeze, cough, or visible cyanosis.  No visible retractions or increased work of breathing.    SKIN: Visible skin clear. No significant rash, abnormal pigmentation or lesions.  NEURO: Cranial nerves grossly intact.  Mentation and speech appropriate for age.  PSYCH: Mentation appears normal, affect normal/bright, judgement and insight intact, normal speech and appearance well-groomed.      Labs  Lab Results   Component Value Date    WBC 3.6 07/01/2024    WBC 15.3 03/12/2007     Lab Results   Component Value Date    RBC 4.61 07/01/2024    RBC 4.43 03/12/2007     Lab Results   Component Value Date    HGB 13.6 07/01/2024    HGB 13.4 09/10/2007     Lab Results   Component Value Date    HCT 40.3 07/01/2024    HCT 37.8 03/12/2007     No components found for: \"MCT\"  Lab Results   Component Value Date    MCV 87 07/01/2024    MCV 85 03/12/2007     Lab Results   Component Value Date    MCH 29.5 07/01/2024    MCH 29.5 03/12/2007     Lab " Results   Component Value Date    MCHC 33.7 07/01/2024    MCHC 34.5 03/12/2007     Lab Results   Component Value Date    RDW 13.2 07/01/2024    RDW 16.2 03/12/2007     Lab Results   Component Value Date     07/01/2024     03/12/2007        Last Comprehensive Metabolic Panel:  Sodium   Date Value Ref Range Status   07/01/2024 138 135 - 145 mmol/L Final   07/08/2021 138 133 - 144 mmol/L Final     Potassium   Date Value Ref Range Status   07/01/2024 3.6 3.4 - 5.3 mmol/L Final   07/08/2021 4.0 3.4 - 5.3 mmol/L Final     Chloride   Date Value Ref Range Status   07/01/2024 100 98 - 107 mmol/L Final   07/08/2021 104 94 - 109 mmol/L Final     Carbon Dioxide   Date Value Ref Range Status   07/08/2021 31 20 - 32 mmol/L Final     Carbon Dioxide (CO2)   Date Value Ref Range Status   07/01/2024 27 22 - 29 mmol/L Final     Anion Gap   Date Value Ref Range Status   07/01/2024 11 7 - 15 mmol/L Final   07/08/2021 3 3 - 14 mmol/L Final     Glucose   Date Value Ref Range Status   07/01/2024 106 (H) 70 - 99 mg/dL Final   07/08/2021 98 70 - 99 mg/dL Final     Comment:     Non Fasting     Urea Nitrogen   Date Value Ref Range Status   07/01/2024 18.6 8.0 - 23.0 mg/dL Final   07/08/2021 17 7 - 30 mg/dL Final     Creatinine   Date Value Ref Range Status   07/01/2024 0.83 0.67 - 1.17 mg/dL Final   07/08/2021 0.99 0.66 - 1.25 mg/dL Final     GFR Estimate   Date Value Ref Range Status   07/01/2024 >90 >60 mL/min/1.73m2 Final     Comment:     eGFR calculated using 2021 CKD-EPI equation.   07/08/2021 79 >60 mL/min/[1.73_m2] Final     Comment:     Non  GFR Calc  Starting 12/18/2018, serum creatinine based estimated GFR (eGFR) will be   calculated using the Chronic Kidney Disease Epidemiology Collaboration   (CKD-EPI) equation.       Calcium   Date Value Ref Range Status   07/01/2024 9.6 8.8 - 10.2 mg/dL Final   07/08/2021 9.4 8.5 - 10.1 mg/dL Final     Bilirubin Total   Date Value Ref Range Status   07/01/2024 0.5  "<=1.2 mg/dL Final   07/20/2020 0.3 0.2 - 1.3 mg/dL Final     Alkaline Phosphatase   Date Value Ref Range Status   07/01/2024 74 40 - 150 U/L Final   07/20/2020 53 40 - 150 U/L Final     ALT   Date Value Ref Range Status   07/01/2024 16 0 - 70 U/L Final     Comment:     Reference intervals for this test were updated on 6/12/2023 to more accurately reflect our healthy population. There may be differences in the flagging of prior results with similar values performed with this method. Interpretation of those prior results can be made in the context of the updated reference intervals.     07/20/2020 27 0 - 70 U/L Final     AST   Date Value Ref Range Status   07/01/2024 17 0 - 45 U/L Final     Comment:     Reference intervals for this test were updated on 6/12/2023 to more accurately reflect our healthy population. There may be differences in the flagging of prior results with similar values performed with this method. Interpretation of those prior results can be made in the context of the updated reference intervals.   07/20/2020 12 0 - 45 U/L Final       Prostate Specific Antigen Screen   Date Value Ref Range Status   07/29/2024 31.50 (H) 0.00 - 6.50 ng/mL Final   07/01/2024 24.30 (H) 0.00 - 6.50 ng/mL Final        Surgical pathology  Amendment    At the request of the urologist, the specimen was re-reviewed (by virtual slide review) with Dr. Len Martinez.       On review of the report, a typographical error was found in part G.  The Salem pattern 4 component was originally reported as \"2%\", instead of the intended \"20%\".  On re-view of the slides from part G, the Salem pattern 4 component is upgraded to 35%.  Within the Robin pattern 4 areas, scattered cribriform glands are present.       These corrected findings were reported to Dr. Garth Vallejo by Epic email on 10/9/24 at 1550.   Final Diagnosis   A.  Prostate, left base x 2, biopsy:  -Benign prostate acinar and stromal tissue.     B.  Prostate, left mid x " 2, biopsy:  -Benign prostate acinar and stromal tissue.     C.  Prostate, left apex x 2, biopsy:  -ADENOCARCINOMA, ACINAR TYPE, GRADE GROUP 1 ([place] score 3+3=6).   -Tumor involves 1 of 2 cores,  4 % of biopsy tissue, and measures 0.5 mm in length.  -Negative for perineural invasion.     D.  Prostate, right base x 2, biopsy:  -ADENOCARCINOMA, ACINAR TYPE, GRADE GROUP 1 ([place] score 3+3=6).   -Tumor involves 2 of 2 cores,  33 % of biopsy tissue, and measures 8 mm in combined length.  -Positive for perineural invasion.     E.  Prostate, right mid x 2, biopsy:  -ADENOCARCINOMA, ACINAR TYPE, GRADE GROUP 2 (Robin Score 3+4=7).  -Robin pattern 4 comprises 5 % of the cancer.  -Tumor involves 2 of 2 cores,  50% of biopsy tissue, and measures 13 mm in combined length.  -Positive for perineural invasion.     F.  Prostate, right apex x 2, biopsy:  -ADENOCARCINOMA, ACINAR TYPE, GRADE GROUP 2 (Robin Score 3+4=7).  -Robin pattern 4 comprises 2% of the cancer.  -Tumor involves 2 of 2 cores,  56% of biopsy tissue, and measures 15 mm in combined length.  -Positive for perineural invasion.  -Positive for high-grade prostatic intraepithelial neoplasia (HGPIN).     G.  Prostate, MRI target #1 x 2, biopsy:  -ADENOCARCINOMA, ACINAR TYPE, GRADE GROUP 2 ([place] Score 3+4=7).  -ROBIN PATTERN 4 COMPRISES 35% OF THE CANCER, and cribriform glands are focally present.   -Tumor involves 2 of 2 cores,  78% of biopsy tissue, and measures 19 mm in combined length.  -Positive for perineural invasion.  -Positive for high-grade prostatic intraepithelial neoplasia (HGPIN).     H.  Prostate, MRI target #2 x 2, biopsy: 6  -Atypical small acinar proliferation (ASAP).      Amendment electronically signed by Rossy Wang MD on 10/10/2024 at  8:19 AM     Imaging   MR prostate 8/8/2024    FINDINGS:     Suboptimal evaluation. Images degraded by susceptibility artifact  secondary to pelvic hardware, predominantly DWI and ADC and  postcontrast  images especially along the anterior pelvis.     Size: 6.4 x 5.8 x 3.7 cm, 71  grams  Hemorrhage: Absent  Peripheral zone: Heterogeneous on T2-weighted images. Suspicious  lesions as detailed below.  Transition zone: Enlarged with BPH changes. Transition zone nodules  which are circumscribed or mostly encapsulated without diffusion  restriction.  PI-RADS 2. Other suspicious nodules as detailed below.     Lesion(s) in rank order of severity (highest score- to lowest score,  then by size)      Lesion 1:  Location: Right apex peripheral zone at the 6-7o'clock position  relative to the urethra. Series 8001 image 69.   Additional prostate regions involved: None  Size: 14 mm  T2 description: Heterogeneous signal intensity without obscured  margins  T2 numerical assessment: 3  DWI description: N/A  DWI numerical assessment: N/A  DCE assessment: Positive  Prostate margin: Capsular abutment 6-15 mm with capsular irregularity  Lesion overall PI-RADS category: 4        Lesion 1:  Location: Right apex peripheral zone at the 6-7o'clock position  relative to the urethra. Series 8001 image 69.   Additional prostate regions involved: None  Size: 14 mm  T2 description: Heterogeneous signal intensity without obscured  margins  T2 numerical assessment: 3  DWI description: N/A  DWI numerical assessment: N/A  DCE assessment: Positive  Prostate margin: Capsular abutment 6-15 mm with capsular irregularity  Lesion overall PI-RADS category: 4        Lesion 2:  Location: Left apex peripheral zone at the 4-5 o'clock position  relative to the urethra. Series 8001 image 62.   Additional prostate regions involved: None  Size: 10 mm  T2 description: Heterogeneous signal intensity without obscured  margins  T2 numerical assessment: 3  DWI description: N/A  DWI numerical assessment: N/A  DCE assessment: Positive  Prostate margin: Capsular abutment 6-15 mm   Lesion overall PI-RADS category: 4     Neurovascular bundles: The left neurovascular bundle  is abutted by  lesion 1 (7000 1/22).  Seminal vesicles: No seminal vesicle involvement by malignancy.  Lymph nodes: No lymph node involvement on limited imaging  Bones: Areas of increased enhancement seen along the inferior pubic  ramus. (20001/43)  Other pelvic organs: Question atrophic/ smaller right testicle. Large  left inguinal hernia containing bowel loops of obstruction. Colonic  diverticulosis without evidence. Dilated rectum.                                                                         IMPRESSION:     Suboptimal evaluation due to the susceptibility artifact secondary to  pelvic hardware and rectal gas. Nondiagnostic diffusion sequence with  limited postcontrast evaluation.     1. Based on the most suspicious abnormality, this exam is  characterized as PIRADS 4 - Clinically significant cancer is likely to  be present.  The most suspicious abnormality is located at the right  apex peripheral zone) and there is capsular irregularity indicating  moderately high suspicion of minimal extraprostatic extension.  2. Additional PIRADS 4 - Clinically significant cancer is likely to be  present. abnormality is located at the left apex peripheral zone) and  there is short segment capsular abutment with low likelihood of  minimal extraprostatic extension.  3. Areas of enhancement seen bilateral inferior pubic ramus,  indeterminate, consider bone scan for further evaluation  4. Large left inguinal hernia with herniated bowel loops.       ASSESSMENT AND PLAN  69 year old male with unfavorable intermediate risk prostate cancer     Had a long conversation regarding the management of his intermediate risk prostate cancer.  I told him that the presence of cribriform glands put him at high risk of progression and metastasis in the long run.  We discussed different management options including  surgery versus radiation hormone therapy.  The surgery itself and perioperative course was carefully reviewed.  We also  discussed radiation.  Pros and cons of each approach was carefully discussed.  He would like to proceed with surgical procedure.    Plan   Schedule for robotic radical prostatectomy pelvic node dissection  Schedule  for PAC visit    40 total minutes spent on the date of the encounter including direct interaction with the patient, performing chart review, documentation and further activities as noted above    Garth Vallejo MD   Department of Urology   [facility]                   Again, thank you for allowing me to participate in the care of your patient.        Sincerely,        Garth Vallejo MD

## 2024-10-11 NOTE — NURSING NOTE
Current patient location: [address] [place] [address] N   [facility] [address]    Is the patient currently in the state of [place]? YES    Visit mode:VIDEO    If the visit is dropped, the patient can be reconnected by: VIDEO VISIT: Text to cell phone:   Telephone Information:   Mobile [phone]       Will anyone else be joining the visit? NO  (If patient encounters technical issues they should call [phone]    Are changes needed to the allergy or medication list? No    Are refills needed on medications prescribed by this physician? NO    Rooming Documentation:  Unable to complete questionnaire(s) due to time    Reason for visit: KOSTAS JOSE

## 2024-10-14 ENCOUNTER — TELEPHONE (OUTPATIENT)
Dept: UROLOGY | Facility: CLINIC | Age: 69
End: 2024-10-14
Payer: COMMERCIAL

## 2024-10-14 NOTE — TELEPHONE ENCOUNTER
Called patient to schedule surgery with Dr. Vallejo    Spoke with:  Patient    Date of Surgery: 12/5/24    Arrival time Discussed with Patient:  No    Location of surgery: [facility] OR     Pre-Op H&P: With PAC on 11/18/24    Post-Op Appts: Needs 10 day post-op. Provider is on vacation during post-op window.    Discussed with patient pre-op RN will call 2-3 days prior to surgery with arrival time and instructions:  Yes     Packet sent out: Yes  via Mail - Standard [DATE] 10/14/24    Additional Comments:  N/A      All patients questions were answered and patient was instructed to review surgical packet and call back with any questions or concerns.       Antionette William on 10/14/2024 at 1:08 PM

## 2024-10-15 NOTE — TELEPHONE ENCOUNTER
FUTURE VISIT INFORMATION      SURGERY INFORMATION:  Date: 12/5/24  Location:  or  Surgeon:  Garth Vallejo MD   Anesthesia Type:  general  Procedure: PROSTATECTOMY, ROBOT-ASSISTED, WITH PELVIC LYMPHADENECTOMY   Consult: virtual visit 10/11/24    RECORDS REQUESTED FROM:       Primary Care Provider: Tay Oshea MD - [facility]    Pertinent Medical History: hypertension

## 2024-11-17 LAB
ABO/RH(D): NORMAL
ANTIBODY SCREEN: NEGATIVE
SPECIMEN EXPIRATION DATE: NORMAL

## 2024-11-18 ENCOUNTER — APPOINTMENT (OUTPATIENT)
Dept: LAB | Facility: CLINIC | Age: 69
End: 2024-11-18
Payer: COMMERCIAL

## 2024-11-18 ENCOUNTER — LAB (OUTPATIENT)
Dept: LAB | Facility: CLINIC | Age: 69
End: 2024-11-18
Payer: COMMERCIAL

## 2024-11-18 ENCOUNTER — OFFICE VISIT (OUTPATIENT)
Dept: SURGERY | Facility: CLINIC | Age: 69
End: 2024-11-18
Payer: COMMERCIAL

## 2024-11-18 ENCOUNTER — PRE VISIT (OUTPATIENT)
Dept: SURGERY | Facility: CLINIC | Age: 69
End: 2024-11-18

## 2024-11-18 VITALS
OXYGEN SATURATION: 98 % | BODY MASS INDEX: 29.51 KG/M2 | HEART RATE: 96 BPM | RESPIRATION RATE: 16 BRPM | WEIGHT: 188 LBS | TEMPERATURE: 98.5 F | DIASTOLIC BLOOD PRESSURE: 77 MMHG | SYSTOLIC BLOOD PRESSURE: 110 MMHG | HEIGHT: 67 IN

## 2024-11-18 DIAGNOSIS — Z01.818 PREOP EXAMINATION: ICD-10-CM

## 2024-11-18 DIAGNOSIS — C61 PROSTATE CANCER (H): ICD-10-CM

## 2024-11-18 DIAGNOSIS — Z01.818 PREOP EXAMINATION: Primary | ICD-10-CM

## 2024-11-18 LAB
ANION GAP SERPL CALCULATED.3IONS-SCNC: 10 MMOL/L (ref 7–15)
BUN SERPL-MCNC: 17.9 MG/DL (ref 8–23)
CALCIUM SERPL-MCNC: 9.4 MG/DL (ref 8.8–10.4)
CHLORIDE SERPL-SCNC: 99 MMOL/L (ref 98–107)
CREAT SERPL-MCNC: 0.78 MG/DL (ref 0.67–1.17)
EGFRCR SERPLBLD CKD-EPI 2021: >90 ML/MIN/1.73M2
ERYTHROCYTE [DISTWIDTH] IN BLOOD BY AUTOMATED COUNT: 13.7 % (ref 10–15)
GLUCOSE SERPL-MCNC: 116 MG/DL (ref 70–99)
HCO3 SERPL-SCNC: 27 MMOL/L (ref 22–29)
HCT VFR BLD AUTO: 39.9 % (ref 40–53)
HGB BLD-MCNC: 13.7 G/DL (ref 13.3–17.7)
MCH RBC QN AUTO: 30.2 PG (ref 26.5–33)
MCHC RBC AUTO-ENTMCNC: 34.3 G/DL (ref 31.5–36.5)
MCV RBC AUTO: 88 FL (ref 78–100)
PLATELET # BLD AUTO: 266 10E3/UL (ref 150–450)
POTASSIUM SERPL-SCNC: 3.8 MMOL/L (ref 3.4–5.3)
RBC # BLD AUTO: 4.54 10E6/UL (ref 4.4–5.9)
SODIUM SERPL-SCNC: 136 MMOL/L (ref 135–145)
WBC # BLD AUTO: 4.2 10E3/UL (ref 4–11)

## 2024-11-18 PROCEDURE — 36415 COLL VENOUS BLD VENIPUNCTURE: CPT | Performed by: PATHOLOGY

## 2024-11-18 PROCEDURE — 99203 OFFICE O/P NEW LOW 30 MIN: CPT | Performed by: PHYSICIAN ASSISTANT

## 2024-11-18 PROCEDURE — 80048 BASIC METABOLIC PNL TOTAL CA: CPT | Performed by: PATHOLOGY

## 2024-11-18 PROCEDURE — 85027 COMPLETE CBC AUTOMATED: CPT | Performed by: PATHOLOGY

## 2024-11-18 ASSESSMENT — ENCOUNTER SYMPTOMS: SEIZURES: 0

## 2024-11-18 ASSESSMENT — PAIN SCALES - GENERAL: PAINLEVEL_OUTOF10: NO PAIN (0)

## 2024-11-18 ASSESSMENT — LIFESTYLE VARIABLES: TOBACCO_USE: 1

## 2024-11-18 NOTE — OR NURSING
Was asked by Krystal SU to go over medications with Edd Bazan for the upcoming surgery at [facility] on 12-5-24. Verbally given medication instructions and written instructions. Pre op showering instructions and reports has Hibiclens. Edd has no questions at this time.

## 2024-11-18 NOTE — PATIENT INSTRUCTIONS
Name:  Edd Bazan   MRN:  [id number]   :  1955   Today's Date:  2024         You were seen today for a pre-operative assessment in the:    Pre-operative Anesthesia Assessment Center(PAC)  [facility]  [address] [place] [id number]  phone [phone]      You will be receiving a call with location, date, arrival time and diet instructions from Preadmission Nursing at your surgical site:    -[facility]: [phone]      Anesthesia recommendations for medications:    Hold Aspirin for 7 days before procedure. Take the last Aspirin on 24, and then hold until surgery.  Hold Multivitamins for 7 days before procedure. Take the last Complete Multi-Purpose on 24, and then hold until surgery.  Hold Herbal medications and Supplements for 7 days before procedure. Take the last Elderberry, Omega-3 Fatty Acids (Fish Oil), and Vitamin E on 24, and then hold until surgery.  Hold Ibuprofen for 1 day before procedure.   Hold Naproxen for 4 days before procedure.   Acetaminophen (Tylenol) is okay to take if needed.      No alcohol or cannabis products for 24 hours before your procedure.        Please DO NOT take the following medications the day of procedure:  Lisinopril-Hydrochlorothiazide (Zestoretic)  Docusate Calcium (Stool Softener)  Fiber  Magnesium        Please take these medications the day of procedure:  Atorvastatin (Lipitor)  Acetaminophen (Tylenol) if needed        How do I prepare myself?  - Please take 2 showers (one the night prior to surgery and one the morning of surgery) using Scrubcare or Hibiclens soap.    Use this soap only from the neck to your toes.     Leave the soap on your skin for one minute--then rinse thoroughly.      You may use your own shampoo and conditioner. No other hair products.   - Please remove all jewelry and body piercings.  - No lotions, deodorants or fragrance.  - No makeup or fingernail polish.   - Bring your  ID and insurance card.    -If you have a Deep Brain Stimulator, a Spinal Cord Stimulator, or any implanted Neuro Device, you must bring the remote to your appointment         For further questions regarding your surgery please call your surgeon's office.

## 2024-11-18 NOTE — H&P
Pre-Operative H & P     CC:  Preoperative exam to assess for increased cardiopulmonary risk while undergoing surgery and anesthesia.    Date of Encounter: 11/18/2024  Primary Care Physician:  No Ref-Primary, Physician     Reason for visit:   Encounter Diagnoses   Name Primary?    Preop examination Yes    Prostate cancer (H)        HPI  Edd Bazan is a 69 year old male who presents for pre-operative H & P in preparation for  Procedure Information       Case: [id number] Date/Time: 12/05/24 1400    Procedure: PROSTATECTOMY, ROBOT-ASSISTED, WITH PELVIC LYMPHADENECTOMY (Pelvis)    Anesthesia type: General    Diagnosis: Prostate cancer (H) [C61]    Pre-op diagnosis: Prostate cancer (H) [C61]    Location:  OR [address] OR    Providers: Garth Vallejo MD            Mr. Bazan has a [facility] signficant for hypertension, HLD, and biopsy proven prostate cancer. He has met with Dr. Vallejo to discuss his treatment options and the above surgery is now planned.    History is obtained from the patient and chart review    Hx of abnormal bleeding or anti-platelet use: ASA 81      Past Medical History  Past Medical History:   Diagnosis Date    Hypertension     iamAFT CARE HEAL TRAUM FRAC LOWER LEG 11/8/2005    Rectal fistulas     with steton, see outside report       Past Surgical History  Past Surgical History:   Procedure Laterality Date    BONY PELVIS SURGERY      COLONOSCOPY N/A 8/9/2021    Procedure: COLONOSCOPY;  Surgeon: Eugene Jeronimo MD;  Location:  GI       Prior to Admission Medications  Current Outpatient Medications   Medication Sig Dispense Refill    aspirin 81 MG EC tablet Take 1 tablet (81 mg) by mouth daily (Patient taking differently: Take 81 mg by mouth every morning.) 90 tablet 3    atorvastatin (LIPITOR) 10 MG tablet Take 1 tablet (10 mg) by mouth daily (Patient taking differently: Take 10 mg by mouth every morning.) 90 tablet 3    COMPLETE MULTI-PURPOSE 1 daily      Docusate Calcium (STOOL  SOFTENER PO) Take by mouth as needed.      ELDERBERRY PO       FIBER PO Take  by mouth.      lisinopril-hydrochlorothiazide (ZESTORETIC) 20-25 MG tablet Take 1 tablet by mouth daily (Patient taking differently: Take 1 tablet by mouth every morning.) 90 tablet 3    MAGNESIUM PO       Omega-3 Fatty Acids (FISH OIL PO) Take  by mouth.      VITAMIN D 59974 UNIT OR CAPS every week on friday      VITAMIN E PO       ciprofloxacin (CIPRO) 500 MG tablet Take 1 tab in the AM and 1 tab in the PM the day before your procedure, day of and day after. (Patient not taking: Reported on 11/18/2024) 6 tablet 0       Allergies  Allergies   Allergen Reactions    No Known Drug Allergy        Social History  Social History     Socioeconomic History    Marital status:      Spouse name: passed away 8/2009    Number of children: Not on file    Years of education: Not on file    Highest education level: Not on file   Occupational History     Employer: [employer]     Comment: [employer]   Tobacco Use    Smoking status: Some Days     Types: Cigars     Passive exposure: Never    Smokeless tobacco: Never    Tobacco comments:     occ   Vaping Use    Vaping status: Never Used   Substance and Sexual Activity    Alcohol use: Yes     Comment: a few times a week    Drug use: No    Sexual activity: Not Currently     Partners: Female   Other Topics Concern    Parent/sibling w/ CABG, MI or angioplasty before 65F 55M? No   Social History Narrative    Not on file     Social Drivers of Health     Financial Resource Strain: Low Risk  (7/1/2024)    Financial Resource Strain     Within the past 12 months, have you or your family members you live with been unable to get utilities (heat, electricity) when it was really needed?: No   Food Insecurity: Low Risk  (7/1/2024)    Food Insecurity     Within the past 12 months, did you worry that your food would run out before you got money to buy more?: No     Within the past 12 months, did the food you  bought just not last and you didn t have money to get more?: No   Transportation Needs: Low Risk  (7/1/2024)    Transportation Needs     Within the past 12 months, has lack of transportation kept you from medical appointments, getting your medicines, non-medical meetings or appointments, work, or from getting things that you need?: No   Physical Activity: Unknown (7/1/2024)    Exercise Vital Sign     Days of Exercise per Week: 4 days     Minutes of Exercise per Session: Not on file   Stress: No Stress Concern Present (7/1/2024)    Citizen of [place] of Occupational Health - Occupational Stress Questionnaire     Feeling of Stress : Not at all   Social Connections: Unknown (7/1/2024)    Social Connection and Isolation Panel [NHANES]     Frequency of Communication with Friends and Family: Not on file     Frequency of Social Gatherings with Friends and Family: Once a week     Attends [employer] Services: Not on file     Active Member of Clubs or Organizations: Not on file     Attends Club or Organization Meetings: Not on file     Marital Status: Not on file   Interpersonal Safety: Low Risk  (7/1/2024)    Interpersonal Safety     Do you feel physically and emotionally safe where you currently live?: Yes     Within the past 12 months, have you been hit, slapped, kicked or otherwise physically hurt by someone?: No     Within the past 12 months, have you been humiliated or emotionally abused in other ways by your partner or ex-partner?: No   Housing Stability: High Risk (7/1/2024)    Housing Stability     Do you have housing? : No     Are you worried about losing your housing?: No       Family History  Family History   Problem Relation Age of Onset    Breast Cancer Mother     Heart Disease Father     Anesthesia Reaction No family hx of     Venous thrombosis No family hx of        Review of Systems  The complete review of systems is negative other than noted in the HPI or here.     Anesthesia Evaluation   Pt has had prior  "anesthetic.     No history of anesthetic complications       ROS/MED HX  ENT/Pulmonary:     (+)     NICK risk factors,  hypertension,         tobacco use, Current use,                    (-) asthma   Neurologic:  - neg neurologic ROS  (-) no seizures and no CVA   Cardiovascular:     (+) Dyslipidemia hypertension- -   -  - -                                      METS/Exercise Tolerance: >4 METS    Hematologic:  - neg hematologic  ROS  (-) history of blood clots and history of blood transfusion   Musculoskeletal:  - neg musculoskeletal ROS     GI/Hepatic:  - neg GI/hepatic ROS  (-) liver disease   Renal/Genitourinary:  - neg Renal ROS     Endo:  - neg endo ROS     Psychiatric/Substance Use:  - neg psychiatric ROS     Infectious Disease:  - neg infectious disease ROS     Malignancy:   (+) Malignancy, History of Prostate.Prostate CA Active status post.      Other:            /77 (BP Location: Right arm, Patient Position: Sitting, Cuff Size: Adult Regular)   Pulse 96   Temp 98.5  F (36.9  C) (Oral)   Resp 16   Ht 1.707 m (5' 7.2\")   Wt 85.3 kg (188 lb)   SpO2 98%   BMI 29.27 kg/m      Physical Exam   Constitutional: Awake, alert, cooperative, no apparent distress, and appears stated age.  Eyes: Pupils equal, round and reactive to light, extra ocular muscles intact, sclera clear, conjunctiva normal.  HENT: Normocephalic, oral pharynx with moist mucus membranes, full dentures. No goiter appreciated.   Respiratory: Clear to auscultation bilaterally, no crackles or wheezing.  Cardiovascular: Regular rate and rhythm, normal S1 and S2, and no murmur noted.  No edema. Palpable pulses to radial and PT arteries.   GI: Not assessed  Lymph/Hematologic: No cervical lymphadenopathy and no supraclavicular lymphadenopathy.  Genitourinary:  deferred  Skin: Warm and dry.    Musculoskeletal: Full ROM of neck. There is no redness, warmth, or swelling of the joints. Gross motor strength is normal.    Neurologic: Awake, alert, " oriented to name, place and time. Cranial nerves II-XII are grossly intact. Gait is normal.   Neuropsychiatric: Calm, cooperative. Normal affect.     Prior Labs/Diagnostic Studies   All labs and imaging personally reviewed     Component      Latest Ref Rng 11/18/2024  12:56 PM   WBC      4.0 - 11.0 10e3/uL 4.2    RBC Count      4.40 - 5.90 10e6/uL 4.54    Hemoglobin      13.3 - 17.7 g/dL 13.7    Hematocrit      40.0 - 53.0 % 39.9 (L)    MCV      78 - 100 fL 88    MCH      26.5 - 33.0 pg 30.2    MCHC      31.5 - 36.5 g/dL 34.3    RDW      10.0 - 15.0 % 13.7    Platelet Count      150 - 450 10e3/uL 266       Legend:  (L) Low    Component      Latest Ref Rng 11/18/2024  12:56 PM   Sodium      135 - 145 mmol/L 136    Potassium      3.4 - 5.3 mmol/L 3.8    Chloride      98 - 107 mmol/L 99    Carbon Dioxide (CO2)      22 - 29 mmol/L 27    Anion Gap      7 - 15 mmol/L 10    Urea Nitrogen      8.0 - 23.0 mg/dL 17.9    Creatinine      0.67 - 1.17 mg/dL 0.78    GFR Estimate      >60 mL/min/1.73m2 >90    Calcium      8.8 - 10.4 mg/dL 9.4    Glucose      70 - 99 mg/dL 116 (H)       Legend:  (H) High    EKG/ stress test - if available please see in ROS above           The patient's records and results personally reviewed by this provider.     Outside records reviewed from: Care Everywhere        Assessment    Edd Bazan is a 69 year old male seen as a PAC referral for risk assessment and optimization for anesthesia.    Plan/Recommendations  Pt will be optimized for the proposed procedure.  See below for details on the assessment, risk, and preoperative recommendations    NEUROLOGY  - No history of TIA, CVA or seizure    -Post Op delirium risk factors:  Age    ENT  - No current airway concerns.  Will need to be reassessed day of surgery.  Mallampati: I  TM: > 3    CARDIAC  - Hypertension, hold lisinopril-hydrochlorothiazide on DOS  - Hyperlipidemia, continue Lipitor  - hold ASA 81 for 7 days prior to DOS  - Denies chest  "pain, SOB, FREEMAN, palpitations      - METS (Metabolic Equivalents)  Patient performs 4 or more METS exercise without symptoms             Total Score: 0      RCRI-Very low risk: Class 1 0.4% complication rate             Total Score: 0        PULMONARY    NICK Medium Risk             Total Score: 3    NICK: Hypertension    NICK: Over 50 ys old    NICK: Male      - Denies asthma or inhaler use    - Tobacco History    History   Smoking Status    Some Days    Types: Cigars   Smokeless Tobacco    Never       GI  - Denies GERD  PONV Low Risk  Total Score: 1           1 AN PONV: Intended Post Op Opioids        /RENAL  - Baseline Creatinine  0.78    ENDOCRINE    - BMI: Estimated body mass index is 29.27 kg/m  as calculated from the following:    Height as of this encounter: 1.707 m (5' 7.2\").    Weight as of this encounter: 85.3 kg (188 lb).  Overweight (BMI 25.0-29.9)  - No history of Diabetes Mellitus    HEME  VTE High Risk 3%             Total Score: 8    VTE: Greater than 59 yrs old    VTE: Male    VTE: Current cancer      - Platelet disfunction second to Aspirin (Becca, many others)      MSK  Patient is NOT Frail             Total Score: 0        Different anesthesia methods/types have been discussed with the patient, but they are aware that the final plan will be decided by the assigned anesthesia provider on the date of service.      The patient is optimized for their procedure. AVS with information on surgery time/arrival time, meds and NPO status given by nursing staff. No further diagnostic testing indicated.      On the day of service:     Prep time: 12 minutes  Visit time: 11 minutes  Documentation time: 14 minutes  ------------------------------------------  Total time: 37 minutes      Krystal Lea PA-C  Preoperative Assessment Center  [facility]  Phone: [phone]  Fax: [phone]    "

## 2024-12-05 ENCOUNTER — HOSPITAL ENCOUNTER (OUTPATIENT)
Facility: CLINIC | Age: 69
End: 2024-12-05
Attending: UROLOGY | Admitting: UROLOGY
Payer: COMMERCIAL

## 2024-12-05 VITALS
HEIGHT: 67 IN | SYSTOLIC BLOOD PRESSURE: 110 MMHG | BODY MASS INDEX: 29.35 KG/M2 | HEART RATE: 92 BPM | DIASTOLIC BLOOD PRESSURE: 72 MMHG | OXYGEN SATURATION: 98 % | RESPIRATION RATE: 16 BRPM | WEIGHT: 187 LBS | TEMPERATURE: 98.1 F

## 2024-12-05 DIAGNOSIS — C61 PROSTATE CANCER (H): ICD-10-CM

## 2024-12-05 LAB
ABO + RH BLD: NORMAL
ANION GAP SERPL CALCULATED.3IONS-SCNC: 14 MMOL/L (ref 7–15)
BLD GP AB SCN SERPL QL: NEGATIVE
BUN SERPL-MCNC: 18.7 MG/DL (ref 8–23)
CALCIUM SERPL-MCNC: 8.7 MG/DL (ref 8.8–10.4)
CHLORIDE SERPL-SCNC: 98 MMOL/L (ref 98–107)
CREAT SERPL-MCNC: 0.8 MG/DL (ref 0.67–1.17)
EGFRCR SERPLBLD CKD-EPI 2021: >90 ML/MIN/1.73M2
GLUCOSE SERPL-MCNC: 119 MG/DL (ref 70–99)
GLUCOSE SERPL-MCNC: 69 MG/DL (ref 70–99)
HCO3 SERPL-SCNC: 23 MMOL/L (ref 22–29)
HGB BLD-MCNC: 12 G/DL (ref 13.3–17.7)
LACTATE SERPL-SCNC: 1.7 MMOL/L (ref 0.7–2)
LACTATE SERPL-SCNC: 2.7 MMOL/L (ref 0.7–2)
PLATELET # BLD AUTO: 207 10E3/UL (ref 150–450)
POTASSIUM SERPL-SCNC: 3.9 MMOL/L (ref 3.4–5.3)
POTASSIUM SERPL-SCNC: 4.1 MMOL/L (ref 3.4–5.3)
SODIUM SERPL-SCNC: 135 MMOL/L (ref 135–145)
SPECIMEN EXP DATE BLD: NORMAL

## 2024-12-05 PROCEDURE — 88305 TISSUE EXAM BY PATHOLOGIST: CPT | Mod: 26 | Performed by: PATHOLOGY

## 2024-12-05 PROCEDURE — 83605 ASSAY OF LACTIC ACID: CPT

## 2024-12-05 PROCEDURE — 250N000009 HC RX 250: Performed by: UROLOGY

## 2024-12-05 PROCEDURE — 88307 TISSUE EXAM BY PATHOLOGIST: CPT | Mod: 26 | Performed by: PATHOLOGY

## 2024-12-05 PROCEDURE — 250N000011 HC RX IP 250 OP 636: Performed by: UROLOGY

## 2024-12-05 PROCEDURE — 88342 IMHCHEM/IMCYTCHM 1ST ANTB: CPT | Mod: 26 | Performed by: PATHOLOGY

## 2024-12-05 PROCEDURE — 88342 IMHCHEM/IMCYTCHM 1ST ANTB: CPT | Mod: TC | Performed by: UROLOGY

## 2024-12-05 PROCEDURE — 258N000003 HC RX IP 258 OP 636

## 2024-12-05 PROCEDURE — 36415 COLL VENOUS BLD VENIPUNCTURE: CPT | Performed by: UROLOGY

## 2024-12-05 PROCEDURE — 999N000141 HC STATISTIC PRE-PROCEDURE NURSING ASSESSMENT: Performed by: UROLOGY

## 2024-12-05 PROCEDURE — 250N000013 HC RX MED GY IP 250 OP 250 PS 637

## 2024-12-05 PROCEDURE — 88344 IMHCHEM/IMCYTCHM EA MLT ANTB: CPT | Mod: 26 | Performed by: PATHOLOGY

## 2024-12-05 PROCEDURE — 258N000001 HC RX 258: Performed by: UROLOGY

## 2024-12-05 PROCEDURE — 36415 COLL VENOUS BLD VENIPUNCTURE: CPT

## 2024-12-05 PROCEDURE — 272N000002 HC OR SUPPLY OTHER OPNP: Performed by: UROLOGY

## 2024-12-05 PROCEDURE — 250N000013 HC RX MED GY IP 250 OP 250 PS 637: Performed by: UROLOGY

## 2024-12-05 PROCEDURE — 250N000025 HC SEVOFLURANE, PER MIN: Performed by: UROLOGY

## 2024-12-05 PROCEDURE — 80048 BASIC METABOLIC PNL TOTAL CA: CPT | Performed by: STUDENT IN AN ORGANIZED HEALTH CARE EDUCATION/TRAINING PROGRAM

## 2024-12-05 PROCEDURE — 84132 ASSAY OF SERUM POTASSIUM: CPT | Performed by: STUDENT IN AN ORGANIZED HEALTH CARE EDUCATION/TRAINING PROGRAM

## 2024-12-05 PROCEDURE — 85018 HEMOGLOBIN: CPT | Performed by: UROLOGY

## 2024-12-05 PROCEDURE — 88344 IMHCHEM/IMCYTCHM EA MLT ANTB: CPT | Mod: TC | Performed by: UROLOGY

## 2024-12-05 PROCEDURE — 83605 ASSAY OF LACTIC ACID: CPT | Performed by: ANESTHESIOLOGY

## 2024-12-05 PROCEDURE — 85049 AUTOMATED PLATELET COUNT: CPT

## 2024-12-05 PROCEDURE — 82947 ASSAY GLUCOSE BLOOD QUANT: CPT | Performed by: UROLOGY

## 2024-12-05 PROCEDURE — 710N000009 HC RECOVERY PHASE 1, LEVEL 1, PER MIN: Performed by: UROLOGY

## 2024-12-05 PROCEDURE — 272N000001 HC OR GENERAL SUPPLY STERILE: Performed by: UROLOGY

## 2024-12-05 PROCEDURE — 86900 BLOOD TYPING SEROLOGIC ABO: CPT | Performed by: STUDENT IN AN ORGANIZED HEALTH CARE EDUCATION/TRAINING PROGRAM

## 2024-12-05 PROCEDURE — 82374 ASSAY BLOOD CARBON DIOXIDE: CPT | Performed by: UROLOGY

## 2024-12-05 PROCEDURE — 88309 TISSUE EXAM BY PATHOLOGIST: CPT | Mod: 26 | Performed by: PATHOLOGY

## 2024-12-05 PROCEDURE — 370N000017 HC ANESTHESIA TECHNICAL FEE, PER MIN: Performed by: UROLOGY

## 2024-12-05 PROCEDURE — 360N000080 HC SURGERY LEVEL 7, PER MIN: Performed by: UROLOGY

## 2024-12-05 RX ORDER — ONDANSETRON 4 MG/1
4 TABLET, ORALLY DISINTEGRATING ORAL EVERY 6 HOURS PRN
Status: DISCONTINUED | OUTPATIENT
Start: 2024-12-05 | End: 2024-12-06 | Stop reason: HOSPADM

## 2024-12-05 RX ORDER — CEFAZOLIN SODIUM/WATER 2 G/20 ML
2 SYRINGE (ML) INTRAVENOUS
Status: COMPLETED | OUTPATIENT
Start: 2024-12-05 | End: 2024-12-05

## 2024-12-05 RX ORDER — LIDOCAINE 40 MG/G
CREAM TOPICAL
Status: DISCONTINUED | OUTPATIENT
Start: 2024-12-05 | End: 2024-12-06 | Stop reason: HOSPADM

## 2024-12-05 RX ORDER — ASPIRIN 81 MG/1
81 TABLET ORAL EVERY MORNING
Status: DISCONTINUED | OUTPATIENT
Start: 2024-12-06 | End: 2024-12-06 | Stop reason: HOSPADM

## 2024-12-05 RX ORDER — HEPARIN SODIUM 5000 [USP'U]/.5ML
5000 INJECTION, SOLUTION INTRAVENOUS; SUBCUTANEOUS EVERY 8 HOURS
Status: DISCONTINUED | OUTPATIENT
Start: 2024-12-06 | End: 2024-12-06 | Stop reason: HOSPADM

## 2024-12-05 RX ORDER — SODIUM CHLORIDE 9 MG/ML
INJECTION, SOLUTION INTRAVENOUS CONTINUOUS
Status: ACTIVE | OUTPATIENT
Start: 2024-12-05 | End: 2024-12-06

## 2024-12-05 RX ORDER — OXYCODONE HYDROCHLORIDE 5 MG/1
10 TABLET ORAL EVERY 4 HOURS PRN
Status: DISCONTINUED | OUTPATIENT
Start: 2024-12-05 | End: 2024-12-06 | Stop reason: HOSPADM

## 2024-12-05 RX ORDER — SIMETHICONE 80 MG
80 TABLET,CHEWABLE ORAL 4 TIMES DAILY PRN
Status: DISCONTINUED | OUTPATIENT
Start: 2024-12-05 | End: 2024-12-06 | Stop reason: HOSPADM

## 2024-12-05 RX ORDER — SODIUM CHLORIDE, SODIUM LACTATE, POTASSIUM CHLORIDE, CALCIUM CHLORIDE 600; 310; 30; 20 MG/100ML; MG/100ML; MG/100ML; MG/100ML
INJECTION, SOLUTION INTRAVENOUS CONTINUOUS
Status: DISCONTINUED | OUTPATIENT
Start: 2024-12-05 | End: 2024-12-05 | Stop reason: HOSPADM

## 2024-12-05 RX ORDER — BUPIVACAINE HYDROCHLORIDE 2.5 MG/ML
INJECTION, SOLUTION INFILTRATION; PERINEURAL PRN
Status: DISCONTINUED | OUTPATIENT
Start: 2024-12-05 | End: 2024-12-05 | Stop reason: HOSPADM

## 2024-12-05 RX ORDER — CALCIUM CARBONATE 500 MG/1
1000 TABLET, CHEWABLE ORAL 3 TIMES DAILY PRN
Status: DISCONTINUED | OUTPATIENT
Start: 2024-12-05 | End: 2024-12-06 | Stop reason: HOSPADM

## 2024-12-05 RX ORDER — HYDROMORPHONE HCL IN WATER/PF 6 MG/30 ML
0.4 PATIENT CONTROLLED ANALGESIA SYRINGE INTRAVENOUS
Status: DISCONTINUED | OUTPATIENT
Start: 2024-12-05 | End: 2024-12-06 | Stop reason: HOSPADM

## 2024-12-05 RX ORDER — PROCHLORPERAZINE MALEATE 5 MG/1
5 TABLET ORAL EVERY 6 HOURS PRN
Status: DISCONTINUED | OUTPATIENT
Start: 2024-12-05 | End: 2024-12-06 | Stop reason: HOSPADM

## 2024-12-05 RX ORDER — NALOXONE HYDROCHLORIDE 0.4 MG/ML
0.4 INJECTION, SOLUTION INTRAMUSCULAR; INTRAVENOUS; SUBCUTANEOUS
Status: DISCONTINUED | OUTPATIENT
Start: 2024-12-05 | End: 2024-12-06 | Stop reason: HOSPADM

## 2024-12-05 RX ORDER — OXYCODONE HYDROCHLORIDE 5 MG/1
5 TABLET ORAL EVERY 4 HOURS PRN
Status: DISCONTINUED | OUTPATIENT
Start: 2024-12-05 | End: 2024-12-06 | Stop reason: HOSPADM

## 2024-12-05 RX ORDER — ONDANSETRON 2 MG/ML
4 INJECTION INTRAMUSCULAR; INTRAVENOUS EVERY 6 HOURS PRN
Status: DISCONTINUED | OUTPATIENT
Start: 2024-12-05 | End: 2024-12-06 | Stop reason: HOSPADM

## 2024-12-05 RX ORDER — ACETAMINOPHEN 325 MG/1
650 TABLET ORAL EVERY 4 HOURS PRN
Status: DISCONTINUED | OUTPATIENT
Start: 2024-12-08 | End: 2024-12-06 | Stop reason: HOSPADM

## 2024-12-05 RX ORDER — CEFAZOLIN SODIUM/WATER 2 G/20 ML
2 SYRINGE (ML) INTRAVENOUS SEE ADMIN INSTRUCTIONS
Status: DISCONTINUED | OUTPATIENT
Start: 2024-12-05 | End: 2024-12-05 | Stop reason: HOSPADM

## 2024-12-05 RX ORDER — ACETAMINOPHEN 325 MG/1
975 TABLET ORAL EVERY 8 HOURS
Status: DISCONTINUED | OUTPATIENT
Start: 2024-12-05 | End: 2024-12-06 | Stop reason: HOSPADM

## 2024-12-05 RX ORDER — ACETAMINOPHEN 650 MG/1
650 SUPPOSITORY RECTAL ONCE
Status: COMPLETED | OUTPATIENT
Start: 2024-12-05 | End: 2024-12-05

## 2024-12-05 RX ORDER — FAMOTIDINE 20 MG/1
20 TABLET, FILM COATED ORAL 2 TIMES DAILY PRN
Status: DISCONTINUED | OUTPATIENT
Start: 2024-12-05 | End: 2024-12-06 | Stop reason: HOSPADM

## 2024-12-05 RX ORDER — POLYETHYLENE GLYCOL 3350 17 G/17G
17 POWDER, FOR SOLUTION ORAL DAILY
Status: DISCONTINUED | OUTPATIENT
Start: 2024-12-06 | End: 2024-12-06 | Stop reason: HOSPADM

## 2024-12-05 RX ORDER — MAGNESIUM HYDROXIDE 1200 MG/15ML
LIQUID ORAL PRN
Status: DISCONTINUED | OUTPATIENT
Start: 2024-12-05 | End: 2024-12-05 | Stop reason: HOSPADM

## 2024-12-05 RX ORDER — NALOXONE HYDROCHLORIDE 0.4 MG/ML
0.2 INJECTION, SOLUTION INTRAMUSCULAR; INTRAVENOUS; SUBCUTANEOUS
Status: DISCONTINUED | OUTPATIENT
Start: 2024-12-05 | End: 2024-12-06 | Stop reason: HOSPADM

## 2024-12-05 RX ORDER — LABETALOL HYDROCHLORIDE 5 MG/ML
20 INJECTION, SOLUTION INTRAVENOUS EVERY 6 HOURS PRN
Status: DISCONTINUED | OUTPATIENT
Start: 2024-12-05 | End: 2024-12-06 | Stop reason: HOSPADM

## 2024-12-05 RX ORDER — CIPROFLOXACIN 500 MG/1
500 TABLET, FILM COATED ORAL EVERY 12 HOURS SCHEDULED
Status: DISCONTINUED | OUTPATIENT
Start: 2024-12-05 | End: 2024-12-06 | Stop reason: HOSPADM

## 2024-12-05 RX ORDER — HYDROMORPHONE HCL IN WATER/PF 6 MG/30 ML
0.2 PATIENT CONTROLLED ANALGESIA SYRINGE INTRAVENOUS
Status: DISCONTINUED | OUTPATIENT
Start: 2024-12-05 | End: 2024-12-06 | Stop reason: HOSPADM

## 2024-12-05 RX ORDER — AMOXICILLIN 250 MG
1 CAPSULE ORAL 2 TIMES DAILY
Status: DISCONTINUED | OUTPATIENT
Start: 2024-12-05 | End: 2024-12-06 | Stop reason: HOSPADM

## 2024-12-05 RX ORDER — HEPARIN SODIUM 5000 [USP'U]/.5ML
5000 INJECTION, SOLUTION INTRAVENOUS; SUBCUTANEOUS
Status: COMPLETED | OUTPATIENT
Start: 2024-12-05 | End: 2024-12-05

## 2024-12-05 RX ORDER — ATORVASTATIN CALCIUM 10 MG/1
10 TABLET, FILM COATED ORAL EVERY MORNING
Status: DISCONTINUED | OUTPATIENT
Start: 2024-12-06 | End: 2024-12-06 | Stop reason: HOSPADM

## 2024-12-05 RX ORDER — ACETAMINOPHEN 325 MG/1
975 TABLET ORAL ONCE
Status: COMPLETED | OUTPATIENT
Start: 2024-12-05 | End: 2024-12-05

## 2024-12-05 RX ADMIN — SODIUM CHLORIDE: 9 INJECTION, SOLUTION INTRAVENOUS at 23:21

## 2024-12-05 RX ADMIN — ACETAMINOPHEN 975 MG: 325 TABLET, FILM COATED ORAL at 13:16

## 2024-12-05 RX ADMIN — HEPARIN SODIUM 5000 UNITS: 5000 INJECTION, SOLUTION INTRAVENOUS; SUBCUTANEOUS at 13:17

## 2024-12-05 RX ADMIN — SENNOSIDES AND DOCUSATE SODIUM 1 TABLET: 50; 8.6 TABLET ORAL at 23:08

## 2024-12-05 RX ADMIN — ACETAMINOPHEN 975 MG: 325 TABLET, FILM COATED ORAL at 23:09

## 2024-12-05 RX ADMIN — CIPROFLOXACIN HYDROCHLORIDE 500 MG: 500 TABLET, FILM COATED ORAL at 23:09

## 2024-12-05 ASSESSMENT — ACTIVITIES OF DAILY LIVING (ADL)
ADLS_ACUITY_SCORE: 15
ADLS_ACUITY_SCORE: 17
ADLS_ACUITY_SCORE: 15
ADLS_ACUITY_SCORE: 17
ADLS_ACUITY_SCORE: 15
ADLS_ACUITY_SCORE: 41
ADLS_ACUITY_SCORE: 15
ADLS_ACUITY_SCORE: 15

## 2024-12-05 NOTE — OR NURSING
Pt brought wallet with 5 credit cards and 462.00 inside.  Wallet was sent to security.  Pt also brought three home meds.  Aspirin, lisinopril, and atorvastatin.  Family not with patient.   MEds placed in belongings bag.  Instructed not to take it.  Verbalized acknowledgment.

## 2024-12-06 VITALS
HEIGHT: 67 IN | TEMPERATURE: 98.5 F | SYSTOLIC BLOOD PRESSURE: 111 MMHG | DIASTOLIC BLOOD PRESSURE: 70 MMHG | OXYGEN SATURATION: 99 % | HEART RATE: 83 BPM | WEIGHT: 187 LBS | BODY MASS INDEX: 29.35 KG/M2 | RESPIRATION RATE: 18 BRPM

## 2024-12-06 LAB
ERYTHROCYTE [DISTWIDTH] IN BLOOD BY AUTOMATED COUNT: 13.7 % (ref 10–15)
HCT VFR BLD AUTO: 32.8 % (ref 40–53)
HGB BLD-MCNC: 10.8 G/DL (ref 13.3–17.7)
LACTATE SERPL-SCNC: 1.5 MMOL/L (ref 0.7–2)
MCH RBC QN AUTO: 29.3 PG (ref 26.5–33)
MCHC RBC AUTO-ENTMCNC: 32.9 G/DL (ref 31.5–36.5)
MCV RBC AUTO: 89 FL (ref 78–100)
PLATELET # BLD AUTO: 202 10E3/UL (ref 150–450)
RBC # BLD AUTO: 3.69 10E6/UL (ref 4.4–5.9)
WBC # BLD AUTO: 6.3 10E3/UL (ref 4–11)

## 2024-12-06 PROCEDURE — 83605 ASSAY OF LACTIC ACID: CPT

## 2024-12-06 PROCEDURE — 85018 HEMOGLOBIN: CPT

## 2024-12-06 PROCEDURE — 250N000013 HC RX MED GY IP 250 OP 250 PS 637

## 2024-12-06 PROCEDURE — 36415 COLL VENOUS BLD VENIPUNCTURE: CPT

## 2024-12-06 PROCEDURE — 85041 AUTOMATED RBC COUNT: CPT

## 2024-12-06 RX ORDER — OXYCODONE HYDROCHLORIDE 5 MG/1
5 TABLET ORAL EVERY 6 HOURS PRN
Qty: 12 TABLET | Refills: 0 | Status: SHIPPED | OUTPATIENT
Start: 2024-12-06 | End: 2024-12-09

## 2024-12-06 RX ORDER — AMOXICILLIN 250 MG
1 CAPSULE ORAL DAILY
Qty: 30 TABLET | Refills: 0 | Status: SHIPPED | OUTPATIENT
Start: 2024-12-06

## 2024-12-06 RX ADMIN — POLYETHYLENE GLYCOL 3350 17 G: 17 POWDER, FOR SOLUTION ORAL at 08:15

## 2024-12-06 RX ADMIN — ACETAMINOPHEN 975 MG: 325 TABLET, FILM COATED ORAL at 05:45

## 2024-12-06 RX ADMIN — SIMETHICONE 80 MG: 80 TABLET, CHEWABLE ORAL at 08:19

## 2024-12-06 RX ADMIN — ASPIRIN 81 MG: 81 TABLET, COATED ORAL at 08:15

## 2024-12-06 RX ADMIN — SENNOSIDES AND DOCUSATE SODIUM 1 TABLET: 50; 8.6 TABLET ORAL at 08:16

## 2024-12-06 RX ADMIN — CIPROFLOXACIN HYDROCHLORIDE 500 MG: 500 TABLET, FILM COATED ORAL at 08:15

## 2024-12-06 RX ADMIN — SIMETHICONE 80 MG: 80 TABLET, CHEWABLE ORAL at 02:49

## 2024-12-06 RX ADMIN — ATORVASTATIN CALCIUM 10 MG: 10 TABLET, FILM COATED ORAL at 08:15

## 2024-12-06 ASSESSMENT — ACTIVITIES OF DAILY LIVING (ADL)
ADLS_ACUITY_SCORE: 18
ADLS_ACUITY_SCORE: 17
ADLS_ACUITY_SCORE: 18
ADLS_ACUITY_SCORE: 17
ADLS_ACUITY_SCORE: 18
ADLS_ACUITY_SCORE: 17
ADLS_ACUITY_SCORE: 17
ADLS_ACUITY_SCORE: 18

## 2024-12-06 NOTE — PROGRESS NOTES
Patient VSS are at baseline Yes    Patient able to ambulate as they were prior to admission or with assist devices provided by therapies during their stay Yes    Patient able to tolerate oral intake and maintain hydration status Not Met    Patient has adequate pain control using oral analgesics Yes, pt worse pain is from gas pain.     Patient must be voiding adequate amounts Not met, ozuna    Hypercapnia, Hypoventilation or hypoxia resolved for at least 2 hours without supplemental oxygen Yes    Deficits in sensation, mobility or coordination have resolved if spinal or regional anesthesia used Yes    Patient has returned to baseline mental status Yes       Significant Information: Pt neris Hu is driving him home. Needs to be called before discharge. Number in chart.

## 2024-12-06 NOTE — PROGRESS NOTES
"Urology  Progress Note    Feels well this morning  Pain well-controlled  Eating and is hungry    Exam  /70 (BP Location: Right arm)   Pulse 83   Temp 98.5  F (36.9  C) (Oral)   Resp 18   Ht 1.702 m (5' 7\")   Wt 84.8 kg (187 lb)   SpO2 99%   BMI 29.29 kg/m    No acute distress  Unlabored breathing on room air   Abdomen soft, appropriately tender, nondistended. Incisions remain clean dry intact  LOS serosanguinous  Adame with clear yellow urine in tubing        Labs  AM labs pending    Assessment/Plan  69 year old y/o male POD#0 s/p RALP     Neuro: tylenol and oxy/dilaudid PRNs  for pain control  CV: KESHIA  Pulm: incentive spirometry while awake  FEN/GI: regular diet, MIVF @ dc/hr  Endo: KESHIA  : Adame to remain  Heme/ID: No infectious concerns at this time  Activity: Up ad andrew, goal to ambulate QID   PPx: SCDs in bed, SQH  Dispo: Home today     Discussed with Dr. Yoni Coulter MD   Urology Resident PGY-5         "

## 2024-12-06 NOTE — PROGRESS NOTES
Patient VSS are at baseline Yes    Patient able to ambulate as they were prior to admission or with assist devices provided by therapies during their stay Yes    Patient able to tolerate oral intake and maintain hydration status Not Met    Patient has adequate pain control using oral analgesics Yes    Patient must be voiding adequate amounts Not met, ozuna    Hypercapnia, Hypoventilation or hypoxia resolved for at least 2 hours without supplemental oxygen Yes    Deficits in sensation, mobility or coordination have resolved if spinal or regional anesthesia used Yes    Patient has returned to baseline mental status Yes

## 2024-12-06 NOTE — BRIEF OP NOTE
[facility]    Brief Operative Note    Pre-operative diagnosis: Prostate cancer (H) [C61]  Post-operative diagnosis Same as pre-operative diagnosis    Procedure: PROSTATECTOMY, ROBOT-ASSISTED, WITH RIGHT PELVIC LYMPHADENECTOMY, N/A - Pelvis    Surgeon: Surgeons and Role:     * Garth Vallejo MD - Primary  Anesthesia: General   Estimated Blood Loss: 150 mL     Drains: Adame, LOS   Specimens:   ID Type Source Tests Collected by Time Destination   1 : right pelvic lymph nodes Tissue Lymph Node(s), Pelvis, Right SURGICAL PATHOLOGY EXAM Garth Vallejo MD 12/5/2024  8:11 PM    2 : prostate and seminal vesicles Tissue Prostate SURGICAL PATHOLOGY EXAM Garth Vallejo MD 12/5/2024  8:11 PM    5 : posterior bladder neck Tissue Urinary Bladder SURGICAL PATHOLOGY EXAM Garth Vallejo MD 12/5/2024  6:50 PM      Findings:   Difficult case due to prior surgery and pelvic fracture, successful RALP  .  Complications: None.  Implants: * No implants in log *

## 2024-12-06 NOTE — OR NURSING
Phlebotomy called 2 times over 60 minutes to notify them of stat labs. They were unable to come for unknown reasons. RN attempted venipuncture but was only able to get enough blood for lactic acid which was sent. Patient very stable in pacu so new orders placed to be drawn on the floor later this evening for hemoglobin and bmp when phlebotomy available.

## 2024-12-12 LAB
PATH REPORT.COMMENTS IMP SPEC: ABNORMAL
PATH REPORT.COMMENTS IMP SPEC: ABNORMAL
PATH REPORT.COMMENTS IMP SPEC: YES
PATH REPORT.FINAL DX SPEC: ABNORMAL
PATH REPORT.GROSS SPEC: ABNORMAL
PATH REPORT.MICROSCOPIC SPEC OTHER STN: ABNORMAL
PATH REPORT.MICROSCOPIC SPEC OTHER STN: ABNORMAL
PATH REPORT.RELEVANT HX SPEC: ABNORMAL
PATHOLOGY SYNOPTIC REPORT: ABNORMAL
PHOTO IMAGE: ABNORMAL
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
## 2024-12-14 NOTE — RESULT ENCOUNTER NOTE
I have personally reviewed the pathology results which support a diagnosis of prostatic adenocarcinoma.  Sites involved in this diagnosis include: Prostate and pelvic lymph nodes       Garth Vallejo MD

## 2024-12-18 ENCOUNTER — ALLIED HEALTH/NURSE VISIT (OUTPATIENT)
Dept: UROLOGY | Facility: CLINIC | Age: 69
End: 2024-12-18
Payer: COMMERCIAL

## 2024-12-18 DIAGNOSIS — C61 PROSTATE CANCER (H): Primary | ICD-10-CM

## 2024-12-18 RX ORDER — CIPROFLOXACIN 500 MG/1
500 TABLET, FILM COATED ORAL ONCE
Status: COMPLETED | OUTPATIENT
Start: 2024-12-18 | End: 2024-12-18

## 2024-12-18 RX ADMIN — CIPROFLOXACIN 500 MG: 500 TABLET, FILM COATED ORAL at 14:30

## 2024-12-18 NOTE — PROGRESS NOTES
Edd STACK Beni comes into clinic today at the request of Dr. Vallejo with the diagnosis of prostate cancer for a catheter removal.    Order has been verified: Yes.    Allergies   Allergen Reactions    No Known Drug Allergy        The following medication was given:     MEDICATION: Ciprofloxacin  ROUTE: PO  SITE: Medication was given orally  DOSE: 500mg  LOT #: [id number]  : [employer]  EXPIRATION DATE: 11/2025  NDC#: 9601-6261-34   Was there drug waste? No    Prior to medication administration, verified patient identity using patient's name and date of birth.  Due to medication administration, patient instructed to remain in clinic for 15 minutes  afterwards, and to report any adverse reaction to me immediately.    Removal:  16 Fr straight tipped latex ozuna catheter removed from urethral meatus without difficulty after removing 10 mL of fluid from the balloon.      Patient did tolerate procedure well.     Patient instructed as to where to call or go for pain, fever, leakage, or decreased urine flow.     Pt reported normal appetite and bowel movements. Incision sites are clean, dry, and intact. Pt reports he is feeling good post surgery.    This service provided today was under the direct supervision of Dr. Vallejo, who was available if needed.    Carolyn Hand RN   12/18/2024  2:01 PM

## 2024-12-20 PROBLEM — C77.5 METASTASIS TO ILIAC LYMPH NODE (H): Status: ACTIVE | Noted: 2024-12-20
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
## 2024-12-23 NOTE — TELEPHONE ENCOUNTER
RECORDS STATUS - ALL OTHER DIAGNOSIS      RECORDS RECEIVED FROM: [facility]   NOTES STATUS DETAILS   OFFICE NOTE from referring provider Epic 12/20/24: Dr. Garth Eastman   OPERATIVE REPORT Epic 12/5/24: PROSTATECTOMY   10/4/24: Prostate Bx   MEDICATION LIST [facility]    LABS     PATHOLOGY REPORTS Report in Epic 12/5/24: [id number]   10/4/24: [id number]    ANYTHING RELATED TO DIAGNOSIS Epic Most recent 12/6/24   IMAGING (NEED IMAGES & REPORT)     MRI PACS 8/8/24: MR Prostate

## 2024-12-23 NOTE — TELEPHONE ENCOUNTER
MEDICAL RECORDS REQUEST   Radiation Oncology  [address] [place] [address]  Fax: [phone]          FUTURE VISIT INFORMATION                                                   Edd Bazan, : 1955 scheduled for future visit at [facility] Radiation Oncology    RECORDS REQUESTED FOR VISIT                                                     PROSTATE     OFFICE NOTE from medical oncologist Epic 24: Dr. Pascual Whelan   OFFICE NOTE from urologist Epic 24: Dr. Garth Eastman    OPERATIVE/BIOPSY REPORTS (include if prostate was removed) Epic 24: PROSTATECTOMY   10/4/24: Prostate Bx   PSA LABS (within 5 years) Epic Most recent 24   MEDICATION LIST [facility]    LABS     PATHOLOGY REPORTS Report in Epic 24: [id number]   10/4/24: [id number]    ANYTHING RELATED TO DIAGNOSIS Epic Most recent 24    IMAGING (NEED IMAGES & REPORT)     CT SCANS     MRI PACS 24: MR Prostate   XRAYS     ULTRASOUND     PET

## 2024-12-30 ENCOUNTER — PRE VISIT (OUTPATIENT)
Dept: ONCOLOGY | Facility: CLINIC | Age: 69
End: 2024-12-30
Payer: COMMERCIAL

## 2024-12-30 ENCOUNTER — ONCOLOGY VISIT (OUTPATIENT)
Dept: ONCOLOGY | Facility: CLINIC | Age: 69
End: 2024-12-30
Attending: UROLOGY
Payer: COMMERCIAL

## 2024-12-30 ENCOUNTER — PATIENT OUTREACH (OUTPATIENT)
Dept: ONCOLOGY | Facility: CLINIC | Age: 69
End: 2024-12-30
Payer: COMMERCIAL

## 2024-12-30 VITALS
HEIGHT: 67 IN | DIASTOLIC BLOOD PRESSURE: 66 MMHG | SYSTOLIC BLOOD PRESSURE: 107 MMHG | RESPIRATION RATE: 16 BRPM | BODY MASS INDEX: 28.87 KG/M2 | OXYGEN SATURATION: 98 % | WEIGHT: 183.9 LBS | TEMPERATURE: 98.1 F | HEART RATE: 86 BPM

## 2024-12-30 DIAGNOSIS — C61 PROSTATE CANCER (H): Primary | ICD-10-CM

## 2024-12-30 DIAGNOSIS — C77.5 METASTASIS TO ILIAC LYMPH NODE (H): ICD-10-CM

## 2024-12-30 DIAGNOSIS — K40.90 LEFT INGUINAL HERNIA: ICD-10-CM

## 2024-12-30 PROCEDURE — 99205 OFFICE O/P NEW HI 60 MIN: CPT | Performed by: INTERNAL MEDICINE

## 2024-12-30 PROCEDURE — 99213 OFFICE O/P EST LOW 20 MIN: CPT | Performed by: INTERNAL MEDICINE

## 2024-12-30 ASSESSMENT — PAIN SCALES - GENERAL: PAINLEVEL_OUTOF10: NO PAIN (0)
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
## 2024-12-30 NOTE — TELEPHONE ENCOUNTER
REFERRAL INFORMATION:  Referring Provider:  Pascual Whelan MD   Referring Clinic:   ONCOLOGY ADULT   Reason for Visit/Diagnosis: K40.90 (ICD-10-CM) - Left inguinal hernia        FUTURE VISIT INFORMATION:  Appointment Date: 1/13/25  Appointment Time:      NOTES RECORD STATUS  DETAILS   OFFICE NOTE from Referring Provider Internal 12/30/24   OFFICE NOTE from Other Specialists Internal 12/5/24-Dr. Vallejo-[facility]   OPERATIVE REPORT Internal 12/5/24-PROSTATECTOMY, ROBOT-ASSISTED, WITH RIGHT PELVIC LYMPHADENECTOMY -Dr. Vallejo   PERTINENT LABS Internal    IMAGING (CT, MRI, US, XR)  Internal 8/8/24-MR Prostate     Records Requested    Facility    Fax:    Outcome

## 2024-12-30 NOTE — NURSING NOTE
"Oncology Rooming Note    December 30, 2024 8:27 AM   Edd Bazan is a 69 year old male who presents for:    Chief Complaint   Patient presents with    Oncology Clinic Visit     Prostate cancer     Initial Vitals: /66 (BP Location: Right arm, Patient Position: Sitting, Cuff Size: Adult Regular)   Pulse 86   Temp 98.1  F (36.7  C) (Oral)   Resp 12   Ht 1.705 m (5' 7.13\")   Wt 83.4 kg (183 lb 14.4 oz)   SpO2 98%   BMI 28.69 kg/m   Estimated body mass index is 28.69 kg/m  as calculated from the following:    Height as of this encounter: 1.705 m (5' 7.13\").    Weight as of this encounter: 83.4 kg (183 lb 14.4 oz). Body surface area is 1.99 meters squared.  No Pain (0) Comment: Data Unavailable   No LMP for male patient.  Allergies reviewed: Yes  Medications reviewed: Yes    Medications: Medication refills not needed today.  Pharmacy name entered into zuuka!: [id number] - [place], [place] - [address]    Frailty Screening:   Is the patient here for a new oncology consult visit in cancer care? 1. Yes. Over the past month, have you experienced difficulty or required a caregiver to assist with:   1. Balance, walking or general mobility (including any falls)? NO  2. Completion of self-care tasks such as bathing, dressing, toileting, grooming/hygiene?  NO  3. Concentration or memory that affects your daily life?  NO       Clinical concerns: none      Anamaria Kong"

## 2024-12-30 NOTE — PROGRESS NOTES
[facility]: Cancer Care Initial Note                                    Discussion with Patient:                                                      Introduced self and role of RN Care Coordinator at [facility].  Provided my contact information, [facility] phone number (which has options to talk with a Triage Nurse available 24/7 - Triage and RNCC via this option during business hours).     Reviewed current clinic flow including telemedicine visits and RNCCs working remotely at varying intervals.  Reviewed appropriate use of MyChart and reinforced to not send symptoms through MyChart.      Reviewed [facility] care team members including midlevel providers in oncology dept and Dr. Whelan's usual clinic hours.    Patient voiced understanding and appreciation of above information and denies any further questions, and patient understands that I will follow and provide coordination as needed.    Provided & reviewed patient education for ADT therapy (Leuprolide, abiraterone, prednisone, radiation therapy).     Goals          General     Other (pt-stated)      Notes - Note created  12/30/2024  9:49 AM by Veronica Melchor RN     Goal Statement: I will use my clinic and care team resources as directed.  Date Goal set: 12/30/2024  Barriers: No barriers identified  Strengths: support, coping, motivation, health awareness, and involvement with care team  Date to Achieve By: ongoing  Patient expressed understanding of goal: Yes  Action steps to achieve this goal:  Patient will contact clinic and RNCC as needed ongoing.                 Assessment:                                                      Initial  Current living arrangement:: Not Assessed  Equipment Currently Used at Home: none  Bed or wheelchair confined:: No  Mobility Status: Independent  Transportation means:: Regular car  Medication adherence problem (GOAL):: No  Knowledgeable about how to use meds:: Yes  Medication side effects  suspected:: No  Referrals Placed: None  Pain Score: No Pain (0)    Plan of Care Education Review:   Assessment completed with:: Patient    Plan of Care Education   Yearly learning assessment completed?: Yes (see Education tab)  Diagnosis:: Prostate cancer  Does patient understand diagnosis?: Yes  Tx plan/regimen:: Possible radiation & ADT  Does patient understand treatment plan/regimen?: Yes  Safety/self care at home reviewed with patient:: Yes  Coping - concerns/fears reviewed with patient:: Yes  Plan of Care:: SHARLENE follow-up appointment, Lab appointment, Imaging, MD follow-up appointment, Treatment schedule  When to call provider:: Bleeding, Increased shortness of breath, New/worsening pain, Shaking chills, Temperature >100.4F, Uncontrolled diarrhea/constipation, Uncontrolled nausea/vomiting    Evaluation of Learning  Patient Education Provided: Yes  Readiness:: Acceptance  Method:: Booklet/Handout, Literature, Explanation  Response:: Verbalizes understanding         Intervention/Education provided during outreach:                                                       Submitted [employer] requisitions.  Provided patient Inv[employer] instructions.    Patient to follow up as scheduled at next appt  Patient to call/ISORGhart message with updates  Confirmed patient has clinic and triage numbers    Veronica OSEGUERA RN  Cancer Care Coordinator  [facility]

## 2025-01-10 ENCOUNTER — HOSPITAL ENCOUNTER (OUTPATIENT)
Dept: NUCLEAR MEDICINE | Facility: CLINIC | Age: 70
Setting detail: NUCLEAR MEDICINE
Discharge: HOME | End: 2025-01-10
Attending: INTERNAL MEDICINE
Payer: COMMERCIAL

## 2025-01-10 ENCOUNTER — HOSPITAL ENCOUNTER (OUTPATIENT)
Dept: CT IMAGING | Facility: CLINIC | Age: 70
Setting detail: NUCLEAR MEDICINE
Discharge: HOME | End: 2025-01-10
Attending: INTERNAL MEDICINE
Payer: COMMERCIAL

## 2025-01-10 DIAGNOSIS — C61 PROSTATE CANCER (H): ICD-10-CM

## 2025-01-10 DIAGNOSIS — C77.5 METASTASIS TO ILIAC LYMPH NODE (H): ICD-10-CM

## 2025-01-10 PROCEDURE — 78306 BONE IMAGING WHOLE BODY: CPT

## 2025-01-10 PROCEDURE — 74177 CT ABD & PELVIS W/CONTRAST: CPT | Mod: 26 | Performed by: RADIOLOGY

## 2025-01-10 PROCEDURE — 250N000011 HC RX IP 250 OP 636: Performed by: INTERNAL MEDICINE

## 2025-01-10 PROCEDURE — 71260 CT THORAX DX C+: CPT

## 2025-01-10 PROCEDURE — 74177 CT ABD & PELVIS W/CONTRAST: CPT

## 2025-01-10 PROCEDURE — 343N000001 HC RX 343 MED OP 636: Performed by: INTERNAL MEDICINE

## 2025-01-10 PROCEDURE — 78306 BONE IMAGING WHOLE BODY: CPT | Mod: 26 | Performed by: RADIOLOGY

## 2025-01-10 PROCEDURE — 71260 CT THORAX DX C+: CPT | Mod: 26 | Performed by: RADIOLOGY

## 2025-01-10 PROCEDURE — A9503 TC99M MEDRONATE: HCPCS | Performed by: INTERNAL MEDICINE

## 2025-01-10 RX ORDER — TC 99M MEDRONATE 20 MG/10ML
20-30 INJECTION, POWDER, LYOPHILIZED, FOR SOLUTION INTRAVENOUS ONCE
Status: COMPLETED | OUTPATIENT
Start: 2025-01-10 | End: 2025-01-10

## 2025-01-10 RX ORDER — IOPAMIDOL 755 MG/ML
112 INJECTION, SOLUTION INTRAVASCULAR ONCE
Status: COMPLETED | OUTPATIENT
Start: 2025-01-10 | End: 2025-01-10

## 2025-01-10 RX ADMIN — TC 99M MEDRONATE 24.3 MILLICURIE: 20 INJECTION, POWDER, LYOPHILIZED, FOR SOLUTION INTRAVENOUS at 10:51

## 2025-01-10 RX ADMIN — IOPAMIDOL 112 ML: 755 INJECTION, SOLUTION INTRAVENOUS at 11:12

## 2025-01-13 ENCOUNTER — PRE VISIT (OUTPATIENT)
Dept: SURGERY | Facility: CLINIC | Age: 70
End: 2025-01-13

## 2025-01-14 ENCOUNTER — OFFICE VISIT (OUTPATIENT)
Dept: SURGERY | Facility: CLINIC | Age: 70
End: 2025-01-14
Attending: INTERNAL MEDICINE
Payer: COMMERCIAL

## 2025-01-14 VITALS
WEIGHT: 192.1 LBS | OXYGEN SATURATION: 99 % | HEIGHT: 67 IN | BODY MASS INDEX: 30.15 KG/M2 | HEART RATE: 92 BPM | SYSTOLIC BLOOD PRESSURE: 106 MMHG | DIASTOLIC BLOOD PRESSURE: 71 MMHG

## 2025-01-14 DIAGNOSIS — K40.90 LEFT INGUINAL HERNIA: ICD-10-CM

## 2025-01-14 LAB — SPECIMEN STATUS: NORMAL

## 2025-01-14 PROCEDURE — 99203 OFFICE O/P NEW LOW 30 MIN: CPT | Mod: 24 | Performed by: SURGERY

## 2025-01-14 RX ORDER — CEFAZOLIN SODIUM 2 G/50ML
2 SOLUTION INTRAVENOUS
OUTPATIENT
Start: 2025-01-14

## 2025-01-14 RX ORDER — CEFAZOLIN SODIUM 2 G/50ML
2 SOLUTION INTRAVENOUS SEE ADMIN INSTRUCTIONS
OUTPATIENT
Start: 2025-01-14

## 2025-01-14 ASSESSMENT — PAIN SCALES - GENERAL: PAINLEVEL_OUTOF10: NO PAIN (0)

## 2025-01-14 NOTE — PROGRESS NOTES
Edd Bazan is a 69 year old male with a many year history of a left inguinal mass with the following symptoms of lump.      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    Past medical and surgical history, medications, allergies, family history, and social history were reviewed with the patient.  Past Medical History:   Diagnosis Date    Hypertension     iamAFT CARE HEAL TRAUM FRAC LOWER LEG 11/8/2005    Rectal fistulas     with steton, see outside report     Past Surgical History:   Procedure Laterality Date    BONY PELVIS SURGERY      COLONOSCOPY N/A 8/9/2021    Procedure: COLONOSCOPY;  Surgeon: Eugene Jeronimo MD;  Location:  GI    DAVINCI PROSTATECTOMY, LYMPHADENECTOMY N/A 12/5/2024    Procedure: PROSTATECTOMY, ROBOT-ASSISTED, WITH RIGHT PELVIC LYMPHADENECTOMY;  Surgeon: Garth Vallejo MD;  Location:  OR     ROS: 10 point review of systems negative except noted in HPI  PHYSICAL EXAM  General appearance- alert, and in no distress.  Lungs- Respiratory effort unlabored.  Gait- Normal.  Abdomen - soft non distended, non tender well healed surgical scars.  [facility]    Impression:  Inguinal hernia, left, need for repair soon so that radiation therapy can commence. Will schedule next available.  Recommendation:  Left Inguinal Hernioplasty open mesh repair.    A full discussion regarding the alternatives, risks, goals, and potential complications for this surgery was completed today.  The patient understood I would use non recalled mesh and  that the potential problems included but are not limited to:  Infection, bleeding, hematoma, seroma, recurrence, injury to nerve/muscle or involved testicle(if male), ischemic orchitis(if male), and chronic pain.    The patient verbally expressed understanding, was given the opportunity for questions, and gives full informed consent  for the procedure.      Today the patient was instructed on the need for a preoperative H&P, NPO status prior to surgery, and the need to stop anticoagulants prior to surgery.  Additional educational material, soap, and instructions will be mailed out to the patients home.    Plan PAC pre op.    The total time spent with this patient was 30 minutes.  The total time was spent on the date of encounter doing chart review, history and physical, documentation, patient education, and any further activity as noted above.   .

## 2025-01-14 NOTE — NURSING NOTE
"Chief Complaint   Patient presents with    New Patient     Left inguinal hernia       Vitals:    01/14/25 0856   BP: 106/71   BP Location: Left arm   Patient Position: Sitting   Cuff Size: Adult Regular   Pulse: 92   SpO2: 99%   Weight: 87.1 kg (192 lb 1.6 oz)   Height: 1.702 m (5' 7\")       Body mass index is 30.09 kg/m .                          Kirby Guzmán EMT    "

## 2025-01-14 NOTE — PATIENT INSTRUCTIONS
You met with Dr. Seng Garcia.      Today's visit instructions:    Reminder: Surgery Requirements  Your surgery will be at [facility] [place]- 5th Floor.  You will need to arrive 1.5  hours early.  You will need someone to drive you home (over 18 years old) and stay with you for 24 hours after the procedure.  You will need a preop physical with [facility] ([facility]) within 30 days of surgery- closer is always better.  Stop any blood thinners, vitamins, minerals, or herbal supplements 5 days before surgery.  If you are taking a prescribed blood thinner or weight loss medication please let us know for specific instructions.  Fasting- a nurse from [facility] will call you 1-2 days before surgery to confirm your procedure and tell you when to stop eating and drinking. If you had you preoperative physical with the Anesthesia Team/PAC, you do not get this call as it is discussed at the time of your visit.  Wash with the soap given/mailed from the clinic the night before surgery and morning of surgery. See instructions in the Surgery Packet.  If you would like a procedure estimate please call Cost of Care at [phone] during the hours of 8 AM - 3 PM (option #2 for on-line request and option #3 for a representative).   Billing Customer Service:  [phone]     If you have questions please contact Katherin RN or Meghna RN during regular clinic hours, Monday through Friday 7:30 AM - 4:00 PM, or you can contact us via MobilyTrip at anytime.       If you have urgent needs after-hours, weekends, or holidays please call the hospital at [phone] and ask to speak with our on-call General Surgery Team.    Appointment [phone]  Nurse Advice (Katherin or Meghna): [phone]   Surgery Scheduler (Carly): [phone]  Billing Customer Service:  [phone]  Fax: [phone]    After Your Open Inguinal Hernia Repair         Incision care   You may take a shower the day  after surgery. Carefully wash your incision with soap and water. Do not submerge yourself in water (bath, whirlpool, hot tub, pool, lake) for 14 days after surgery.   Remove the bandage 1-2 days after surgery but leave the medical tape (Steri-Strips) or glue in place. These will loosen and fall off on their own 1-2 weeks after surgery.    Always wash your hands before touching your incisions or removing bandages.   It is not unusual to form a collection of fluid or blood under your incision that may feel firm or squishy- it can take several weeks to months for your body to reabsorb it.  At times, it may even drain.  If that should happen keep the area clean with soap, water,  and cover with a clean gauze dressing. You can change this daily or as needed.  It is not unusual to develop swelling and/or bruising of the scrotum and penis and it can take several weeks or a month to improve.      Other medicines   Wait to start aspirin or blood thinners until the day after surgery. You can take any other regular medicines at your normal time the day after surgery.   Your pain medicine may cause constipation (hard, dry stools). To help with this, take the stool softener your doctor gave you or an over-the-counter stool softener or laxative. You can stop taking this when you are no longer taking pain medicine and your bowel movements are back to normal.      For pain or discomfort   Take the narcotic pain medicine your doctor gave you as needed and as instructed on the bottle. If you prefer to use over-the-counter medication, use acetaminophen (Tylenol) or ibuprofen (Advil, Motrin) as instructed on the box. Do not take Tylenol if it is in your narcotic pain medication.    Use an ice pack on your groin for 20 minutes at a time as needed for the first 24 hours. Be sure to protect your skin by putting a cloth between the ice pack and your skin.   After 24 hours you can switch to heat for 20 minutes as needed. Be sure to protect  your skin by putting a cloth between the heat pack and your skin.    It is normal to feel a lump in your groin after surgery. This lump may take up to 8 weeks to go away.      Activities   No driving until you feel it s safe to do so. Don t drive while taking narcotic pain medicine.   Don t lift anything heavier than 20 pounds for 3 weeks after surgery.      Special equipment   Male Patients:  We recommend that you wear scrotal support for the first 3 days after surgery; however, you can wear it longer if you wish.  We suggest using an athletic supporter (Jock Strap), snug briefs, or Spandex biker shorts.     Diet   You can eat your regular meals after surgery.     Dental Care  Please avoid dental care for two weeks prior to hernia repair and for 6 weeks after surgery due to the mesh that may be placed.     When to call the doctor   Call your doctor if you have:   A fever above 101 F (38.3 C) (taken under the tongue), or a fever or chills lasting more than a day.   Redness at the incision site.   Any fluid or blood draining from the incision, especially if it smells bad.    Severe pain that doesn t improve with pain medicine.      We will call you 2 to 4 days after surgery to review this handout, answer questions and help arrange after-surgery care. If you have questions or concerns, please call [phone] during regular office hours. If you need to call after business hours, call [phone] and ask to page the surgeon on-call.     IMPORTANT:  Prior to your surgical procedure, a nurse will be contacting you to obtain a health history.   If they do not reach you by noon the day prior to your surgery, your surgery will be cancelled. If you have your Pre-Op Surgical Physical (H&P) with the Anesthesia Team (PAC), you are exempt from this call. Phone:  [phone] ([facility]) or [phone] ([place]).

## 2025-01-14 NOTE — NURSING NOTE
Pre and Post op Patient Education/Teaching Flowsheet  Relevant Diagnosis:  Inguinal Hernia (K40.90)  Teaching Topic:  Pre and post op teaching  Person(s) Involved in teaching:  Patient     Motivation Level:  Asks Questions:  Yes  Eager to Learn:  Yes  Cooperative:  Yes  Receptive (willing/able to accept information):  Yes  Any cultural factors/Mandaen beliefs that may influence understanding or compliance?  No    Patient/caregiver/family demonstrates understanding of the following:  Reason for the appointment, diagnosis, and treatment plan:  Yes  Patient demonstrates understanding of the following:  Pre-op bowel prep:  N/A  Post-op pain management recommendations (medications, ice compress, binder/athletic supporter (if applicable), etc.:  Yes  Inguinal hernia patients:  Post-op urinary retention- discussed signs/symptoms and visit to ER for Adame catheter placement and to stay in place for at least 48 hours:  NA  Restrictions:  Yes  Medications to take the day of surgery:  Per PCP  Blood thinner medications discussed and when to stop (if applicable):  Yes  Wound care:  Yes  Diabetes medication management (if applicable):  Per PCP  Which situations necessitate calling provider and whom to contact:  Discussed how to contact the hospital, nurse, and clinic scheduling staff if necessary    Infection Prevention: Patient demonstrates understanding of the following:  Patient instructed on hand hygiene:  Yes  Surgical procedure site care will be taught and will be reviewed at the time of discharge  Signs and symptoms of infection taught:  Yes  Wound care reviewed and will be taught at the time of discharge  Central venous catheter care will be taught at the time of discharge (if applicable)    Post-op follow-up:  Instructional materials used/given/mailed:  Surgical logistics, post op teaching sheet, and surgical scrub    Logistics:  Date and time of surgery:  TBD  Location of surgery: [facility]- 5th Floor  History and Physical and any other testing necessary prior to surgery:  Yes  Required time line for completion of History and Physical and any pre-op testing:  Yes  Discuss need for someone to drive patient home and stay with them for 24 hours:  Yes  Pre-op showering/scrub information with Surgical Scrub:  Yes  NPO Guidelines:  NPO per Anesthesia Guidelines

## 2025-01-14 NOTE — LETTER
1/14/2025       RE: Edd Bazan  [address]  [place] [address]     Dear Colleague,    Thank you for referring your patient, Edd Bazan, to the [facility] [place] [facility]. Please see a copy of my visit note below.    Edd Bazan is a 69 year old male with a many year history of a left inguinal mass with the following symptoms of lump.      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    Past medical and surgical history, medications, allergies, family history, and social history were reviewed with the patient.  Past Medical History:   Diagnosis Date     Hypertension      iamAFT CARE HEAL TRAUM FRAC LOWER LEG 11/8/2005     Rectal fistulas     with steton, see outside report     Past Surgical History:   Procedure Laterality Date     BONY PELVIS SURGERY       COLONOSCOPY N/A 8/9/2021    Procedure: COLONOSCOPY;  Surgeon: Eugene Jeronimo MD;  Location:  GI     DAVINCI PROSTATECTOMY, LYMPHADENECTOMY N/A 12/5/2024    Procedure: PROSTATECTOMY, ROBOT-ASSISTED, WITH RIGHT PELVIC LYMPHADENECTOMY;  Surgeon: Garth Vallejo MD;  Location:  OR     ROS: 10 point review of systems negative except noted in HPI  PHYSICAL EXAM  General appearance- alert, and in no distress.  Lungs- Respiratory effort unlabored.  Gait- Normal.  Abdomen - soft non distended, non tender well healed surgical scars.  [facility]    Impression:  Inguinal hernia, left, need for repair soon so that radiation therapy can commence. Will schedule next available.  Recommendation:  Left Inguinal Hernioplasty open mesh repair.    A full discussion regarding the alternatives, risks, goals, and potential complications for this surgery was completed today.  The patient understood I would use non recalled  mesh and  that the potential problems included but are not limited to:  Infection, bleeding, hematoma, seroma, recurrence, injury to nerve/muscle or involved testicle(if male), ischemic orchitis(if male), and chronic pain.    The patient verbally expressed understanding, was given the opportunity for questions, and gives full informed consent for the procedure.      Today the patient was instructed on the need for a preoperative H&P, NPO status prior to surgery, and the need to stop anticoagulants prior to surgery.  Additional educational material, soap, and instructions will be mailed out to the patients home.    Plan PAC pre op.    The total time spent with this patient was 30 minutes.  The total time was spent on the date of encounter doing chart review, history and physical, documentation, patient education, and any further activity as noted above.   .             Again, thank you for allowing me to participate in the care of your patient.      Sincerely,    Seng Garcia MD

## 2025-01-16 ENCOUNTER — TELEPHONE (OUTPATIENT)
Dept: SURGERY | Facility: CLINIC | Age: 70
End: 2025-01-16
Payer: COMMERCIAL

## 2025-01-16 PROBLEM — K40.90 LEFT INGUINAL HERNIA: Status: ACTIVE | Noted: 2025-01-14

## 2025-01-16 NOTE — TELEPHONE ENCOUNTER
Called and LVM for patient to schedule surgery with Dr. Garcia. Provided direct call back number [phone].      Antionette William on 1/16/2025 at 9:18 AM

## 2025-01-16 NOTE — TELEPHONE ENCOUNTER
Called patient to schedule surgery with Dr. Garcia    Spoke with:  Patient    Date of Surgery: 1/31/25    Arrival time Discussed with Patient:  No    Location of surgery: [facility]     Pre-Op H&P: With PAC on 1/27/25    Post-Op Appts: Protocol      Discussed with patient PAC RN will provide arrival time and instructions for surgery at the time of the appointment: [[facility] locations only]: Yes    Packet sent out: Yes  via Mail - Standard [DATE] 1/16/25      Additional Comments:  NA      All patients questions were answered and patient was instructed to review surgical packet and call back with any questions or concerns.       Antionette William on 1/16/2025 at 2:17 PM

## 2025-01-21 NOTE — TELEPHONE ENCOUNTER
FUTURE VISIT INFORMATION      SURGERY INFORMATION:  Date: 1/31/25  [facility]  Surgeon:  Seng Garcia MD   Anesthesia Type:  MAC with Local  Procedure: HERNIORRHAPHY, INGUINAL, OPEN   Consult: ov 1/14/25    RECORDS REQUESTED FROM:       Primary Care Provider: [facility]    Pertinent Medical History: hypertension

## 2025-01-23 ENCOUNTER — PATIENT OUTREACH (OUTPATIENT)
Dept: ONCOLOGY | Facility: CLINIC | Age: 70
End: 2025-01-23
Payer: COMMERCIAL

## 2025-01-23 NOTE — PROGRESS NOTES
[facility]: Cancer Care                                                                                          Called patient to inform [employer] testing will need to be repeated; informed new kit will be sent to patient's home.  Patient verbalized understanding.    Veronica OSEGUERA RN  Cancer Care Coordinator  [facility]

## 2025-01-27 ENCOUNTER — OFFICE VISIT (OUTPATIENT)
Dept: SURGERY | Facility: CLINIC | Age: 70
End: 2025-01-27
Payer: COMMERCIAL

## 2025-01-27 ENCOUNTER — PRE VISIT (OUTPATIENT)
Dept: SURGERY | Facility: CLINIC | Age: 70
End: 2025-01-27

## 2025-01-27 ENCOUNTER — TELEPHONE (OUTPATIENT)
Dept: SURGERY | Facility: CLINIC | Age: 70
End: 2025-01-27

## 2025-01-27 DIAGNOSIS — K40.90 LEFT INGUINAL HERNIA: ICD-10-CM

## 2025-01-27 DIAGNOSIS — Z01.818 PREOP EXAMINATION: Primary | ICD-10-CM

## 2025-01-27 PROCEDURE — 99207 PR NO CHARGE LOS: CPT | Performed by: PHYSICIAN ASSISTANT

## 2025-01-27 ASSESSMENT — ENCOUNTER SYMPTOMS: SEIZURES: 0

## 2025-01-27 ASSESSMENT — LIFESTYLE VARIABLES: TOBACCO_USE: 1

## 2025-01-27 NOTE — H&P
ERRONEOUS ENCOUNTER - No show      Patient is being evaluated for comorbid conditions of HTN, HLD, tobacco use, prostate cancer.    Mr. Bazan has a history of a left inguinal hernia with symptoms of a lump and urinary difficulties. He denies obstructive symptoms, chronic cough, constipation.  He recently underwent a robot-assisted prostatectomy on 12/25/24. He has been referred for the above hernia repair so that radiation therapy can commence. He now presents for the above procedure.      Anesthesia Evaluation   Pt has had prior anesthetic.     No history of anesthetic complications       ROS/MED HX  ENT/Pulmonary:     (+)     NICK risk factors,  hypertension,         tobacco use, Current use,                    (-) asthma   Neurologic:  - neg neurologic ROS  (-) no seizures and no CVA   Cardiovascular:     (+) Dyslipidemia hypertension- -   -  - -                                      METS/Exercise Tolerance: >4 METS    Hematologic:  - neg hematologic  ROS  (-) history of blood clots and history of blood transfusion   Musculoskeletal:  - neg musculoskeletal ROS     GI/Hepatic: Comment: Left inguinal hernia     (-) liver disease   Renal/Genitourinary:  - neg Renal ROS     Endo:  - neg endo ROS     Psychiatric/Substance Use:  - neg psychiatric ROS     Infectious Disease:  - neg infectious disease ROS     Malignancy:   (+) Malignancy, History of Prostate.Prostate CA Active status post Surgery.      Other:

## 2025-01-27 NOTE — TELEPHONE ENCOUNTER
Called patient regarding missed pre-op physical. Patient said he forgot he had an appointment this morning. Rescheduled pre-op to be on 1/30/25 at 7:15 AM. Patient agreed to new appointment date/time.    No further questions/concerns at this time.    Antionette William on 1/27/2025 at 2:08 PM

## 2025-01-30 ENCOUNTER — PRE VISIT (OUTPATIENT)
Dept: SURGERY | Facility: CLINIC | Age: 70
End: 2025-01-30

## 2025-01-30 ENCOUNTER — OFFICE VISIT (OUTPATIENT)
Dept: SURGERY | Facility: CLINIC | Age: 70
End: 2025-01-30
Payer: COMMERCIAL

## 2025-01-30 ENCOUNTER — PRE VISIT (OUTPATIENT)
Dept: RADIATION ONCOLOGY | Facility: CLINIC | Age: 70
End: 2025-01-30
Payer: COMMERCIAL

## 2025-01-30 VITALS
HEART RATE: 75 BPM | RESPIRATION RATE: 16 BRPM | WEIGHT: 192 LBS | HEIGHT: 67 IN | BODY MASS INDEX: 30.13 KG/M2 | DIASTOLIC BLOOD PRESSURE: 69 MMHG | TEMPERATURE: 97.6 F | OXYGEN SATURATION: 99 % | SYSTOLIC BLOOD PRESSURE: 102 MMHG

## 2025-01-30 DIAGNOSIS — Z01.818 PRE-OP EVALUATION: Primary | ICD-10-CM

## 2025-01-30 DIAGNOSIS — K40.90 LEFT INGUINAL HERNIA: ICD-10-CM

## 2025-01-30 ASSESSMENT — LIFESTYLE VARIABLES: TOBACCO_USE: 1

## 2025-01-30 ASSESSMENT — ENCOUNTER SYMPTOMS: SEIZURES: 0

## 2025-01-30 ASSESSMENT — PAIN SCALES - GENERAL: PAINLEVEL_OUTOF10: NO PAIN (0)

## 2025-01-30 NOTE — PATIENT INSTRUCTIONS
Preparing for Your Surgery      Name:  dEd Bazan   MRN:  [id number]   :  1955   Today's Date:  2025         Arriving for surgery:  Surgery date:  25  Arrival time:  9.45AM    Restrictions due to COVID 19:    Please maintain social distance.  Masking is optional.      parking is available for anyone with mobility limitations or disabilities. (Monday- Friday 7 am- 5 pm)    Please come to:    [facility]  [address] [place] [id number]    Please check in on the 5th floor at the Ambulatory Surgery Center.      What can I eat or drink?    -  You may eat and drink normally until 8 hours prior to arrival  time. (Until 1.45AM)  -  You may have clear liquids until 2 hours prior to arrival  time. (Until 7.45AM)    Examples of clear liquids:  Water  Clear broth  Juices (apple, white grape, white cranberry  and cider) without pulp  Noncarbonated, powder based beverages  (lemonade and Chidi-Aid)  Sodas (Sprite, 7-Up, ginger ale and seltzer)  Coffee or tea (without milk or cream)  Gatorade      Which medicines can I take?    Hold Aspirin for 7 days before surgery.   Hold Multivitamins for 7 days before surgery.  Hold Supplements for 7 days before surgery.  Hold Ibuprofen (Advil, Motrin) for 1 day before surgery--unless otherwise directed by surgeon.  Hold Naproxen (Aleve) for 4 days before surgery.    No alcohol or cannabis products for 24 hours prior to procedure.      -  DO NOT take the following medications the day of surgery:  Docusate calcium (stool softener)  Lisinopril-hydrochlorothiazide (Zestoretic)  Senna   Tadalafil (Cialis)      -  PLEASE TAKE the following medications the day of surgery:   Atorvastatin (Lipitor)      How do I prepare myself?  - Please take 2 showers (one the night prior to surgery and one the morning of surgery) using Scrubcare or Hibiclens soap.    Use this soap only from the neck to your toes. Avoid genital area      Leave the soap  on your skin for one minute--then rinse thoroughly.      You may use your own shampoo and conditioner. No other hair products.   - Please remove all jewelry and body piercings.  - No lotions, deodorants or fragrance.  - No makeup or fingernail polish.   - Bring your ID and insurance card.    -If you have a Deep Brain Stimulator, a Spinal Cord Stimulator, or any implanted Neuro Device, you must bring the remote to the Surgery Center.         ALL PATIENTS ARE REQUIRED TO HAVE A RESPONSIBLE ADULT TO DRIVE AND BE IN ATTENDANCE WITH THEM FOR 24 HOURS FOLLOWING SURGERY.     Covid testing policy as of 12/06/2022  Your surgeon will notify and schedule you for a COVID test if one is needed before surgery--please direct any questions or COVID symptoms to your surgeon      Questions or Concerns:    -For questions regarding the day of surgery, please contact the Ambulatory Surgery Center at [phone].    -If you have health changes between today and your surgery, please contact your surgeon.     - For questions after surgery, please contact your surgeon's office.

## 2025-01-30 NOTE — H&P
Pre-Operative H & P     CC:  Preoperative exam to assess for increased cardiopulmonary risk while undergoing surgery and anesthesia.    Date of Encounter: 1/30/2025  Primary Care Physician:  No Ref-Primary, Physician     Reason for visit:   Encounter Diagnoses   Name Primary?    Pre-op evaluation Yes    Left inguinal hernia        ANDREINA Bazan is a 69 year old male who presents for pre-operative H & P in preparation for  Procedure Information       Case: [id number] Date/Time: 01/31/25 1115    Procedure: HERNIORRHAPHY, INGUINAL, OPEN (Left: Groin)    Anesthesia type: MAC with Local    Diagnosis: Left inguinal hernia [K40.90]    Pre-op diagnosis: Left inguinal hernia [K40.90]    Location: [place] [address] / [facility]    Providers: Seng aGrcia MD            Patient is being evaluated for comorbid conditions of HTN, tobacco use, history of prostate cancer s/p prostatectomy, rectal fistula, and history of TBI.    Patient has a history of a left inguinal hernia with symptoms of a lump and urinary difficulties. He denies obstructive symptoms, chronic cough, constipation.  He recently underwent a robot-assisted prostatectomy on 12/25/24. He has been referred for the above hernia repair so that radiation therapy can commence. He now presents for the above procedure.     History is obtained from the patient and chart review    Hx of abnormal bleeding or anti-platelet use: ASA 81 mg      Past Medical History  Past Medical History:   Diagnosis Date    History of traumatic brain injury     Hypertension     [facility] HEAL TRAUM FRAC LOWER LEG 11/08/2005    Left inguinal hernia 01/2025    Prostate cancer (H) 12/2024    Rectal fistulas     with steton, see outside report    Tobacco use        Past Surgical History  Past Surgical History:   Procedure Laterality Date    BONY PELVIS SURGERY      COLONOSCOPY N/A 8/9/2021    Procedure: COLONOSCOPY;  Surgeon: Kandace  Eugene Benoit MD;  Location:  GI    DAVINCI PROSTATECTOMY, LYMPHADENECTOMY N/A 12/5/2024    Procedure: PROSTATECTOMY, ROBOT-ASSISTED, WITH RIGHT PELVIC LYMPHADENECTOMY;  Surgeon: Garth Vlalejo MD;  Location:  OR       Prior to Admission Medications  Current Outpatient Medications   Medication Sig Dispense Refill    aspirin 81 MG EC tablet Take 1 tablet (81 mg) by mouth daily (Patient taking differently: Take 81 mg by mouth every morning.) 90 tablet 3    atorvastatin (LIPITOR) 10 MG tablet Take 1 tablet (10 mg) by mouth daily (Patient taking differently: Take 10 mg by mouth every morning.) 90 tablet 3    Docusate Calcium (STOOL SOFTENER PO) Take by mouth as needed.      ELDERBERRY PO Take 1 capsule by mouth every evening.      lisinopril-hydrochlorothiazide (ZESTORETIC) 20-25 MG tablet Take 1 tablet by mouth daily (Patient taking differently: Take 1 tablet by mouth every morning.) 90 tablet 3    senna-docusate (SENOKOT-S/PERICOLACE) 8.6-50 MG tablet Take 1 tablet by mouth daily. 30 tablet 0    tadalafil (CIALIS) 5 MG tablet Take 1 tablet (5 mg) by mouth every 24 hours. (Patient taking differently: Take 5 mg by mouth every morning.) 30 tablet 3    COMPLETE MULTI-PURPOSE 1 daily (Patient not taking: Reported on 1/30/2025)      FIBER PO Take  by mouth. (Patient not taking: Reported on 1/30/2025)      MAGNESIUM PO  (Patient not taking: Reported on 1/30/2025)      Omega-3 Fatty Acids (FISH OIL PO) Take  by mouth. (Patient not taking: Reported on 1/30/2025)      VITAMIN D 39357 UNIT OR CAPS every week on friday (Patient not taking: Reported on 1/30/2025)      VITAMIN E PO  (Patient not taking: Reported on 1/30/2025)         Allergies  Allergies   Allergen Reactions    No Known Drug Allergy        Social History  Social History     Socioeconomic History    Marital status:      Spouse name: passed away 8/2009    Number of children: Not on file    Years of education: Not on file    Highest education level: Not on  file   Occupational History     Employer: [employer]     Comment: [employer]   Tobacco Use    Smoking status: Some Days     Types: Cigars     Passive exposure: Never    Smokeless tobacco: Never    Tobacco comments:     occ   Vaping Use    Vaping status: Never Used   Substance and Sexual Activity    Alcohol use: Yes     Comment: a few times a week    Drug use: No    Sexual activity: Not Currently     Partners: Female   Other Topics Concern    Parent/sibling w/ CABG, MI or angioplasty before 65F 55M? No   Social History Narrative    Not on file     Social Drivers of Health     Financial Resource Strain: Low Risk  (7/1/2024)    Financial Resource Strain     Within the past 12 months, have you or your family members you live with been unable to get utilities (heat, electricity) when it was really needed?: No   Food Insecurity: Low Risk  (7/1/2024)    Food Insecurity     Within the past 12 months, did you worry that your food would run out before you got money to buy more?: No     Within the past 12 months, did the food you bought just not last and you didn t have money to get more?: No   Transportation Needs: Low Risk  (7/1/2024)    Transportation Needs     Within the past 12 months, has lack of transportation kept you from medical appointments, getting your medicines, non-medical meetings or appointments, work, or from getting things that you need?: No   Physical Activity: Unknown (7/1/2024)    Exercise Vital Sign     Days of Exercise per Week: 4 days     Minutes of Exercise per Session: Not on file   Stress: No Stress Concern Present (7/1/2024)    Citizen of [place] [employer] Occupational [employer] - Occupational Stress Questionnaire     Feeling of Stress : Not at all   Social Connections: Unknown (7/1/2024)    Social Connection and Isolation Panel [NHANES]     Frequency of Communication with Friends and Family: Not on file     Frequency of Social Gatherings with Friends and Family: Once a week     Attends Jewish  Services: Not on file     Active Member of Clubs or Organizations: Not on file     Attends Club or Organization Meetings: Not on file     Marital Status: Not on file   Interpersonal Safety: Low Risk  (12/5/2024)    Interpersonal Safety     Do you feel physically and emotionally safe where you currently live?: Yes     Within the past 12 months, have you been hit, slapped, kicked or otherwise physically hurt by someone?: No     Within the past 12 months, have you been humiliated or emotionally abused in other ways by your partner or ex-partner?: No   Housing Stability: High Risk (7/1/2024)    Housing Stability     Do you have housing? : No     Are you worried about losing your housing?: No       Family History  Family History   Problem Relation Age of Onset    Breast Cancer Mother     Heart Disease Father     Anesthesia Reaction No family hx of     Venous thrombosis No family hx of        Review of Systems  The complete review of systems is negative other than noted in the HPI or here.   Anesthesia Evaluation   Pt has had prior anesthetic. Type: General.    No history of anesthetic complications       ROS/MED HX  ENT/Pulmonary:     (+)     NICK risk factors,  hypertension,         tobacco use, Current use,                    (-) asthma and recent URI   Neurologic: Comment: History of TBI   (-) no seizures, no CVA and no TIA   Cardiovascular:     (+)  hypertension- -   -  - -                                 No previous cardiac testing     METS/Exercise Tolerance: >4 METS Comment: Walks to work every day - 3 blocks one direction. Works in the catering business, active job.    Hematologic:  - neg hematologic  ROS     Musculoskeletal:  - neg musculoskeletal ROS     GI/Hepatic: Comment: Left inguinal hernia    Rectal fistula   (-) GERD and liver disease   Renal/Genitourinary:  - neg Renal ROS     Endo:     (+)               Obesity,    (-) Type II DM and thyroid disease   Psychiatric/Substance Use:  - neg psychiatric ROS    "  Infectious Disease:  - neg infectious disease ROS     Malignancy:   (+) Malignancy, History of Prostate.Prostate CA Active status post Surgery.      Other:  - neg other ROS          /69 (BP Location: Right arm, Patient Position: Sitting, Cuff Size: Adult Large)   Pulse 75   Temp 97.6  F (36.4  C) (Oral)   Resp 16   Ht 1.702 m (5' 7\")   Wt 87.1 kg (192 lb)   SpO2 99%   BMI 30.07 kg/m      Physical Exam   Constitutional: Pleasant, well-appearing, no apparent distress, and appears stated age.  Eyes: Pupils equal, round and reactive to light, extra ocular muscles intact, sclera clear, conjunctiva normal.  HENT: Normocephalic and atraumatic, oral pharynx with moist mucus membranes, edentulous. No goiter appreciated.   Respiratory: Clear to auscultation bilaterally, no crackles or wheezing.  Cardiovascular: Regular rate and rhythm, normal S1 and S2, and no murmur noted.  Carotids +2, no bruits. No edema. Palpable pulses to radial  DP and PT arteries.   GI: Normal bowel sounds, soft, non-distended, non-tender, no masses palpated, no hepatosplenomegaly.  Lymph/Hematologic: No cervical lymphadenopathy and no supraclavicular lymphadenopathy.  Genitourinary:  Deferred  Skin: Warm and dry.  No rashes on exposed skin   Musculoskeletal: Full ROM of neck. There is no redness, warmth, or swelling of the visible joints. Gross motor strength is normal.    Neurologic: Awake, alert, oriented to name, place and time. Cranial nerves II-XII are grossly intact. Gait is normal.   Neuropsychiatric: Calm, cooperative. Normal affect.       Prior Labs/Diagnostic Studies   All labs and imaging personally reviewed     EKG/ stress test - if available please see in ROS above   No results found.    The patient's records and results personally reviewed by this provider.     Outside records reviewed from: Care Everywhere    LAB/DIAGNOSTIC STUDIES TODAY:  None    Assessment  Edd Bazan is a 69 year old male seen as a PAC referral " for risk assessment and optimization for anesthesia.    Plan/Recommendations  Pt will be optimized for the proposed procedure.  See below for details on the assessment, risk, and preoperative recommendations    NEUROLOGY  - No history of TIA, CVA or seizure  - History of TBI (2008)    -Post Op delirium risk factors:  No risk identified    ENT  - No current airway concerns.  Will need to be reassessed day of surgery.  Mallampati: II  TM: > 3    CARDIAC  - No history of CAD and Afib  - Hypertension  Well controlled  After discussion with attending anesthesiologist, Dr. Stephenson, will have patient hold lisinopri-hydrochlorothiazide day of surgery due to BP range 100-110/70.    - METS (Metabolic Equivalents)  Patient performs 4 or more METS exercise without symptoms             Total Score: 0      RCRI-Very low risk: Class 1 0.4% complication rate             Total Score: 0        PULMONARY    NICK Medium Risk             Total Score: 3    NICK: Hypertension    NICK: Over 50 ys old    NICK: Male      - Denies asthma or inhaler use  - Tobacco History    History   Smoking Status    Some Days    Types: Cigars   Smokeless Tobacco    Never       GI  - Left inguinal hernia  Above surgery planned for further management  - History of rectal fistula    PONV Low Risk  Total Score: 1           1 AN PONV: Intended Post Op Opioids        /RENAL  - Prostate cancer  S/p prostatectomy 12/2024  Planning for radiation following inguinal hernia repair  Patient is on daily Cialis for penile rehab per urology, he last took this medication this morning. Due to time sensitive nature of surgery, this was discussed with Dr. Stephenson, and recommend that patient hold this medication tomorrow and proceed with surgery as scheduled. Dr. Stephenson will notify the [facility] about this recommendation.   - Baseline Creatinine  0.80    ENDOCRINE    - BMI: Estimated body mass index is 30.07 kg/m  as calculated from the following:    Height as of this encounter: 1.702  "m (5' 7\").    Weight as of this encounter: 87.1 kg (192 lb).  Obesity (BMI >30)  - No history of Diabetes Mellitus    HEME  VTE High Risk 3%             Total Score: 8    VTE: Greater than 59 yrs old    VTE: Male    VTE: Current cancer      - No history of abnormal bleeding or antiplatelet use.      MSK  Patient is NOT Frail             Total Score: 0        Different anesthesia methods/types have been discussed with the patient, but they are aware that the final plan will be decided by the assigned anesthesia provider on the date of service.  Patient was discussed with Dr. Stephenson    The patient is optimized for their procedure. AVS with information on surgery time/arrival time, meds and NPO status given by nursing staff. No further diagnostic testing indicated.      On the day of service:     Prep time: 15 minutes  Visit time: 10 minutes  Documentation time: 15 minutes  ------------------------------------------  Total time: 40 minutes      Yeni Jones PA-C  Preoperative Assessment Center  [facility]  Clinic and Surgery Center  Phone: [phone]  Fax: [phone]    "

## 2025-01-31 ENCOUNTER — HOSPITAL ENCOUNTER (OUTPATIENT)
Facility: AMBULATORY SURGERY CENTER | Age: 70
Discharge: HOME | End: 2025-01-31
Attending: SURGERY
Payer: COMMERCIAL

## 2025-01-31 VITALS
SYSTOLIC BLOOD PRESSURE: 99 MMHG | DIASTOLIC BLOOD PRESSURE: 69 MMHG | WEIGHT: 192 LBS | HEART RATE: 71 BPM | RESPIRATION RATE: 16 BRPM | HEIGHT: 67 IN | TEMPERATURE: 97 F | OXYGEN SATURATION: 99 % | BODY MASS INDEX: 30.13 KG/M2

## 2025-01-31 DIAGNOSIS — K40.90 LEFT INGUINAL HERNIA: Primary | ICD-10-CM

## 2025-01-31 PROCEDURE — 49507 PRP I/HERN INIT BLOCK >5 YR: CPT | Mod: LT | Performed by: SURGERY

## 2025-01-31 PROCEDURE — 49507 PRP I/HERN INIT BLOCK >5 YR: CPT | Mod: 58 | Performed by: SURGERY

## 2025-01-31 DEVICE — MESH HERNIA ABDOMINAL SELF GRIPPING 15X9CM PROGRIP TEM1509G: Type: IMPLANTABLE DEVICE | Site: GROIN | Status: FUNCTIONAL

## 2025-01-31 RX ORDER — MAGNESIUM HYDROXIDE 1200 MG/15ML
LIQUID ORAL PRN
Status: DISCONTINUED | OUTPATIENT
Start: 2025-01-31 | End: 2025-01-31 | Stop reason: HOSPADM

## 2025-01-31 RX ORDER — DEXAMETHASONE SODIUM PHOSPHATE 10 MG/ML
4 INJECTION, SOLUTION INTRAMUSCULAR; INTRAVENOUS
Status: DISCONTINUED | OUTPATIENT
Start: 2025-01-31 | End: 2025-02-01 | Stop reason: HOSPADM

## 2025-01-31 RX ORDER — ACETAMINOPHEN 325 MG/1
975 TABLET ORAL ONCE
Status: DISCONTINUED | OUTPATIENT
Start: 2025-01-31 | End: 2025-01-31 | Stop reason: HOSPADM

## 2025-01-31 RX ORDER — NALOXONE HYDROCHLORIDE 0.4 MG/ML
0.1 INJECTION, SOLUTION INTRAMUSCULAR; INTRAVENOUS; SUBCUTANEOUS
Status: DISCONTINUED | OUTPATIENT
Start: 2025-01-31 | End: 2025-02-01 | Stop reason: HOSPADM

## 2025-01-31 RX ORDER — OXYCODONE HYDROCHLORIDE 5 MG/1
5 TABLET ORAL
Status: DISCONTINUED | OUTPATIENT
Start: 2025-01-31 | End: 2025-02-01 | Stop reason: HOSPADM

## 2025-01-31 RX ORDER — FENTANYL CITRATE 50 UG/ML
25 INJECTION, SOLUTION INTRAMUSCULAR; INTRAVENOUS
Status: DISCONTINUED | OUTPATIENT
Start: 2025-01-31 | End: 2025-02-01 | Stop reason: HOSPADM

## 2025-01-31 RX ORDER — OXYCODONE HYDROCHLORIDE 5 MG/1
5-10 TABLET ORAL EVERY 4 HOURS PRN
Qty: 10 TABLET | Refills: 0 | Status: SHIPPED | OUTPATIENT
Start: 2025-01-31

## 2025-01-31 RX ORDER — LIDOCAINE 40 MG/G
CREAM TOPICAL
Status: DISCONTINUED | OUTPATIENT
Start: 2025-01-31 | End: 2025-01-31 | Stop reason: HOSPADM

## 2025-01-31 RX ORDER — ONDANSETRON 4 MG/1
4 TABLET, ORALLY DISINTEGRATING ORAL EVERY 30 MIN PRN
Status: DISCONTINUED | OUTPATIENT
Start: 2025-01-31 | End: 2025-02-01 | Stop reason: HOSPADM

## 2025-01-31 RX ORDER — SODIUM CHLORIDE, SODIUM LACTATE, POTASSIUM CHLORIDE, CALCIUM CHLORIDE 600; 310; 30; 20 MG/100ML; MG/100ML; MG/100ML; MG/100ML
INJECTION, SOLUTION INTRAVENOUS CONTINUOUS
Status: DISCONTINUED | OUTPATIENT
Start: 2025-01-31 | End: 2025-01-31 | Stop reason: HOSPADM

## 2025-01-31 RX ORDER — OXYCODONE HYDROCHLORIDE 5 MG/1
10 TABLET ORAL
Status: DISCONTINUED | OUTPATIENT
Start: 2025-01-31 | End: 2025-02-01 | Stop reason: HOSPADM

## 2025-01-31 RX ORDER — ONDANSETRON 2 MG/ML
4 INJECTION INTRAMUSCULAR; INTRAVENOUS EVERY 30 MIN PRN
Status: DISCONTINUED | OUTPATIENT
Start: 2025-01-31 | End: 2025-02-01 | Stop reason: HOSPADM

## 2025-01-31 RX ORDER — AMOXICILLIN 250 MG
1-2 CAPSULE ORAL 2 TIMES DAILY
Qty: 15 TABLET | Refills: 0 | Status: SHIPPED | OUTPATIENT
Start: 2025-01-31

## 2025-01-31 RX ORDER — CEFAZOLIN SODIUM 2 G/50ML
2 SOLUTION INTRAVENOUS SEE ADMIN INSTRUCTIONS
Status: DISCONTINUED | OUTPATIENT
Start: 2025-01-31 | End: 2025-01-31 | Stop reason: HOSPADM

## 2025-01-31 RX ORDER — CEFAZOLIN SODIUM 2 G/50ML
2 SOLUTION INTRAVENOUS
Status: COMPLETED | OUTPATIENT
Start: 2025-01-31 | End: 2025-01-31

## 2025-01-31 RX ORDER — BUPIVACAINE HYDROCHLORIDE 2.5 MG/ML
INJECTION, SOLUTION INFILTRATION; PERINEURAL PRN
Status: DISCONTINUED | OUTPATIENT
Start: 2025-01-31 | End: 2025-01-31 | Stop reason: HOSPADM

## 2025-01-31 NOTE — DISCHARGE INSTRUCTIONS
"[facility]  Home Care Following Anesthesia  For 24 hours after surgery:  Get plenty of rest.  A responsible adult must stay with you for at least 24 hours after you leave the surgery center.  Do not drive or use heavy equipment.  If you have weakness or tingling, don't drive or use heavy equipment until this feeling goes away.   Do not drink alcohol.   Avoid strenuous or risky activities.  Ask for help when climbing stairs.  You may feel lightheaded.  IF so, sit for a few minutes before standing.  Have someone help you get up.   If you have nausea (feel sick to your stomach): Drink only clear liquids such as apple juice, ginger ale, broth or 7-Up.  Rest may also help.  Be sure to drink enough fluids.  Move to a regular diet as you feel able.   You may have a slight fever.  Call the doctor if your fever is over 100 F (37.7 C) (taken under the tongue) or lasts longer than 24 hours.  You may have a dry mouth, a sore throat, muscle aches or trouble sleeping. These should go away after 24 hours.  Do not make important or legal decisions.   It is recommended to avoid smoking.        Today you received a Marcaine or bupivacaine block to numb the nerves near your surgery site.  This is a block using local anesthetic or \"numbing\" medication injected around the nerves to anesthetize or \"numb\" the area supplied by those nerves.  This block is injected into the muscle layer near your surgical site.  The medication may numb the location where you had surgery for 6-18 hours, but may last up to 24 hours.  If your surgical site is an arm or leg you should be careful with your affected limb, since it is possible to injure your limb without being aware of it due to the numbing.  Until full feeling returns, you should guard against bumping or hitting your limb, and avoid extreme hot or cold temperatures on the skin.  As the block wears off, the feeling will return as a tingling or prickly " sensation near your surgical site.  You will experience more discomfort from your incision as the feeling returns.  You may want to take a pain pill (a narcotic or Tylenol if this was prescribed by your surgeon) when you start to experience mild pain before the pain beccomes more severe.  If your pain medications do not control your pain you should notifiy your surgeon.    Tips for taking pain medications  To get the best pain relief possible, remember these points:  Take pain medications as directed, before pain becomes severe.  Pain medication can upset your stomach: taking it with food may help.  Constipation is a common side effect of pain medication. Drink plenty of  fluids.  Eat foods high in fiber. Take a stool softener if recommended by your doctor or pharmacist.  Do not drink alcohol, drive or operate machinery while taking pain medications.  Ask about other ways to control pain, such as with heat, ice or relaxation.    Tylenol/Acetaminophen Consumption    If you feel your pain relief is insufficient, you may take Tylenol/Acetaminophen in addition to your narcotic pain medication.   Be careful not to exceed 4,000 mg of Tylenol/Acetaminophen in a 24 hour period from all sources.  If you are taking extra strength Tylenol/acetaminophen (500 mg), the maximum dose is 8 tablets in 24 hours.  If you are taking regular strength acetaminophen (325 mg), the maximum dose is 12 tablets in 24 hours.    Call a doctor for any of the following:  Signs of infection (fever, growing tenderness at the surgery site, a large amount of drainage or bleeding, severe pain, foul-smelling drainage, redness, swelling).  It has been over 8 to 10 hours since surgery and you are still not able to urinate (pass water).  Headache for over 24 hours.  Numbness, tingling or weakness the day after surgery (if you had spinal anesthesia).  Signs of Covid-19 infection (temperature over 100 degrees, shortness of breath, cough, loss of taste/smell,  generalized body aches, persistent headache, chills, sore throat, nausea/vomiting/diarrhea)    Your doctor is:  Dr. Seng Garcia, General Surgery: [phone]                    After hours and weekends call the hospital @ [phone] and ask for the resident on call for:  General Surgery  For emergency care, call the:  [facility]:  [phone] (TTY for hearing impaired: [phone])

## 2025-01-31 NOTE — OP NOTE
Operative report    Preop diagnosis: Left inguinal hernia  Postop diagnosis: Same    Operative procedure: Open mesh repair left inguinal hernia    Surgeon: CLOVIS Garcia  Assistant: MINERVA Woods  Anesthesia: IV sedation plus local filtration of Marcaine    Indications for procedure: She presents with a left inguinal hernia.  Full informed consent was obtain in clinic and reconfirmed in today's preoperative discussion.    Operative findings: Left inguinal direct hernia containing sigmoid colon.    Procedure: Patient brought to the operating room and put under IV sedation.  His left inguinal region was widely prepped and draped in usual sterile fashion.  With Marcaine throughout for adequate anesthetic.  Skin incision along the inguinal crease and dissection carried down to external aponeurosis.  Field block was then placed with Marcaine field block was then placed with Marcaine under the external aponeurosis.  Opened to the external ring this provided excellent exposure of fair sized direct sac that was opened to allow for decompression of the sac into the preperitoneal space.  Sigmoid colon was in the sac.  The sac was closed with a running Vicryl stitch.  Once reduced we initiated our routine mesh Gurpreet repair.  This was done using a 15 x 9 cm pariah Gianfranco ProGrip mesh slid out laterally passed around the cord that was isolated with a Penrose.  The mesh was placed medially to rectus fascia at the midline and tacked down with 0 Vicryl.  Lateral tails were crossed and secured to inguinal ligament per my routine.  Tails were tucked between internal extra aponeurosis.  The ilio inguinal nerve was identified and free of our dissection.  Once this completes external bleak was closed with a running Vicryl stitch.  Pedro Luis relief long-acting anesthetic was placed under the external oblique and over her routine.  Closed in layers with Vicryl.  Skin closed with subcuticular of Monocryl.    Estimated blood loss:  Minimal  Pathology: None  The patient was taken to the recovery room where he was without difficulty or apparent complication.

## 2025-02-03 ENCOUNTER — PATIENT OUTREACH (OUTPATIENT)
Dept: SURGERY | Facility: CLINIC | Age: 70
End: 2025-02-03
Payer: COMMERCIAL

## 2025-02-03 NOTE — PROGRESS NOTES
RN Post-Op/Post-Discharge Care Coordination Note    Spoke with Patient.    Support  Patient able to care for self independently     Health Status  Nausea/Vomiting: Patient denies nausea/vomiting.  Eating/drinking: Patient is able to eat and drink without any complaints.  Diet:  Regular  Bowel habits: Patient reports constipation with last BM ~2 days ago. He is passing flatus and recommended that he increase Senna to 2 tabs BID and Miralax tomorrow per package instructions if no BM.  Drains (LOS): N/A  Fevers/chills: Patient denies any fever or chills.  Incisions: Patient denies any signs and symptoms of infection..  Wound closure:  Steri-strips  Pain: Slowly improving. Well managed.    Activity/Restrictions  No lifting in excess of 15-20 pounds for 3-4 weeks    Equipment  Athletic Supporter- not wearing    Pathology reviewed with patient:  N/A    Forms/Letters  No    All of his questions were answered including reviewing restrictions and wound care.  He will call this office if he has any further questions and/or concerns.      PO appointment arranged with Dr. Garcia on 2/11 at 0830, per patient request.    Whom and When to Call  Patient acknowledges understanding of how to manage any medication changes and   when to seek medical care.     Patient advised that if after hour medical concerns arise to please call [phone] and choose option 4 to speak to the physician on call.

## 2025-02-03 NOTE — PROGRESS NOTES
02/03/25    8:59 AM     Edd Bazan is a patient of Dr. Seng Garcia that underwent open inguinal hernia repair approximately 3 days ago (1/31).  Attempted to contact patient via telephone for a status update and review post-op  teaching.  LM on VM to call office.  Await return call.      Of note:  Wound:  Steri-strips  Follow-up:  Routine  Restrictions:  - No strenuous exercise for 3-4 weeks  - No lifting, pushing, pulling more than 15-20 pounds for 3-4 weeks  New medications:  Oxycodone, Senna  Equipment/Supplies:  None  Diet:  Regular

## 2025-02-07 ENCOUNTER — APPOINTMENT (OUTPATIENT)
Dept: LAB | Facility: CLINIC | Age: 70
End: 2025-02-07
Attending: INTERNAL MEDICINE
Payer: COMMERCIAL

## 2025-02-10 ENCOUNTER — PRE VISIT (OUTPATIENT)
Dept: SURGERY | Facility: CLINIC | Age: 70
End: 2025-02-10
Payer: COMMERCIAL

## 2025-02-10 ENCOUNTER — TELEPHONE (OUTPATIENT)
Dept: ONCOLOGY | Facility: CLINIC | Age: 70
End: 2025-02-10
Payer: COMMERCIAL

## 2025-02-10 NOTE — TELEPHONE ENCOUNTER
Patient Telephone Reminder Call    Date of call:  02/10/25  Phone numbers:  Cell number on file:    Telephone Information:   Mobile [phone]       Reached patient/confirmed appointment:  Yes  Appointment with:   Dr. Seng Garcia  Reason for visit:  PO  Remind patient that this visit is a consultation only, NO procedure:  Yes  Can this visit be changed to a video visit:  No

## 2025-02-11 ENCOUNTER — OFFICE VISIT (OUTPATIENT)
Dept: SURGERY | Facility: CLINIC | Age: 70
End: 2025-02-11
Payer: COMMERCIAL

## 2025-02-11 VITALS
WEIGHT: 186.5 LBS | HEIGHT: 67 IN | OXYGEN SATURATION: 95 % | DIASTOLIC BLOOD PRESSURE: 75 MMHG | BODY MASS INDEX: 29.27 KG/M2 | HEART RATE: 90 BPM | SYSTOLIC BLOOD PRESSURE: 107 MMHG

## 2025-02-11 DIAGNOSIS — Z98.890 POSTOPERATIVE STATE: Primary | ICD-10-CM

## 2025-02-11 DIAGNOSIS — E78.5 HYPERLIPIDEMIA WITH TARGET LDL LESS THAN 100: ICD-10-CM

## 2025-02-11 PROCEDURE — 99024 POSTOP FOLLOW-UP VISIT: CPT | Performed by: SURGERY

## 2025-02-11 RX ORDER — ATORVASTATIN CALCIUM 10 MG/1
10 TABLET, FILM COATED ORAL DAILY
Qty: 90 TABLET | Refills: 3 | Status: SHIPPED | OUTPATIENT
Start: 2025-02-11

## 2025-02-11 ASSESSMENT — PAIN SCALES - GENERAL: PAINLEVEL_OUTOF10: NO PAIN (0)

## 2025-02-11 NOTE — LETTER
2/11/2025       RE: Edd Bazan  [address]  [place] [address]     Dear Colleague,    Thank you for referring your patient, Edd Bazan, to the [facility] [place] [facility]. Please see a copy of my visit note below.    Edd Bazan is status post:  1/31/2025  Herniorrhaphy inguinal (Left) Procedure: HERNIORRHAPHY, INGUINAL, OPEN;  Surgeon: Seng Garcia MD;  Location: [facility] OR  Surgery without complications.  Post-op course without complications.  Incisions examined, no signs of infection.  All of the patients questions were answered.  Standard post-op instructions and restrictions given.  Follow-up as needed       Again, thank you for allowing me to participate in the care of your patient.      Sincerely,    Seng Garcia MD

## 2025-02-11 NOTE — PATIENT INSTRUCTIONS
You met with Dr. Seng Garcia.      Today's visit instructions:    Return to the Surgery Clinic on an as needed basis.        If you have questions please contact Katherin RN or Meghna RN during regular clinic hours, Monday through Friday 7:30 AM - 4:00 PM, or you can contact us via [employer] at anytime.       If you have urgent needs after-hours, weekends, or holidays please call the hospital at [phone] and ask to speak with our on-call General Surgery Team.    Appointment [phone]  Nurse Advice (Katherin or Meghna): [phone]   Surgery Scheduler (Carly): [phone]  Billing Customer Service:  [phone]  Fax: [phone]

## 2025-02-11 NOTE — PROGRESS NOTES
Edd STACK Beni is status post:  1/31/2025  Herniorrhaphy inguinal (Left) Procedure: HERNIORRHAPHY, INGUINAL, OPEN;  Surgeon: Seng Garcia MD;  Location: [facility] OR  Surgery without complications.  Post-op course without complications.  Incisions examined, no signs of infection.  All of the patients questions were answered.  Standard post-op instructions and restrictions given.  Follow-up as needed

## 2025-02-11 NOTE — NURSING NOTE
"Chief Complaint   Patient presents with    Post-op Visit       Vitals:    02/11/25 0829   BP: 107/75   BP Location: Left arm   Patient Position: Sitting   Cuff Size: Adult Regular   Pulse: 90   SpO2: 95%   Weight: 186 lb 8 oz   Height: 5' 7\"       Body mass index is 29.21 kg/m .    Carlos Rubio EMT-P    "

## 2025-02-13 ENCOUNTER — PATIENT OUTREACH (OUTPATIENT)
Dept: ONCOLOGY | Facility: CLINIC | Age: 70
End: 2025-02-13
Payer: COMMERCIAL

## 2025-02-13 NOTE — PROGRESS NOTES
[facility]: Cancer Care                                                                                          Received message from [employer] that second attempt at testing was unsuccessful.  Message sent to provider.    Veronica OSEGUERA RN  Cancer Care Coordinator  [facility]

## 2025-02-17 ENCOUNTER — PATIENT OUTREACH (OUTPATIENT)
Dept: ONCOLOGY | Facility: CLINIC | Age: 70
End: 2025-02-17
Payer: COMMERCIAL

## 2025-02-17 NOTE — PROGRESS NOTES
[facility]: Cancer Care                                                                                          Per MD, ordered [employer] blood draw to be completed on 3/21 at next lab appt.    Veronica OSEGUERA RN  Cancer Care Coordinator  [facility]

## 2025-02-25 ENCOUNTER — DOCUMENTATION ONLY (OUTPATIENT)
Dept: ONCOLOGY | Facility: CLINIC | Age: 70
End: 2025-02-25
Payer: COMMERCIAL

## 2025-03-10 ENCOUNTER — TELEPHONE (OUTPATIENT)
Dept: ONCOLOGY | Facility: CLINIC | Age: 70
End: 2025-03-10
Payer: COMMERCIAL

## 2025-03-10 DIAGNOSIS — C77.5 METASTASIS TO ILIAC LYMPH NODE (H): ICD-10-CM

## 2025-03-10 DIAGNOSIS — Z79.899 ENCOUNTER FOR LONG-TERM (CURRENT) USE OF MEDICATIONS: ICD-10-CM

## 2025-03-10 DIAGNOSIS — C61 PROSTATE CANCER (H): Primary | ICD-10-CM

## 2025-03-10 RX ORDER — PREDNISONE 5 MG/1
5 TABLET ORAL
Qty: 30 TABLET | Refills: 0 | Status: SHIPPED | OUTPATIENT
Start: 2025-03-21 | End: 2025-04-20

## 2025-03-10 RX ORDER — ABIRATERONE ACETATE 250 MG/1
1000 TABLET ORAL DAILY
Qty: 120 TABLET | Refills: 0 | Status: SHIPPED | OUTPATIENT
Start: 2025-03-21 | End: 2025-04-20

## 2025-03-10 NOTE — ORAL ONC MGMT
Oral Chemotherapy Monitoring Program    Lab Monitoring Plan  CMP [id number] x 3 months, then monthly  BP - at one week, then monthly  Subjective/Objective:  Edd Bazan is a 69 year old male contacted by phone for an initial visit for oral chemotherapy education. He did receive our packet in the mail and had a chance to look ot over. Discussed DDI with patient and will monitor for potential muscle related AE with lipitor and GI AE with aspirin. Edd asked great questions, we discussed in detail strategies for taking the Zytiga on an empty stomach and Prednisone with food. He is also wondering about alcohol, he said he mostly just enjoys on the weekend - recommend abstain for now given risk of liver toxicity with Zytiga just to be on the safe side. Edd doesn't check his BP regularly at home but thinks he has a monitor somewhere at home and will check after started the Zytiga. Also discussed lab frequency, will send IB to our scheduling team to reach out for scheduling labs.     Sending Zytiga Rx to [facility] pharmacy per insurance mandate and prednisone to [facility] per patient request. Patient confirms understanding that he is not to start these medications until after his appt with Dr Whelan on 3/21/25.    Assessment:  Patient is appropriate to start therapy pending Dr Whelan appt and labs 3/21/25.    Plan:  Basic chemotherapy teaching was reviewed with the patient including indication, start date of therapy, dose, administration, adverse effects, missed doses, food and drug interactions, monitoring, side effect management, office contact information, and safe handling. Written materials were provided and all questions answered.    Follow-Up:  3/21 Lab / Dr Whelan / infusion appts then will reach out to patient one week after starting for initial assessment     Caryn Jean Baptiste, PharmD, [facility]  [facility]  [facility]  [phone]  March 10, 2025          2/25/2025     " 3:00 PM 3/10/2025     1:00 PM 3/10/2025     2:00 PM   ORAL CHEMOTHERAPY   Assessment Type Initial Work up New Teach    Diagnosis Code Prostate Cancer Prostate Cancer    Providers [facility] Name/Location Miguel Rivera    Is this patient followed by the [facility] OC team? No No    Drug Name Zytiga (abiraterone) Zytiga (abiraterone)    Dose 1,000 mg 1,000 mg    Current Schedule Daily Daily    Cycle Details Continuous Continuous    Planned next cycle start date 3/21/2025 3/21/2025    Any new drug interactions? Yes  Yes   Pharmacist Intervention? Yes  Yes   Intervention(s) Patient Education  Patient Education   Is the dose as ordered appropriate for the patient? Yes  Yes   Was a medication prescribed as part of a CPA? No         Last PHQ-2 Score on record:       1/30/2025     7:16 AM 1/14/2025     8:58 AM   PHQ-2 ( 1999 [employer])   Q1: Little interest or pleasure in doing things 0 0   Q2: Feeling down, depressed or hopeless 0 0   PHQ-2 Score 0 0    Q1: Little interest or pleasure in doing things  Not at all   Q2: Feeling down, depressed or hopeless  Not at all   PHQ-2 Score  0       Patient-reported       Vitals:  BP:   BP Readings from Last 1 Encounters:   02/11/25 107/75     Wt Readings from Last 1 Encounters:   02/11/25 84.6 kg (186 lb 8 oz)     Estimated body surface area is 2 meters squared as calculated from the following:    Height as of 2/11/25: 1.702 m (5' 7\").    Weight as of 2/11/25: 84.6 kg (186 lb 8 oz).    Labs:  No results found for NA within last 30 days.     No results found for K within last 30 days.     No results found for CA within last 30 days.     No results found for Mag within last 30 days.     No results found for Phos within last 30 days.     No results found for ALBUMIN within last 30 days.     No results found for BUN within last 30 days.     No results found for CR within last 30 days.     No results found for AST within last 30 days.     No results found for ALT within last 30 " days.     No results found for BILITOTAL within last 30 days.     No results found for WBC within last 30 days.     No results found for HGB within last 30 days.     No results found for PLT within last 30 days.     No results found for ANC within last 30 days.     No results found for ANC within last 30 days.

## 2025-03-21 ENCOUNTER — APPOINTMENT (OUTPATIENT)
Dept: LAB | Facility: CLINIC | Age: 70
End: 2025-03-21
Attending: INTERNAL MEDICINE
Payer: COMMERCIAL

## 2025-03-24 ENCOUNTER — DOCUMENTATION ONLY (OUTPATIENT)
Dept: ONCOLOGY | Facility: CLINIC | Age: 70
End: 2025-03-24
Payer: COMMERCIAL

## 2025-03-31 DIAGNOSIS — C61 PROSTATE CANCER (H): ICD-10-CM

## 2025-03-31 DIAGNOSIS — C77.5 METASTASIS TO ILIAC LYMPH NODE (H): ICD-10-CM

## 2025-04-01 RX ORDER — ABIRATERONE ACETATE 250 MG/1
TABLET ORAL
Qty: 120 TABLET | Refills: 0 | Status: SHIPPED | OUTPATIENT
Start: 2025-04-01

## 2025-04-07 ENCOUNTER — TELEPHONE (OUTPATIENT)
Dept: ONCOLOGY | Facility: CLINIC | Age: 70
End: 2025-04-07

## 2025-04-07 ENCOUNTER — LAB (OUTPATIENT)
Dept: LAB | Facility: CLINIC | Age: 70
End: 2025-04-07
Payer: COMMERCIAL

## 2025-04-07 DIAGNOSIS — C61 PROSTATE CANCER (H): ICD-10-CM

## 2025-04-07 DIAGNOSIS — C77.5 METASTASIS TO ILIAC LYMPH NODE (H): ICD-10-CM

## 2025-04-07 LAB
ALBUMIN SERPL BCG-MCNC: 4.2 G/DL (ref 3.5–5.2)
ALP SERPL-CCNC: 70 U/L (ref 40–150)
ALT SERPL W P-5'-P-CCNC: 31 U/L (ref 0–70)
ANION GAP SERPL CALCULATED.3IONS-SCNC: 8 MMOL/L (ref 7–15)
AST SERPL W P-5'-P-CCNC: 25 U/L (ref 0–45)
BILIRUB SERPL-MCNC: 0.3 MG/DL
BUN SERPL-MCNC: 17.9 MG/DL (ref 8–23)
CALCIUM SERPL-MCNC: 9.6 MG/DL (ref 8.8–10.4)
CHLORIDE SERPL-SCNC: 103 MMOL/L (ref 98–107)
CREAT SERPL-MCNC: 0.8 MG/DL (ref 0.67–1.17)
EGFRCR SERPLBLD CKD-EPI 2021: >90 ML/MIN/1.73M2
GLUCOSE SERPL-MCNC: 95 MG/DL (ref 70–99)
HCO3 SERPL-SCNC: 29 MMOL/L (ref 22–29)
POTASSIUM SERPL-SCNC: 4.1 MMOL/L (ref 3.4–5.3)
PROT SERPL-MCNC: 6.8 G/DL (ref 6.4–8.3)
SODIUM SERPL-SCNC: 140 MMOL/L (ref 135–145)

## 2025-04-07 PROCEDURE — 84155 ASSAY OF PROTEIN SERUM: CPT

## 2025-04-07 PROCEDURE — 36415 COLL VENOUS BLD VENIPUNCTURE: CPT

## 2025-04-07 NOTE — ORAL ONC MGMT
Oral Chemotherapy Program  Lab review     Reviewed labs from today, 4/7. Labs are unremarkable and do not require any dose adjustments at this time. Patient called and aware of lab results.      Follow-up/plan  4/21 next set of labs     Flaca Rodriguez PharmD  Oral Chemotherapy Monitoring Program  [facility]  [phone]

## 2025-04-07 NOTE — NURSING NOTE
Chief Complaint   Patient presents with    Blood Draw     Vpt blood draw by lab RN     Venipuncture labs drawn by lab RN    Chari Mayers, RN

## 2025-04-10 DIAGNOSIS — C61 PROSTATE CANCER (H): ICD-10-CM

## 2025-04-10 DIAGNOSIS — C77.5 METASTASIS TO ILIAC LYMPH NODE (H): ICD-10-CM

## 2025-04-10 RX ORDER — PREDNISONE 5 MG/1
5 TABLET ORAL
Qty: 30 TABLET | Refills: 0 | Status: SHIPPED | OUTPATIENT
Start: 2025-04-10

## 2025-04-10 NOTE — PROGRESS NOTES
[facility]: Cancer Care                                                                                          Patient called to report he's going to be receiving the abiraterone in the mail soon & needs the prednisone filled.  Confirmed pharmacy & sent refill.    Veronica OSEGUERA RN  Cancer Care Coordinator  [facility]

## 2025-04-15 ENCOUNTER — TELEPHONE (OUTPATIENT)
Dept: RADIATION ONCOLOGY | Facility: CLINIC | Age: 70
End: 2025-04-15
Payer: COMMERCIAL

## 2025-04-17 ENCOUNTER — OFFICE VISIT (OUTPATIENT)
Dept: RADIATION ONCOLOGY | Facility: CLINIC | Age: 70
End: 2025-04-17
Attending: UROLOGY
Payer: COMMERCIAL

## 2025-04-17 DIAGNOSIS — C61 PROSTATE CANCER (H): ICD-10-CM

## 2025-04-17 DIAGNOSIS — C61 PROSTATE CANCER (H): Primary | ICD-10-CM

## 2025-04-17 PROCEDURE — 99213 OFFICE O/P EST LOW 20 MIN: CPT | Performed by: RADIOLOGY

## 2025-04-17 PROCEDURE — 77334 RADIATION TREATMENT AID(S): CPT | Performed by: RADIOLOGY

## 2025-04-17 NOTE — PROGRESS NOTES
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 C/A/P CT on 1/10/25 showed no evidence of disease in the chest, abdomen, or pelvis. Bone scan 1/10/25 was also negative. He continues to wear 1 pad per day for stress incontinence. ADT with Lupron and abiraterone were started on 3/21/25. PSA on 2/7/25 had measured 0.02. He continues to wear 1 pad per day for stress incontinence.    OBJECTIVE: There were no vitals taken for this visit.    ASSESSMENT: 69 year old year old male with pathologic stage T3a N1 [place] 3+4 prostate adenocarcinoma status post robotic assisted prostatectomy and right pelvic lymph node lymphadenectomy on December 5, 2024. He had focally positive margins (right lateral, left apical, and left lateral) and 2 out of 5 right pelvic lymph nodes showed metastatic carcinoma.     PLAN:   Simulation is scheduled for later today.  Side effects of radiotherapy were reviewed in detail, and informed consent was signed.  We will plan on beginning radiation (7-week course) in approximately 2 weeks.    Kevin Pacheco M.D.  Department of Radiation Oncology  [facility]

## 2025-04-17 NOTE — PROGRESS NOTES
"Oncology Rooming Note    April 17, 2025 10:00 AM   Edd Bazan is a 69 year old male who presents for:    Chief Complaint   Patient presents with    Oncology Clinic Visit     Treatment Planning     Initial Vitals: There were no vitals taken for this visit. Estimated body mass index is 30.06 kg/m  as calculated from the following:    Height as of 3/21/25: 1.702 m (5' 7.01\").    Weight as of 3/21/25: 87.1 kg (192 lb). There is no height or weight on file to calculate BSA.  Data Unavailable Comment: Data Unavailable   No LMP for male patient.  Allergies reviewed: Yes  Medications reviewed: Yes    Medications: Medication refills not needed today.  Pharmacy name entered into TMS:    [id number] [place] [place] [facility] [address] [facility] [place], [place] - [address] [place] [address]    Frailty Screening:   Is the patient here for a new oncology consult visit in cancer care? 2. No    PHQ9:  Did this patient require a PHQ9?: No      Clinical concerns: Treatment Planning       Angelika Lal RN  Considerations for radiation treatment   Pregnancy status: Male   Implanted Cardiac Devices: No   Any previous radiation therapy: No    Radiation Therapy Patient Education    Person involved with teaching: Patient    Patient educational needs for self management of treatment-related side effects assessment completed.  [facility] Patient Ed tab contains Patient Learning Assessment    Education Materials Given  Radiation Therapy and You    Educational Topics Discussed  Side effects expected, Pain management, Skin care, and When to call MD/RN    Response To Teaching  Verbalizes understanding    GYN Only  Vaginal Dilator-given and educated: N/A    Referrals sent: None    Chemotherapy?  No          "

## 2025-04-17 NOTE — LETTER
4/17/2025      Edd Bazan  [address]  [place] [address]      Dear Colleague,    Thank you for referring your patient, Edd Bazan, to the [facility] RADIATION ONCOLOGY. Please see a copy of my visit note below.    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 C/A/P CT on 1/10/25 showed no evidence of disease in the chest, abdomen, or pelvis. Bone scan 1/10/25 was also negative. He continues to wear 1 pad per day for stress incontinence. ADT with Lupron and abiraterone were started on 3/21/25. PSA on 2/7/25 had measured 0.02. He continues to wear 1 pad per day for stress incontinence.    OBJECTIVE: There were no vitals taken for this visit.    ASSESSMENT: 69 year old year old male with pathologic stage T3a N1 [place] 3+4 prostate adenocarcinoma status post robotic assisted prostatectomy and right pelvic lymph node lymphadenectomy on December 5, 2024. He had focally positive margins (right lateral, left apical, and left lateral) and 2 out of 5 right pelvic lymph nodes showed metastatic carcinoma.     PLAN:   Simulation is scheduled for later today.  Side effects of radiotherapy were reviewed in detail, and informed consent was signed.  We will plan on beginning radiation (7-week course) in approximately 2 weeks.    Kevin Pacheco M.D.  Department of Radiation Oncology  [facility]     Oncology Rooming Note    April 17, 2025 10:00 AM   Edd Bazan is a 69 year old male who presents for:    Chief Complaint   Patient presents with     Oncology Clinic Visit     Treatment Planning     Initial Vitals: There were no vitals taken for this visit. Estimated body mass index is 30.06 kg/m  as calculated from the following:    Height as of 3/21/25: 1.702 m (5'  "7.01\").    Weight as of 3/21/25: 87.1 kg (192 lb). There is no height or weight on file to calculate BSA.  Data Unavailable Comment: Data Unavailable   No LMP for male patient.  Allergies reviewed: Yes  Medications reviewed: Yes    Medications: Medication refills not needed today.  Pharmacy name entered into [facility]:    [id number] [place] [facility] [place], [place] - [address] - [place] - [address] [facility]    Frailty Screening:   Is the patient here for a new oncology consult visit in cancer care? 2. No    PHQ9:  Did this patient require a PHQ9?: No      Clinical concerns: Treatment Planning       Angelika Lal RN  Considerations for radiation treatment   Pregnancy status: Male   Implanted Cardiac Devices: No   Any previous radiation therapy: No    Radiation Therapy Patient Education    Person involved with teaching: Patient    Patient educational needs for self management of treatment-related side effects assessment completed.  [facility] Patient Ed tab contains Patient Learning Assessment    Education Materials Given  Radiation Therapy and You    Educational Topics Discussed  Side effects expected, Pain management, Skin care, and When to call MD/RN    Response To Teaching  Verbalizes understanding    GYN Only  Vaginal Dilator-given and educated: N/A    Referrals sent: None    Chemotherapy?  No            Again, thank you for allowing me to participate in the care of your patient.        Sincerely,        Kevin Pacheco MD    Electronically signed"

## 2025-04-21 ENCOUNTER — LAB (OUTPATIENT)
Dept: LAB | Facility: CLINIC | Age: 70
End: 2025-04-21
Attending: INTERNAL MEDICINE
Payer: COMMERCIAL

## 2025-04-21 ENCOUNTER — TELEPHONE (OUTPATIENT)
Dept: ONCOLOGY | Facility: CLINIC | Age: 70
End: 2025-04-21

## 2025-04-21 DIAGNOSIS — C61 PROSTATE CANCER (H): ICD-10-CM

## 2025-04-21 LAB
ALBUMIN SERPL BCG-MCNC: 4.5 G/DL (ref 3.5–5.2)
ALP SERPL-CCNC: 70 U/L (ref 40–150)
ALT SERPL W P-5'-P-CCNC: 14 U/L (ref 0–70)
ANION GAP SERPL CALCULATED.3IONS-SCNC: 10 MMOL/L (ref 7–15)
AST SERPL W P-5'-P-CCNC: 15 U/L (ref 0–45)
BASOPHILS # BLD AUTO: 0 10E3/UL (ref 0–0.2)
BASOPHILS NFR BLD AUTO: 0 %
BILIRUB SERPL-MCNC: 0.6 MG/DL
BUN SERPL-MCNC: 22.8 MG/DL (ref 8–23)
CALCIUM SERPL-MCNC: 9.6 MG/DL (ref 8.8–10.4)
CHLORIDE SERPL-SCNC: 99 MMOL/L (ref 98–107)
CREAT SERPL-MCNC: 0.8 MG/DL (ref 0.67–1.17)
EGFRCR SERPLBLD CKD-EPI 2021: >90 ML/MIN/1.73M2
EOSINOPHIL # BLD AUTO: 0.2 10E3/UL (ref 0–0.7)
EOSINOPHIL NFR BLD AUTO: 3 %
ERYTHROCYTE [DISTWIDTH] IN BLOOD BY AUTOMATED COUNT: 14.1 % (ref 10–15)
GLUCOSE SERPL-MCNC: 105 MG/DL (ref 70–99)
HCO3 SERPL-SCNC: 27 MMOL/L (ref 22–29)
HCT VFR BLD AUTO: 39.6 % (ref 40–53)
HGB BLD-MCNC: 13.5 G/DL (ref 13.3–17.7)
IMM GRANULOCYTES # BLD: 0 10E3/UL
IMM GRANULOCYTES NFR BLD: 0 %
LYMPHOCYTES # BLD AUTO: 1.3 10E3/UL (ref 0.8–5.3)
LYMPHOCYTES NFR BLD AUTO: 28 %
MCH RBC QN AUTO: 28.7 PG (ref 26.5–33)
MCHC RBC AUTO-ENTMCNC: 34.1 G/DL (ref 31.5–36.5)
MCV RBC AUTO: 84 FL (ref 78–100)
MONOCYTES # BLD AUTO: 0.6 10E3/UL (ref 0–1.3)
MONOCYTES NFR BLD AUTO: 13 %
NEUTROPHILS # BLD AUTO: 2.6 10E3/UL (ref 1.6–8.3)
NEUTROPHILS NFR BLD AUTO: 55 %
NRBC # BLD AUTO: 0 10E3/UL
NRBC BLD AUTO-RTO: 0 /100
PLATELET # BLD AUTO: 233 10E3/UL (ref 150–450)
POTASSIUM SERPL-SCNC: 3.5 MMOL/L (ref 3.4–5.3)
PROT SERPL-MCNC: 7.1 G/DL (ref 6.4–8.3)
PSA SERPL DL<=0.01 NG/ML-MCNC: <0.01 NG/ML (ref 0–6.5)
RBC # BLD AUTO: 4.7 10E6/UL (ref 4.4–5.9)
SODIUM SERPL-SCNC: 136 MMOL/L (ref 135–145)
WBC # BLD AUTO: 4.7 10E3/UL (ref 4–11)

## 2025-04-21 PROCEDURE — 84403 ASSAY OF TOTAL TESTOSTERONE: CPT

## 2025-04-21 PROCEDURE — 84153 ASSAY OF PSA TOTAL: CPT

## 2025-04-21 PROCEDURE — 82310 ASSAY OF CALCIUM: CPT

## 2025-04-21 PROCEDURE — 85004 AUTOMATED DIFF WBC COUNT: CPT

## 2025-04-21 PROCEDURE — 36415 COLL VENOUS BLD VENIPUNCTURE: CPT

## 2025-04-21 NOTE — ORAL ONC MGMT
Oral Chemotherapy Monitoring Program     Placed call to patient in follow up of oral chemotherapy, Zytiga. Tried to leave a message regarding lab results but voicemail box is full and could not leave a message. Will try patient again later.     Addendum: patient called back at 3:54pm and I relayed lab results. Reviewed upcoming appointments on 5/5. Patient had no further questions.    Flaca Rodriguez, DannyD  Oral Chemotherapy Monitoring Program  [facility]  [phone]

## 2025-04-21 NOTE — NURSING NOTE
Chief Complaint   Patient presents with    Labs Only     Blood drawn via VPT by LPN.     MINERVA Bowles LPN

## 2025-04-22 DIAGNOSIS — C77.5 METASTASIS TO ILIAC LYMPH NODE (H): ICD-10-CM

## 2025-04-22 DIAGNOSIS — C61 PROSTATE CANCER (H): ICD-10-CM

## 2025-04-22 RX ORDER — ABIRATERONE ACETATE 250 MG/1
TABLET ORAL
Qty: 120 TABLET | Refills: 0 | Status: SHIPPED | OUTPATIENT
Start: 2025-04-22

## 2025-04-23 LAB — TESTOST SERPL-MCNC: <2 NG/DL (ref 240–950)

## 2025-04-29 NOTE — PROGRESS NOTES
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  Patient presents with an elevated PSA of 24.3 ng/mL (07/01/2024), PSA 31.59 (07/29/2024).  MRI prostate 08/08/2024, revealed the prostate measuring 6.4 x 5.8 x 3.7 cm with a volume of 71 g.  There was a 14 mm T2 heterogeneous lesion in the 6:00 to 7:00 position in the right apex peripheral zone with 6/15 mm capsular abutment and capsular irregularity (PI-RADS 4) designated as lesion 1.  There was a 10 mm, T2 heterogeneous lesion in the 4:00 to 5:00 position in the left apex peripheral zone with 6/15 mm capsular abutment (PI-RADS 4) designated as lesion 2.  The left neurovascular bundle was abutted by lesion 1.  There was no seminal vesicle involvement.  There was no lymphadenopathy.  There were areas of increased enhancement along the inferior pubic ramus.  [facility] MRI-ultrasound-guided prostate core needle biopsy 10/04/2024, revealed a [place] 3+4=7 acinar adenocarcinoma with perineural invasion involving 50% of 2 of 2 biopsy samples with perineural invasion from the right mid gland, 56% of 2 of 2 biopsy samples with perineural invasion from the right apex, and 78 % of 2 of 2 biopsy samples with perineural invasion from lesion 1 in the right apex peripheral zone.  There was a [place] 3+3=6 acinar adenocarcinoma without perineural invasion involving 4% of 1 of 2 core needle biopsy samples from the left apex, and 33% of 2 of 2 biopsy samples with perineural invasion from the right base.  There was atypical small acinar proliferation (ASAP) in 2 of 2 core needle biopsy samples from lesion 2 in the in the left apex peripheral zone.  There was benign prostatic tissue in 2 of 2 core needle biopsy samples from the left base and left mid gland.  Patient underwent  robotic-assisted laparoscopic radical prostatectomy and right pelvic lymphadenectomy 12/05/2024, that revealed a 3.9 cm, [place] 3+4=7 acinar adenocarcinoma involving 31-40% of the prostate.  There was focal extraprostatic extension involving the right lateral prostate.  There was perineural, but not lymphovascular invasion.  There was no urinary bladder neck or seminal vesicle invasion.  There was multifocal involvement of the right lateral, left apical, and left lateral surgical margins with invasive carcinoma.  Two of 5 right pelvic lymph nodes were positive for metastases with the largest focus of metastasis measuring 0.5 cm, without extranodal extension.  Left pelvic lymph node dissection was not performed due to a large left inguinal hernia.  Left inguinal hernia repair was performed 01/31/2025. Germline cancer gene mutation panel ([facility]) 03/17/2025, was negative for pathogenic variants. 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  Microsatellite instability status was MS-stable.  Tumor mutational burden was low at 4 Muts/Mb. gLOH was low at 4%.  Restaging CT chest, abdomen, pelvis 01/10/2025, was negative for adenopathy or metastases.  There were postsurgical changes related to prior prostatectomy and right pelvic lymph node dissection.  There were collections of mild redness enhancing collections on either side of the vesicourethral anastomosis measuring 1.3 x 2.1 x 1.4 cm on the right, and 5 x 1.9 x 1.6 cm on the left likely representing postoperative seromas.  There was a left indirect inguinal hernia containing fat and sigmoid colon.  There were chronic posttraumatic osseous deformities and heterotopic ossification throughout the pelvis with surgical hardware.  There was chronic posterior lateral right rib fracture deformities with multifocal intercostal osseous bridging. Bone scan  01/10/2025, was negative for metastases. There was multilevel degenerative uptake in the spine with bridging osteophytes corresponding to CT findings, heterogeneous uptake in the pelvic bones corresponding to postoperative change and new bone formation corresponding to CT findings, and bridging ossification of multiple right lateral ribs. There was significant degenerative uptake in the shoulders, left wrist, and bilateral knees.     Patient is recovered from prostatectomy and hernia repair surgery.  Patient will receive long-term androgen deprivation therapy (ADT) consisting of leuprolide 22.5 mg every 3 months and abiraterone 1000 mg daily on an empty stomach plus prednisone 5 mg daily with breakfast x2 years beginning 03/21/2025, concurrent with adjuvant radiation therapy to the prostate bed and pelvic lymph nodes.  The urinary incontinence is gradually improving.  There are no other new symptoms since the last clinic visit 03/21/2025. Patient feels well and denies fever, anorexia, weight loss, headache, cough, dyspnea, chest pain, abdominal pain, nausea, vomiting, constipation, diarrhea, bleeding, or bone pain.     PAST HISTORY: Past history was reviewed and unchanged from the clinic note 12/30/2024.     MEDICATIONS:   Current Outpatient Medications   Medication Sig Dispense Refill    abiraterone (ZYTIGA) 250 MG tablet TAKE 4 TABLETS DAILY. TAKE ON EMPTY STOMACH 120 tablet 0    aspirin 81 MG EC tablet Take 1 tablet (81 mg) by mouth daily 90 tablet 3    atorvastatin (LIPITOR) 10 MG tablet TAKE 1 TABLET BY MOUTH EVERY DAY 90 tablet 3    Docusate Calcium (STOOL SOFTENER PO) Take by mouth as needed.      lisinopril-hydrochlorothiazide (ZESTORETIC) 20-25 MG tablet Take 1 tablet by mouth daily 90 tablet 3    predniSONE (DELTASONE) 5 MG tablet Take 1 tablet (5 mg) by mouth daily (with breakfast). 30 tablet 0    senna-docusate (SENOKOT-S/PERICOLACE) 8.6-50 MG tablet Take 1 tablet by mouth daily. 30 tablet 0    tadalafil  "(CIALIS) 5 MG tablet Take 1 tablet (5 mg) by mouth every 24 hours. 30 tablet 3    oxyCODONE (ROXICODONE) 5 MG tablet Take 1-2 tablets (5-10 mg) by mouth every 4 hours as needed for moderate to severe pain. (Patient not taking: Reported on 5/5/2025) 10 tablet 0    senna-docusate (SENOKOT-S/PERICOLACE) 8.6-50 MG tablet Take 1-2 tablets by mouth 2 times daily. (Patient not taking: Reported on 5/5/2025) 15 tablet 0       REVIEW OF SYSTEMS: Review of systems reviewed with the patient and otherwise negative except for those detailed above.    PHYSICAL EXAM: /81   Pulse 76   Temp 97.8  F (36.6  C) (Oral)   Resp 16   Ht 1.702 m (5' 7.01\")   Wt 86.9 kg (191 lb 9.6 oz)   SpO2 96%   BMI 30.00 kg/m    ECOG performance status: 0. Physical exam was unchanged from prior clinic visit 03/21/2025.  Skin: No erythema or rash.  HEENT: Sclera nonicteric. Oropharynx without lesions or ulceration, mucosa pink and moist.  Nodes: No cervical, supraclavicular, axillary, or inguinal adenopathy.  Lungs: No dullness to percussion.  No rales, wheezes, rhonchi.  Heart: Regular rate and rhythm.  Abdomen: Bowel sounds present.  Soft, nontender, no hepatosplenomegaly or mass.  Extremities: No edema.     LABORATORY REVIEWED:  Component      Latest Ref Rn 2/7/2025  12:41 PM 4/21/2025  10:12 AM 5/5/2025  7:55 AM   WBC      4.0 - 11.0 10e3/uL  4.7  5.1    RBC Count      4.40 - 5.90 10e6/uL  4.70  4.28 (L)    Hemoglobin      13.3 - 17.7 g/dL  13.5  12.3 (L)    Hematocrit      40.0 - 53.0 %  39.6 (L)  36.0 (L)    MCV      78 - 100 fL  84  84    MCH      26.5 - 33.0 pg  28.7  28.7    MCHC      31.5 - 36.5 g/dL  34.1  34.2    RDW      10.0 - 15.0 %  14.1  14.2    Platelet Count      150 - 450 10e3/uL  233  239    % Neutrophils      %  55  53    % Lymphocytes      %  28  30    % Monocytes      %  13  13    % Eosinophils      %  3  4    % Basophils      %  0  0    % Immature Granulocytes      %  0  1    NRBC/W      <1 /100  0  0    Absolute " Neutrophil      1.6 - 8.3 10e3/uL  2.6  2.7    Absolute Lymphocytes      0.8 - 5.3 10e3/uL  1.3  1.5    Absolute Monocytes      0.0 - 1.3 10e3/uL  0.6  0.6    Absolute Eosinophils      0.0 - 0.7 10e3/uL  0.2  0.2    Absolute Basophils      0.0 - 0.2 10e3/uL  0.0  0.0    Absolute Immature Granulocytes      <=0.4 10e3/uL  0.0  0.0    Absolute NRBCs      10e3/uL  0.0  0.0    Sodium      135 - 145 mmol/L  136  139    Potassium      3.4 - 5.3 mmol/L  3.5  3.4    Carbon Dioxide (CO2)      22 - 29 mmol/L  27  30 (H)    Anion Gap      7 - 15 mmol/L  10  8    Urea Nitrogen      8.0 - 23.0 mg/dL  22.8  18.3    Creatinine      0.67 - 1.17 mg/dL  0.80  0.76    GFR Estimate      >60 mL/min/1.73m2  >90  >90    Calcium      8.8 - 10.4 mg/dL  9.6  9.3    Chloride      98 - 107 mmol/L  99  101    Glucose      70 - 99 mg/dL  105 (H)  99    Alkaline Phosphatase      40 - 150 U/L  70  73    AST      0 - 45 U/L  15  15    ALT      0 - 70 U/L  14  15    Protein Total      6.4 - 8.3 g/dL  7.1  6.9    Albumin      3.5 - 5.2 g/dL  4.5  4.1    Bilirubin Total      <=1.2 mg/dL  0.6  0.5    PSA Tumor Marker      0.00 - 6.50 ng/mL 0.02  <0.01  <0.01    Testosterone Total      240 - 950 ng/dL  <2 (L)       Testosterone pending.    IMPRESSION/PLAN:.Stage GINI adenocarcinoma of the prostate.  Prostate cancer is a 3.9 cm, [place] 3+4=7 acinar adenocarcinoma involving 31-40% of the prostate with focal extraprostatic extension, multifocal involvement of the surgical margins, and metastases to 2 of 5 right pelvic lymph nodes without extranodal extension. Patient has stage IV prostate cancer and germline cancer gene mutation panel ([facility]), and somatic next generation sequencing ([facility]) on the prostatectomy sample (12/05/2024) is pending to evaluate for mutations and biomarkers that can direct effective therapies.   Patient underwent definitive surgery (12/05/2024).  Left pelvic lymph node dissection was not performed due to the large left inguinal  hernia.  Patient underwent left inguinal herniorrhaphy (01/31/2025).  Due to the high likelihood of left pelvic lymph node metastases, patient will receive long-term ADT consisting of leuprolide 22.5 mg every 3 months and abiraterone 1000 mg daily on an empty stomach plus prednisone 5 mg daily with breakfast x2 years beginning 03/21/2025, concurrent with adjuvant radiation therapy to the prostate bed and pelvic lymph nodes.  There is grade anemia.  Abiraterone/leuprolide will be continued without modification.  I reviewed the risk and side effects of abiraterone with the patient, which is associated with fatigue, hot flashes, fever, cognitive difficulty, depression, rash, abdominal pain, nausea, vomiting, diarrhea, constipation, myalgias, arthralgias, edema, falls, fractures, adrenal insufficiency, hypertension, arrhythmia, myocardial infarction, heart failure, hepatotoxicity, hyperlipidemia, anemia.  Leuprolide is associated with fatigue, hot flashes, weakness, loss of muscle mass, weight gain, forgetfulness, depression, irritability breast enlargement, loss of libido, impotence, joint stiffness and pain, coronary artery disease, osteoporosis, and bone fracture.  Patient understood the indication and risks and agreed to continue therapy.  Patient will return to clinic in 7 weeks with CBC, metabolic panel, PSA, and for the next leuprolide injection.  The current and past history, clinical evaluation, reviewing diagnostic tests with the patient, and assessment and planning occurred over 30 minutes.       Pascual Whelan MD    cc: MD Garth Diehl MD Clayton Chen, MD

## 2025-05-05 ENCOUNTER — APPOINTMENT (OUTPATIENT)
Dept: RADIATION ONCOLOGY | Facility: CLINIC | Age: 70
End: 2025-05-05
Attending: UROLOGY
Payer: COMMERCIAL

## 2025-05-05 ENCOUNTER — ONCOLOGY VISIT (OUTPATIENT)
Dept: ONCOLOGY | Facility: CLINIC | Age: 70
End: 2025-05-05
Attending: INTERNAL MEDICINE
Payer: COMMERCIAL

## 2025-05-05 ENCOUNTER — OFFICE VISIT (OUTPATIENT)
Dept: FAMILY MEDICINE | Facility: CLINIC | Age: 70
End: 2025-05-05
Payer: COMMERCIAL

## 2025-05-05 VITALS
OXYGEN SATURATION: 100 % | HEIGHT: 67 IN | SYSTOLIC BLOOD PRESSURE: 108 MMHG | DIASTOLIC BLOOD PRESSURE: 77 MMHG | BODY MASS INDEX: 30.17 KG/M2 | TEMPERATURE: 98 F | WEIGHT: 192.2 LBS | HEART RATE: 86 BPM | RESPIRATION RATE: 16 BRPM

## 2025-05-05 VITALS
WEIGHT: 191.6 LBS | SYSTOLIC BLOOD PRESSURE: 121 MMHG | HEIGHT: 67 IN | TEMPERATURE: 97.8 F | OXYGEN SATURATION: 96 % | HEART RATE: 76 BPM | DIASTOLIC BLOOD PRESSURE: 81 MMHG | BODY MASS INDEX: 30.07 KG/M2 | RESPIRATION RATE: 16 BRPM

## 2025-05-05 DIAGNOSIS — C61 PROSTATE CANCER (H): ICD-10-CM

## 2025-05-05 DIAGNOSIS — Z72.0 TOBACCO ABUSE: ICD-10-CM

## 2025-05-05 DIAGNOSIS — E78.5 HYPERLIPIDEMIA WITH TARGET LDL LESS THAN 100: ICD-10-CM

## 2025-05-05 DIAGNOSIS — C77.5 METASTASIS TO ILIAC LYMPH NODE (H): ICD-10-CM

## 2025-05-05 DIAGNOSIS — C61 PROSTATE CANCER (H): Primary | ICD-10-CM

## 2025-05-05 DIAGNOSIS — Z00.00 ENCOUNTER FOR ANNUAL PHYSICAL EXAM: Primary | ICD-10-CM

## 2025-05-05 DIAGNOSIS — I10 HYPERTENSION GOAL BP (BLOOD PRESSURE) < 140/90: ICD-10-CM

## 2025-05-05 DIAGNOSIS — Z12.11 SCREEN FOR COLON CANCER: ICD-10-CM

## 2025-05-05 LAB
ALBUMIN SERPL BCG-MCNC: 4.1 G/DL (ref 3.5–5.2)
ALP SERPL-CCNC: 73 U/L (ref 40–150)
ALT SERPL W P-5'-P-CCNC: 15 U/L (ref 0–70)
ANION GAP SERPL CALCULATED.3IONS-SCNC: 8 MMOL/L (ref 7–15)
AST SERPL W P-5'-P-CCNC: 15 U/L (ref 0–45)
BASOPHILS # BLD AUTO: 0 10E3/UL (ref 0–0.2)
BASOPHILS NFR BLD AUTO: 0 %
BILIRUB SERPL-MCNC: 0.5 MG/DL
BUN SERPL-MCNC: 18.3 MG/DL (ref 8–23)
CALCIUM SERPL-MCNC: 9.3 MG/DL (ref 8.8–10.4)
CHLORIDE SERPL-SCNC: 101 MMOL/L (ref 98–107)
CHOLEST SERPL-MCNC: 168 MG/DL
CREAT SERPL-MCNC: 0.76 MG/DL (ref 0.67–1.17)
EGFRCR SERPLBLD CKD-EPI 2021: >90 ML/MIN/1.73M2
EOSINOPHIL # BLD AUTO: 0.2 10E3/UL (ref 0–0.7)
EOSINOPHIL NFR BLD AUTO: 4 %
ERYTHROCYTE [DISTWIDTH] IN BLOOD BY AUTOMATED COUNT: 14.2 % (ref 10–15)
GLUCOSE SERPL-MCNC: 99 MG/DL (ref 70–99)
HCO3 SERPL-SCNC: 30 MMOL/L (ref 22–29)
HCT VFR BLD AUTO: 36 % (ref 40–53)
HDLC SERPL-MCNC: 62 MG/DL
HGB BLD-MCNC: 12.3 G/DL (ref 13.3–17.7)
IMM GRANULOCYTES # BLD: 0 10E3/UL
IMM GRANULOCYTES NFR BLD: 1 %
LDLC SERPL CALC-MCNC: 94 MG/DL
LYMPHOCYTES # BLD AUTO: 1.5 10E3/UL (ref 0.8–5.3)
LYMPHOCYTES NFR BLD AUTO: 30 %
MCH RBC QN AUTO: 28.7 PG (ref 26.5–33)
MCHC RBC AUTO-ENTMCNC: 34.2 G/DL (ref 31.5–36.5)
MCV RBC AUTO: 84 FL (ref 78–100)
MONOCYTES # BLD AUTO: 0.6 10E3/UL (ref 0–1.3)
MONOCYTES NFR BLD AUTO: 13 %
NEUTROPHILS # BLD AUTO: 2.7 10E3/UL (ref 1.6–8.3)
NEUTROPHILS NFR BLD AUTO: 53 %
NONHDLC SERPL-MCNC: 106 MG/DL
NRBC # BLD AUTO: 0 10E3/UL
NRBC BLD AUTO-RTO: 0 /100
PLATELET # BLD AUTO: 239 10E3/UL (ref 150–450)
POTASSIUM SERPL-SCNC: 3.4 MMOL/L (ref 3.4–5.3)
PROT SERPL-MCNC: 6.9 G/DL (ref 6.4–8.3)
PSA SERPL DL<=0.01 NG/ML-MCNC: <0.01 NG/ML (ref 0–6.5)
RBC # BLD AUTO: 4.28 10E6/UL (ref 4.4–5.9)
SODIUM SERPL-SCNC: 139 MMOL/L (ref 135–145)
TRIGL SERPL-MCNC: 60 MG/DL
WBC # BLD AUTO: 5.1 10E3/UL (ref 4–11)

## 2025-05-05 PROCEDURE — 84153 ASSAY OF PSA TOTAL: CPT | Performed by: INTERNAL MEDICINE

## 2025-05-05 PROCEDURE — 3074F SYST BP LT 130 MM HG: CPT | Performed by: FAMILY MEDICINE

## 2025-05-05 PROCEDURE — 90480 ADMN SARSCOV2 VAC 1/ONLY CMP: CPT | Performed by: FAMILY MEDICINE

## 2025-05-05 PROCEDURE — 77338 DESIGN MLC DEVICE FOR IMRT: CPT | Performed by: RADIOLOGY

## 2025-05-05 PROCEDURE — 80061 LIPID PANEL: CPT | Performed by: INTERNAL MEDICINE

## 2025-05-05 PROCEDURE — 77300 RADIATION THERAPY DOSE PLAN: CPT | Performed by: RADIOLOGY

## 2025-05-05 PROCEDURE — 82310 ASSAY OF CALCIUM: CPT | Performed by: INTERNAL MEDICINE

## 2025-05-05 PROCEDURE — 99213 OFFICE O/P EST LOW 20 MIN: CPT | Performed by: INTERNAL MEDICINE

## 2025-05-05 PROCEDURE — 77338 DESIGN MLC DEVICE FOR IMRT: CPT | Mod: 26 | Performed by: RADIOLOGY

## 2025-05-05 PROCEDURE — 99397 PER PM REEVAL EST PAT 65+ YR: CPT | Performed by: FAMILY MEDICINE

## 2025-05-05 PROCEDURE — 77300 RADIATION THERAPY DOSE PLAN: CPT | Mod: 26 | Performed by: RADIOLOGY

## 2025-05-05 PROCEDURE — 84403 ASSAY OF TOTAL TESTOSTERONE: CPT | Performed by: INTERNAL MEDICINE

## 2025-05-05 PROCEDURE — 99214 OFFICE O/P EST MOD 30 MIN: CPT | Performed by: INTERNAL MEDICINE

## 2025-05-05 PROCEDURE — 36415 COLL VENOUS BLD VENIPUNCTURE: CPT | Performed by: INTERNAL MEDICINE

## 2025-05-05 PROCEDURE — 3078F DIAST BP <80 MM HG: CPT | Performed by: FAMILY MEDICINE

## 2025-05-05 PROCEDURE — 1126F AMNT PAIN NOTED NONE PRSNT: CPT | Performed by: FAMILY MEDICINE

## 2025-05-05 PROCEDURE — 85025 COMPLETE CBC W/AUTO DIFF WBC: CPT | Performed by: INTERNAL MEDICINE

## 2025-05-05 PROCEDURE — 91320 SARSCV2 VAC 30MCG TRS-SUC IM: CPT | Performed by: FAMILY MEDICINE

## 2025-05-05 PROCEDURE — 77385 HC IMRT TREATMENT DELIVERY, SIMPLE: CPT | Performed by: STUDENT IN AN ORGANIZED HEALTH CARE EDUCATION/TRAINING PROGRAM

## 2025-05-05 RX ORDER — LISINOPRIL AND HYDROCHLOROTHIAZIDE 20; 25 MG/1; MG/1
1 TABLET ORAL DAILY
Qty: 90 TABLET | Refills: 3 | Status: SHIPPED | OUTPATIENT
Start: 2025-05-05

## 2025-05-05 RX ORDER — ATORVASTATIN CALCIUM 10 MG/1
10 TABLET, FILM COATED ORAL DAILY
Qty: 90 TABLET | Refills: 3 | Status: SHIPPED | OUTPATIENT
Start: 2025-05-05

## 2025-05-05 ASSESSMENT — PAIN SCALES - GENERAL
PAINLEVEL_OUTOF10: NO PAIN (0)
PAINLEVEL_OUTOF10: NO PAIN (0)

## 2025-05-05 NOTE — LETTER
5/5/2025      Edd Bazan  [address]  [facility] [address]      Dear Colleague,    Thank you for referring your patient, Edd Bazan, to the [facility]. Please see a copy of my visit note below.      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  Patient presents with an elevated PSA of 24.3 ng/mL (07/01/2024), PSA 31.59 (07/29/2024).  MRI prostate 08/08/2024, revealed the prostate measuring 6.4 x 5.8 x 3.7 cm with a volume of 71 g.  There was a 14 mm T2 heterogeneous lesion in the 6:00 to 7:00 position in the right apex peripheral zone with 6/15 mm capsular abutment and capsular irregularity (PI-RADS 4) designated as lesion 1.  There was a 10 mm, T2 heterogeneous lesion in the 4:00 to 5:00 position in the left apex peripheral zone with 6/15 mm capsular abutment (PI-RADS 4) designated as lesion 2.  The left neurovascular bundle was abutted by lesion 1.  There was no seminal vesicle involvement.  There was no lymphadenopathy.  There were areas of increased enhancement along the inferior pubic ramus.  [facility] MRI-ultrasound-guided prostate core needle biopsy 10/04/2024, revealed a Robin 3+4=7 acinar adenocarcinoma with perineural invasion involving 50% of 2 of 2 biopsy samples with perineural invasion from the right mid gland, 56% of 2 of 2 biopsy samples with perineural invasion from the right apex, and 78 % of 2 of 2 biopsy samples with perineural invasion from lesion 1 in the right apex peripheral zone.  There was a Robin 3+3=6 acinar adenocarcinoma without perineural invasion involving 4% of 1 of 2 core needle biopsy samples from the left apex, and 33% of 2 of 2 biopsy samples with perineural invasion from the right base.  There was atypical  small acinar proliferation (ASAP) in 2 of 2 core needle biopsy samples from lesion 2 in the in the left apex peripheral zone.  There was benign prostatic tissue in 2 of 2 core needle biopsy samples from the left base and left mid gland.  Patient underwent robotic-assisted laparoscopic radical prostatectomy and right pelvic lymphadenectomy 12/05/2024, that revealed a 3.9 cm, Robin 3+4=7 acinar adenocarcinoma involving 31-40% of the prostate.  There was focal extraprostatic extension involving the right lateral prostate.  There was perineural, but not lymphovascular invasion.  There was no urinary bladder neck or seminal vesicle invasion.  There was multifocal involvement of the right lateral, left apical, and left lateral surgical margins with invasive carcinoma.  Two of 5 right pelvic lymph nodes were positive for metastases with the largest focus of metastasis measuring 0.5 cm, without extranodal extension.  Left pelvic lymph node dissection was not performed due to a large left inguinal hernia.  Left inguinal hernia repair was performed 01/31/2025. Germline cancer gene mutation panel ([facility]) 03/17/2025, was negative for pathogenic variants. 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  Microsatellite instability status was MS-stable.  Tumor mutational burden was low at 4 Muts/Mb. gLOH was low at 4%.  Restaging CT chest, abdomen, pelvis 01/10/2025, was negative for adenopathy or metastases.  There were postsurgical changes related to prior prostatectomy and right pelvic lymph node dissection.  There were collections of mild redness enhancing collections on either side of the vesicourethral anastomosis measuring 1.3 x 2.1 x 1.4 cm on the right, and 5 x 1.9 x 1.6 cm on the left likely representing postoperative seromas.  There was a left indirect inguinal hernia containing fat and sigmoid  colon.  There were chronic posttraumatic osseous deformities and heterotopic ossification throughout the pelvis with surgical hardware.  There was chronic posterior lateral right rib fracture deformities with multifocal intercostal osseous bridging. Bone scan 01/10/2025, was negative for metastases. There was multilevel degenerative uptake in the spine with bridging osteophytes corresponding to CT findings, heterogeneous uptake in the pelvic bones corresponding to postoperative change and new bone formation corresponding to CT findings, and bridging ossification of multiple right lateral ribs. There was significant degenerative uptake in the shoulders, left wrist, and bilateral knees.     Patient is recovered from prostatectomy and hernia repair surgery.  Patient will receive long-term androgen deprivation therapy (ADT) consisting of leuprolide 22.5 mg every 3 months and abiraterone 1000 mg daily on an empty stomach plus prednisone 5 mg daily with breakfast x2 years beginning 03/21/2025, concurrent with adjuvant radiation therapy to the prostate bed and pelvic lymph nodes.  The urinary incontinence is gradually improving.  There are no other new symptoms since the last clinic visit 03/21/2025. Patient feels well and denies fever, anorexia, weight loss, headache, cough, dyspnea, chest pain, abdominal pain, nausea, vomiting, constipation, diarrhea, bleeding, or bone pain.     PAST HISTORY: Past history was reviewed and unchanged from the clinic note 12/30/2024.     MEDICATIONS:   Current Outpatient Medications   Medication Sig Dispense Refill     abiraterone (ZYTIGA) 250 MG tablet TAKE 4 TABLETS DAILY. TAKE ON EMPTY STOMACH 120 tablet 0     aspirin 81 MG EC tablet Take 1 tablet (81 mg) by mouth daily 90 tablet 3     atorvastatin (LIPITOR) 10 MG tablet TAKE 1 TABLET BY MOUTH EVERY DAY 90 tablet 3     Docusate Calcium (STOOL SOFTENER PO) Take by mouth as needed.       lisinopril-hydrochlorothiazide (ZESTORETIC) 20-25 MG  "tablet Take 1 tablet by mouth daily 90 tablet 3     predniSONE (DELTASONE) 5 MG tablet Take 1 tablet (5 mg) by mouth daily (with breakfast). 30 tablet 0     senna-docusate (SENOKOT-S/PERICOLACE) 8.6-50 MG tablet Take 1 tablet by mouth daily. 30 tablet 0     tadalafil (CIALIS) 5 MG tablet Take 1 tablet (5 mg) by mouth every 24 hours. 30 tablet 3     oxyCODONE (ROXICODONE) 5 MG tablet Take 1-2 tablets (5-10 mg) by mouth every 4 hours as needed for moderate to severe pain. (Patient not taking: Reported on 5/5/2025) 10 tablet 0     senna-docusate (SENOKOT-S/PERICOLACE) 8.6-50 MG tablet Take 1-2 tablets by mouth 2 times daily. (Patient not taking: Reported on 5/5/2025) 15 tablet 0       REVIEW OF SYSTEMS: Review of systems reviewed with the patient and otherwise negative except for those detailed above.    PHYSICAL EXAM: /81   Pulse 76   Temp 97.8  F (36.6  C) (Oral)   Resp 16   Ht 1.702 m (5' 7.01\")   Wt 86.9 kg (191 lb 9.6 oz)   SpO2 96%   BMI 30.00 kg/m    ECOG performance status: 0. Physical exam was unchanged from prior clinic visit 03/21/2025.  Skin: No erythema or rash.  HEENT: Sclera nonicteric. Oropharynx without lesions or ulceration, mucosa pink and moist.  Nodes: No cervical, supraclavicular, axillary, or inguinal adenopathy.  Lungs: No dullness to percussion.  No rales, wheezes, rhonchi.  Heart: Regular rate and rhythm.  Abdomen: Bowel sounds present.  Soft, nontender, no hepatosplenomegaly or mass.  Extremities: No edema.     LABORATORY REVIEWED:  Component      Latest Ref Rng 2/7/2025  12:41 PM 4/21/2025  10:12 AM 5/5/2025  7:55 AM   WBC      4.0 - 11.0 10e3/uL  4.7  5.1    RBC Count      4.40 - 5.90 10e6/uL  4.70  4.28 (L)    Hemoglobin      13.3 - 17.7 g/dL  13.5  12.3 (L)    Hematocrit      40.0 - 53.0 %  39.6 (L)  36.0 (L)    MCV      78 - 100 fL  84  84    MCH      26.5 - 33.0 pg  28.7  28.7    MCHC      31.5 - 36.5 g/dL  34.1  34.2    RDW      10.0 - 15.0 %  14.1  14.2    Platelet " Count      150 - 450 10e3/uL  233  239    % Neutrophils      %  55  53    % Lymphocytes      %  28  30    % Monocytes      %  13  13    % Eosinophils      %  3  4    % Basophils      %  0  0    % Immature Granulocytes      %  0  1    NRBC/W      <1 /100  0  0    Absolute Neutrophil      1.6 - 8.3 10e3/uL  2.6  2.7    Absolute Lymphocytes      0.8 - 5.3 10e3/uL  1.3  1.5    Absolute Monocytes      0.0 - 1.3 10e3/uL  0.6  0.6    Absolute Eosinophils      0.0 - 0.7 10e3/uL  0.2  0.2    Absolute Basophils      0.0 - 0.2 10e3/uL  0.0  0.0    Absolute Immature Granulocytes      <=0.4 10e3/uL  0.0  0.0    Absolute NRBCs      10e3/uL  0.0  0.0    Sodium      135 - 145 mmol/L  136  139    Potassium      3.4 - 5.3 mmol/L  3.5  3.4    Carbon Dioxide (CO2)      22 - 29 mmol/L  27  30 (H)    Anion Gap      7 - 15 mmol/L  10  8    Urea Nitrogen      8.0 - 23.0 mg/dL  22.8  18.3    Creatinine      0.67 - 1.17 mg/dL  0.80  0.76    GFR Estimate      >60 mL/min/1.73m2  >90  >90    Calcium      8.8 - 10.4 mg/dL  9.6  9.3    Chloride      98 - 107 mmol/L  99  101    Glucose      70 - 99 mg/dL  105 (H)  99    Alkaline Phosphatase      40 - 150 U/L  70  73    AST      0 - 45 U/L  15  15    ALT      0 - 70 U/L  14  15    Protein Total      6.4 - 8.3 g/dL  7.1  6.9    Albumin      3.5 - 5.2 g/dL  4.5  4.1    Bilirubin Total      <=1.2 mg/dL  0.6  0.5    PSA Tumor Marker      0.00 - 6.50 ng/mL 0.02  <0.01  <0.01    Testosterone Total      240 - 950 ng/dL  <2 (L)       Testosterone pending.    IMPRESSION/PLAN:.Stage GINI adenocarcinoma of the prostate.  Prostate cancer is a 3.9 cm, [place] 3+4=7 acinar adenocarcinoma involving 31-40% of the prostate with focal extraprostatic extension, multifocal involvement of the surgical margins, and metastases to 2 of 5 right pelvic lymph nodes without extranodal extension. Patient has stage IV prostate cancer and germline cancer gene mutation panel ([facility]), and somatic next generation sequencing  ([facility]) on the prostatectomy sample (12/05/2024) is pending to evaluate for mutations and biomarkers that can direct effective therapies.   Patient underwent definitive surgery (12/05/2024).  Left pelvic lymph node dissection was not performed due to the large left inguinal hernia.  Patient underwent left inguinal herniorrhaphy (01/31/2025).  Due to the high likelihood of left pelvic lymph node metastases, patient will receive long-term ADT consisting of leuprolide 22.5 mg every 3 months and abiraterone 1000 mg daily on an empty stomach plus prednisone 5 mg daily with breakfast x2 years beginning 03/21/2025, concurrent with adjuvant radiation therapy to the prostate bed and pelvic lymph nodes.  There is grade anemia.  Abiraterone/leuprolide will be continued without modification.  I reviewed the risk and side effects of abiraterone with the patient, which is associated with fatigue, hot flashes, fever, cognitive difficulty, depression, rash, abdominal pain, nausea, vomiting, diarrhea, constipation, myalgias, arthralgias, edema, falls, fractures, adrenal insufficiency, hypertension, arrhythmia, myocardial infarction, heart failure, hepatotoxicity, hyperlipidemia, anemia.  Leuprolide is associated with fatigue, hot flashes, weakness, loss of muscle mass, weight gain, forgetfulness, depression, irritability breast enlargement, loss of libido, impotence, joint stiffness and pain, coronary artery disease, osteoporosis, and bone fracture.  Patient understood the indication and risks and agreed to continue therapy.  Patient will return to clinic in 7 weeks with CBC, metabolic panel, PSA, and for the next leuprolide injection.  The current and past history, clinical evaluation, reviewing diagnostic tests with the patient, and assessment and planning occurred over 30 minutes.       Pascual Whelan MD    cc: MD Garth Diehl MD Clayton Chen, MD      Again, thank you for allowing me to participate in the care of  your patient.        Sincerely,        Pascual Whelan MD    Electronically signed

## 2025-05-05 NOTE — NURSING NOTE
Chief Complaint   Patient presents with    Oncology Clinic Visit     prostate cancer    Blood Draw     Vitals, blood drawn via VPT by LPN. Pt checked into appt.      MINERVA Bowles LPN

## 2025-05-05 NOTE — PROGRESS NOTES
"Preventive Care Visit  [facility]  Palma Zuleta MD, Family Medicine  May 5, 2025      Assessment & Plan     Encounter for annual physical exam   Discussed diet, exercise, wellness and other preventive recommendations related to health maintenance.   Follow up as needed for acute issues.    Physical exam recommended in one year.     Up dated his vaccines.    Hyperlipidemia with target LDL less than 100  Discussed diet modifications,  - Lipid panel reflex to direct LDL Fasting; Future  - atorvastatin (LIPITOR) 10 MG tablet; Take 1 tablet (10 mg) by mouth daily.    Hypertension goal BP (blood pressure) < 140/90    - lisinopril-hydrochlorothiazide (ZESTORETIC) 20-25 MG tablet; Take 1 tablet by mouth daily.    Prostate cancer (H)  S/P Prostatectomy,  Current on radiation   Follow up with Urology.    Screen for colon cancer  Due next year  Last Colonoscopy, 2021    Tobacco abuse  Smoke cigar occasionally,     BMI  Estimated body mass index is 30.1 kg/m  as calculated from the following:    Height as of this encounter: 1.702 m (5' 7\").    Weight as of this encounter: 87.2 kg (192 lb 3.2 oz).   Weight management plan: Discussed healthy diet and exercise guidelines      Follow-up    Follow-up Visit   Expected date:  May 05, 2026 (Approximate)      Follow Up Appointment Details:     Follow-up with whom?: PCP    Follow-Up for what?: Adult Preventive    How?: In Person             Follow-up Visit   Expected date:  May 05, 2026 (Approximate)      Follow Up Appointment Details:     Follow-up with whom?: PCP    Follow-Up for what?: Medicare Wellness    Welcome or Annual?: Annual Wellness    How?: In Person                 Maty Puga is a 70 year old, presenting for the following:  Physical    History of prostate cancer, status post prostatectomy.  Currently, he is doing radiation, in addition, hormonal therapy.    He remains active, continue to work full-time.        5/5/2025     2:11 PM "   Additional Questions   Roomed by Sharlene GASCA ma          [facility]       Advance Care Planning    Discussed advance care planning with patient; however, patient declined at this time.        7/1/2024   General Health   How would you rate your overall physical health? Good   Feel stress (tense, anxious, or unable to sleep) Not at all         7/1/2024   Nutrition   Diet: Breakfast skipped         7/1/2024   Exercise   Days per week of moderate/strenous exercise 4 days         7/1/2024   Social Factors   Frequency of gathering with friends or relatives Once a week   Worry food won't last until get money to buy more No   Food not last or not have enough money for food? No   Do you have housing? (Housing is defined as stable permanent housing and does not include staying outside in a car, in a tent, in an abandoned building, in an overnight shelter, or couch-surfing.) No   Are you worried about losing your housing? No   Lack of transportation? No   Unable to get utilities (heat,electricity)? No   Want help with housing or utility concern? No         7/1/2024   Activities of Daily Living- Home Safety   Needs help with the following daily activites None of the above   Safety concerns in the home None of the above         7/1/2024   Dental   Dentist two times every year? (!) NO         7/1/2024   Hearing Screening   Hearing concerns? None of the above           7/1/2024   Urinary Incontinence Screening   Bothered by leaking urine in past 6 months No           Today's PHQ-2 Score:       5/5/2025     2:25 PM   PHQ-2 ( 1999 [employer])   Q1: Little interest or pleasure in doing things 0   Q2: Feeling down, depressed or hopeless 0   PHQ-2 Score 0         7/1/2024   Substance Use   Alcohol more than 3/day or more than 7/wk No   Do you have a current opioid prescription? No   How severe/bad is pain from 1 to 10? 0/10 (No Pain)   Do you use any other substances recreationally? No     Social History     Tobacco Use    Smoking status: Some  Days     Types: Cigars     Passive exposure: Never    Smokeless tobacco: Never    Tobacco comments:     occ   Vaping Use    Vaping status: Never Used   Substance Use Topics    Alcohol use: Yes     Comment: a few times a week    Drug use: No       ASCVD Risk   The 10-year ASCVD risk score (Elton EDMONDS, et al., 2019) is: 22.2%    Values used to calculate the score:      Age: 70 years      Sex: Male      Is Non- : Yes      Diabetic: No      Tobacco smoker: Yes      Systolic Blood Pressure: 108 mmHg      Is BP treated: Yes      HDL Cholesterol: 54 mg/dL      Total Cholesterol: 163 mg/dL    Fracture Risk Assessment Tool  Link to Frax Calculator  Use the information below to complete the Frax calculator  : 1955  Sex: male  Weight (kg): 87.2 kg (actual weight)  Height (cm): 170.2 cm  Previous Fragility Fracture:  Yes  History of parent with fractured hip:  No  Current Smoking:  Yes  Patient has been on glucocorticoids for more than 3 months (5mg/day or more): No  Rheumatoid Arthritis on Problem List:  No  Secondary Osteoporosis on Problem List:  No  Consumes 3 or more units of alcohol per day: No  Femoral Neck BMD (g/cm2)            Reviewed and updated as needed this visit by Provider                    Past Medical History:   Diagnosis Date    History of traumatic brain injury     Hypertension     iamAFT CARE HEAL TRAUM FRAC LOWER LEG 2005    Left inguinal hernia 2025    Prostate cancer (H) 2024    Rectal fistulas     with steton, see outside report    Tobacco use      Past Surgical History:   Procedure Laterality Date    BONY PELVIS SURGERY      COLONOSCOPY N/A 2021    Procedure: COLONOSCOPY;  Surgeon: Eugene Jeronimo MD;  Location:  GI    DAVINCI PROSTATECTOMY, LYMPHADENECTOMY N/A 2024    Procedure: PROSTATECTOMY, ROBOT-ASSISTED, WITH RIGHT PELVIC LYMPHADENECTOMY;  Surgeon: Garth Vallejo MD;  Location:  OR    HERNIORRHAPHY INGUINAL Left 2025     Procedure: HERNIORRHAPHY, INGUINAL, OPEN;  Surgeon: Seng Garcia MD;  Location: [facility] OR     OB History   No obstetric history on file.     Current providers sharing in care for this patient include:  Patient Care Team:  No Ref-Primary, Physician as PCP - General  Palma Zuleta MD as MD (Family Medicine)  Garth Vallejo MD as MD (Urology)  Palma Zuleta MD as Assigned PCP  Garth Vallejo MD as Assigned Surgical Provider  Pascual Whelan MD as Physician (Internal Medicine-Hematology & Oncology)  Kevin Pacheco MD as Physician (Radiation Oncology)  Veronica Melchor, RN as Specialty Care Coordinator (Hematology & Oncology)  Juan F Sargent MD as MD (Surgery)  Seng Garcia MD as MD (Surgery)  Pascual Whelan MD as Assigned Cancer Care Provider    The following health maintenance items are reviewed in Epic and correct as of today:  Health Maintenance   Topic Date Due    ZOSTER IMMUNIZATION (1 of 2) Never done    RSV VACCINE (1 - Risk 60-74 years 1-dose series) Never done    COLORECTAL CANCER SCREENING  08/09/2024    COVID-19 Vaccine (6 - 2024-25 season) 09/01/2024    ANNUAL REVIEW OF HM ORDERS  07/01/2025    NICOTINE/TOBACCO CESSATION COUNSELING Q 1 YR  07/01/2025    MEDICARE ANNUAL WELLNESS VISIT  07/01/2025    LIPID  07/01/2025    FALL RISK ASSESSMENT  07/01/2025    INFLUENZA VACCINE (Season Ended) 09/01/2025    BMP  05/05/2026    DIABETES SCREENING  05/05/2028    ADVANCE CARE PLANNING  05/05/2030    DTAP/TDAP/TD IMMUNIZATION (3 - Td or Tdap) 07/08/2031    HEPATITIS C SCREENING  Completed    PHQ-2 (once per calendar year)  Completed    Pneumococcal Vaccine: 50+ Years  Completed    HPV IMMUNIZATION  Aged Out    MENINGITIS IMMUNIZATION  Aged Out    LUNG CANCER SCREENING  Discontinued    AORTIC ANEURYSM SCREENING (SYSTEM ASSIGNED)  Discontinued         Review of Systems  Constitutional, HEENT, cardiovascular, pulmonary, GI, , musculoskeletal, neuro, skin, endocrine and psych systems are  "negative, except as otherwise noted.     Objective    Exam  /77 (BP Location: Right arm, Patient Position: Sitting, Cuff Size: Adult Large)   Pulse 86   Temp 98  F (36.7  C) (Oral)   Resp 16   Ht 1.702 m (5' 7\")   Wt 87.2 kg (192 lb 3.2 oz)   SpO2 100%   BMI 30.10 kg/m     Estimated body mass index is 30.1 kg/m  as calculated from the following:    Height as of this encounter: 1.702 m (5' 7\").    Weight as of this encounter: 87.2 kg (192 lb 3.2 oz).    Physical Exam  GENERAL: alert and no distress  EYES: Eyes grossly normal to inspection, PERRL and conjunctivae and sclerae normal  HENT: ear canals and TM's normal, nose and mouth without ulcers or lesions  NECK: no adenopathy, no asymmetry, masses, or scars  RESP: lungs clear to auscultation - no rales, rhonchi or wheezes  CV: regular rate and rhythm, normal S1 S2, no S3 or S4, no murmur, click or rub, no peripheral edema  ABDOMEN: soft, nontender, no hepatosplenomegaly, no masses and bowel sounds normal  MS: no gross musculoskeletal defects noted, no edema  SKIN: no suspicious lesions or rashes  NEURO: Normal strength and tone, mentation intact and speech normal  PSYCH: mentation appears normal, affect normal/bright     Orders Placed This Encounter   Procedures    REVIEW OF HEALTH MAINTENANCE PROTOCOL ORDERS    COVID-19 12+ (PFIZER)    Lipid panel reflex to direct LDL Fasting          5/5/2025   Mini Cog   Clock Draw Score 2 Normal   3 Item Recall 3 objects recalled   Mini Cog Total Score 5             Signed Electronically by: Palma Zuleta MD    "

## 2025-05-05 NOTE — NURSING NOTE
"Oncology Rooming Note    May 5, 2025 8:04 AM   Edd Bazan is a 70 year old male who presents for:    Chief Complaint   Patient presents with    Oncology Clinic Visit     prostate cancer    Blood Draw     Vitals, blood drawn via VPT by LPN. Pt checked into appt.      Initial Vitals: /81   Pulse 76   Temp 97.8  F (36.6  C) (Oral)   Resp 16   Ht 1.702 m (5' 7.01\")   Wt 86.9 kg (191 lb 9.6 oz)   SpO2 96%   BMI 30.00 kg/m   Estimated body mass index is 30 kg/m  as calculated from the following:    Height as of this encounter: 1.702 m (5' 7.01\").    Weight as of this encounter: 86.9 kg (191 lb 9.6 oz). Body surface area is 2.03 meters squared.  No Pain (0) Comment: Data Unavailable   No LMP for male patient.  Allergies reviewed: Yes  Medications reviewed: Yes    Medications: MEDICATION REFILLS NEEDED TODAY. Provider was notified.  Pharmacy name entered into [employer]:    [id number] [place], [place] - [address] - [place], [place] - [address] [facility] [address]    Frailty Screening:   Is the patient here for a new oncology consult visit in cancer care? 2. No    PHQ9:  Did this patient require a PHQ9?: No      Clinical concerns: none. Refill - tadalafil.       Apolinar Betancur      "

## 2025-05-06 ENCOUNTER — APPOINTMENT (OUTPATIENT)
Dept: RADIATION ONCOLOGY | Facility: CLINIC | Age: 70
End: 2025-05-06
Attending: UROLOGY
Payer: COMMERCIAL

## 2025-05-06 LAB — TESTOST SERPL-MCNC: <2 NG/DL (ref 240–950)

## 2025-05-06 PROCEDURE — 77014 PR CT GUIDE FOR PLACEMENT RADIATION THERAPY FIELDS: CPT | Mod: 26 | Performed by: RADIOLOGY

## 2025-05-06 PROCEDURE — 77385 HC IMRT TREATMENT DELIVERY, SIMPLE: CPT | Performed by: RADIOLOGY

## 2025-05-07 ENCOUNTER — APPOINTMENT (OUTPATIENT)
Dept: RADIATION ONCOLOGY | Facility: CLINIC | Age: 70
End: 2025-05-07
Attending: UROLOGY
Payer: COMMERCIAL

## 2025-05-07 PROCEDURE — 77385 HC IMRT TREATMENT DELIVERY, SIMPLE: CPT | Performed by: RADIOLOGY

## 2025-05-07 PROCEDURE — 77014 PR CT GUIDE FOR PLACEMENT RADIATION THERAPY FIELDS: CPT | Mod: 26 | Performed by: RADIOLOGY

## 2025-05-08 ENCOUNTER — APPOINTMENT (OUTPATIENT)
Dept: RADIATION ONCOLOGY | Facility: CLINIC | Age: 70
End: 2025-05-08
Attending: UROLOGY
Payer: COMMERCIAL

## 2025-05-08 VITALS
DIASTOLIC BLOOD PRESSURE: 82 MMHG | BODY MASS INDEX: 30.54 KG/M2 | OXYGEN SATURATION: 100 % | HEART RATE: 63 BPM | SYSTOLIC BLOOD PRESSURE: 121 MMHG | WEIGHT: 195 LBS

## 2025-05-08 DIAGNOSIS — C61 PROSTATE CANCER (H): Primary | ICD-10-CM

## 2025-05-08 PROCEDURE — 77385 HC IMRT TREATMENT DELIVERY, SIMPLE: CPT | Performed by: RADIOLOGY
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
## 2025-05-09 ENCOUNTER — APPOINTMENT (OUTPATIENT)
Dept: RADIATION ONCOLOGY | Facility: CLINIC | Age: 70
End: 2025-05-09
Attending: UROLOGY
Payer: COMMERCIAL

## 2025-05-09 PROCEDURE — 77014 PR CT GUIDE FOR PLACEMENT RADIATION THERAPY FIELDS: CPT | Mod: 26 | Performed by: STUDENT IN AN ORGANIZED HEALTH CARE EDUCATION/TRAINING PROGRAM

## 2025-05-09 PROCEDURE — 77427 RADIATION TX MANAGEMENT X5: CPT | Performed by: STUDENT IN AN ORGANIZED HEALTH CARE EDUCATION/TRAINING PROGRAM

## 2025-05-09 PROCEDURE — 77385 HC IMRT TREATMENT DELIVERY, SIMPLE: CPT | Performed by: RADIOLOGY

## 2025-05-09 PROCEDURE — 77336 RADIATION PHYSICS CONSULT: CPT | Performed by: RADIOLOGY

## 2025-05-12 ENCOUNTER — APPOINTMENT (OUTPATIENT)
Dept: RADIATION ONCOLOGY | Facility: CLINIC | Age: 70
End: 2025-05-12
Attending: UROLOGY
Payer: COMMERCIAL

## 2025-05-12 DIAGNOSIS — C61 PROSTATE CANCER (H): ICD-10-CM

## 2025-05-12 DIAGNOSIS — C77.5 METASTASIS TO ILIAC LYMPH NODE (H): ICD-10-CM

## 2025-05-12 PROCEDURE — 77385 HC IMRT TREATMENT DELIVERY, SIMPLE: CPT | Performed by: RADIOLOGY

## 2025-05-12 PROCEDURE — 77014 PR CT GUIDE FOR PLACEMENT RADIATION THERAPY FIELDS: CPT | Mod: 26 | Performed by: RADIOLOGY

## 2025-05-12 RX ORDER — PREDNISONE 5 MG/1
5 TABLET ORAL
Qty: 90 TABLET | Refills: 3 | Status: SHIPPED | OUTPATIENT
Start: 2025-05-12

## 2025-05-12 NOTE — TELEPHONE ENCOUNTER
Medication requested: prednisone 5 mg tablet    Last prescribing provider: Pascual Whelan MD on 4/10/25    Last clinic visit date: 5/5/25 with Dr. Whelan    Recommendations for requested medication (if none, N/A): NA    Any other pertinent information (if none, N/A): NA    Refilled: Y/N, if NO, why?    Pended and Routed to  Dr. Pascual Whelan

## 2025-05-13 ENCOUNTER — APPOINTMENT (OUTPATIENT)
Dept: RADIATION ONCOLOGY | Facility: CLINIC | Age: 70
End: 2025-05-13
Attending: UROLOGY
Payer: COMMERCIAL

## 2025-05-13 PROCEDURE — 77014 PR CT GUIDE FOR PLACEMENT RADIATION THERAPY FIELDS: CPT | Mod: 26 | Performed by: RADIOLOGY

## 2025-05-13 PROCEDURE — 77385 HC IMRT TREATMENT DELIVERY, SIMPLE: CPT | Performed by: RADIOLOGY

## 2025-05-14 ENCOUNTER — APPOINTMENT (OUTPATIENT)
Dept: RADIATION ONCOLOGY | Facility: CLINIC | Age: 70
End: 2025-05-14
Attending: UROLOGY
Payer: COMMERCIAL

## 2025-05-14 PROCEDURE — 77014 PR CT GUIDE FOR PLACEMENT RADIATION THERAPY FIELDS: CPT | Mod: 26 | Performed by: RADIOLOGY

## 2025-05-14 PROCEDURE — 77385 HC IMRT TREATMENT DELIVERY, SIMPLE: CPT | Performed by: RADIOLOGY

## 2025-05-15 ENCOUNTER — APPOINTMENT (OUTPATIENT)
Dept: RADIATION ONCOLOGY | Facility: CLINIC | Age: 70
End: 2025-05-15
Attending: UROLOGY
Payer: COMMERCIAL

## 2025-05-15 VITALS
WEIGHT: 191.8 LBS | HEART RATE: 79 BPM | BODY MASS INDEX: 30.04 KG/M2 | DIASTOLIC BLOOD PRESSURE: 77 MMHG | SYSTOLIC BLOOD PRESSURE: 111 MMHG

## 2025-05-15 DIAGNOSIS — C61 PROSTATE CANCER (H): Primary | ICD-10-CM

## 2025-05-15 PROCEDURE — 77385 HC IMRT TREATMENT DELIVERY, SIMPLE: CPT | Performed by: RADIOLOGY

## 2025-05-15 NOTE — LETTER
5/15/2025      Edd Bazan  [address]  [facility] [address]      Dear Colleague,    Thank you for referring your patient, Edd Bazan, to the [facility] RADIATION ONCOLOGY. Please see a copy of my visit note below.    [facility] PHYSICIANS  SPECIALIZING IN BREAKTHROUGHS  Radiation Oncology    On Treatment Visit Note      Edd Bazan      Date: 5/15/2025   MRN: [id number]   : 1955  Diagnosis: Prostate Cancer      Reason for Visit:  On Radiation Treatment Visit     Treatment Summary to Date  Treatment Site: Pelvis Current Dose: 1800/7000 cGy Fractions:       Chemotherapy  Chemo concurrent with radx?: No    ED Visit/Hospital Admission: na    Treatment Breaks: na    Subjective:     No urinary or bowel complaints.    Nursing ROS:   Nutrition Alteration  Diet Type: Patient's Preference  Skin  Skin Reaction: 0 - No changes        Cardiovascular  Respiratory effort: 1 - Normal - without distress     Genitourinary   Note: Nocturia once a night     Pain Assessment  0-10 Pain Scale: 0    Objective:   /77   Pulse 79   Wt 87 kg (191 lb 12.8 oz)   BMI 30.04 kg/m    Gen: Appears well, in no acute distress  Skin: No erythema    Labs:  CBC RESULTS:   Recent Labs   Lab Test 25  0755   WBC 5.1   RBC 4.28*   HGB 12.3*   HCT 36.0*   MCV 84   MCH 28.7   MCHC 34.2   RDW 14.2        ELECTROLYTES:  Recent Labs   Lab Test 25  0755      POTASSIUM 3.4   CHLORIDE 101   NEGRITA 9.3   CO2 30*   BUN 18.3   CR 0.76   GLC 99       Assessment:    Tolerating radiation therapy well.  All questions and concerns addressed.    Toxicities:  None    Plan:   Continue radiation treatments as planned.    Ports/IGRT reviewed          Medication Review  Med list reviewed with patient?: Yes  Med list printed and given: Offered and declined    Educational Topic Discussed  Education Instructions: Reviewed        Kevin Pacheco MD  [phone] clinic     Again,  thank you for allowing me to participate in the care of your patient.        Sincerely,        Kevin Pacheco MD    Electronically signed

## 2025-05-15 NOTE — PROGRESS NOTES
[facility] PHYSICIANS  SPECIALIZING IN BREAKTHROUGHS  Radiation Oncology    On Treatment Visit Note      Edd Lovettgins      Date: 5/15/2025   MRN: [id number]   : 1955  Diagnosis: Prostate Cancer      Reason for Visit:  On Radiation Treatment Visit     Treatment Summary to Date  Treatment Site: Pelvis Current Dose: 1800/7000 cGy Fractions:       Chemotherapy  Chemo concurrent with radx?: No    ED Visit/Hospital Admission: na    Treatment Breaks: na    Subjective:     No urinary or bowel complaints.    Nursing ROS:   Nutrition Alteration  Diet Type: Patient's Preference  Skin  Skin Reaction: 0 - No changes        Cardiovascular  Respiratory effort: 1 - Normal - without distress     Genitourinary   Note: Nocturia once a night     Pain Assessment  0-10 Pain Scale: 0    Objective:   /77   Pulse 79   Wt 87 kg (191 lb 12.8 oz)   BMI 30.04 kg/m    Gen: Appears well, in no acute distress  Skin: No erythema    Labs:  CBC RESULTS:   Recent Labs   Lab Test 25  0755   WBC 5.1   RBC 4.28*   HGB 12.3*   HCT 36.0*   MCV 84   MCH 28.7   MCHC 34.2   RDW 14.2        ELECTROLYTES:  Recent Labs   Lab Test 25  0755      POTASSIUM 3.4   CHLORIDE 101   NEGRITA 9.3   CO2 30*   BUN 18.3   CR 0.76   GLC 99       Assessment:    Tolerating radiation therapy well.  All questions and concerns addressed.    Toxicities:  None    Plan:   Continue radiation treatments as planned.    Ports/IGRT reviewed          Medication Review  Med list reviewed with patient?: Yes  Med list printed and given: Offered and declined    Educational Topic Discussed  Education Instructions: Reviewed        Kevin Pacheco MD  [phone] [facility]

## 2025-05-16 ENCOUNTER — APPOINTMENT (OUTPATIENT)
Dept: RADIATION ONCOLOGY | Facility: CLINIC | Age: 70
End: 2025-05-16
Attending: UROLOGY
Payer: COMMERCIAL

## 2025-05-16 PROCEDURE — 77385 HC IMRT TREATMENT DELIVERY, SIMPLE: CPT | Performed by: RADIOLOGY

## 2025-05-16 PROCEDURE — 77427 RADIATION TX MANAGEMENT X5: CPT | Performed by: RADIOLOGY

## 2025-05-16 PROCEDURE — 77014 PR CT GUIDE FOR PLACEMENT RADIATION THERAPY FIELDS: CPT | Mod: 26 | Performed by: RADIOLOGY

## 2025-05-16 PROCEDURE — 77336 RADIATION PHYSICS CONSULT: CPT | Performed by: RADIOLOGY

## 2025-05-19 ENCOUNTER — APPOINTMENT (OUTPATIENT)
Dept: RADIATION ONCOLOGY | Facility: CLINIC | Age: 70
End: 2025-05-19
Attending: UROLOGY
Payer: COMMERCIAL

## 2025-05-19 PROCEDURE — 77385 HC IMRT TREATMENT DELIVERY, SIMPLE: CPT | Performed by: RADIOLOGY

## 2025-05-20 ENCOUNTER — LAB (OUTPATIENT)
Dept: LAB | Facility: CLINIC | Age: 70
End: 2025-05-20
Payer: COMMERCIAL

## 2025-05-20 ENCOUNTER — TELEPHONE (OUTPATIENT)
Dept: ONCOLOGY | Facility: CLINIC | Age: 70
End: 2025-05-20

## 2025-05-20 ENCOUNTER — APPOINTMENT (OUTPATIENT)
Dept: RADIATION ONCOLOGY | Facility: CLINIC | Age: 70
End: 2025-05-20
Attending: UROLOGY
Payer: COMMERCIAL

## 2025-05-20 DIAGNOSIS — C77.5 METASTASIS TO ILIAC LYMPH NODE (H): ICD-10-CM

## 2025-05-20 DIAGNOSIS — C61 PROSTATE CANCER (H): ICD-10-CM

## 2025-05-20 LAB
ALBUMIN SERPL BCG-MCNC: 4.1 G/DL (ref 3.5–5.2)
ALP SERPL-CCNC: 68 U/L (ref 40–150)
ALT SERPL W P-5'-P-CCNC: 53 U/L (ref 0–70)
ANION GAP SERPL CALCULATED.3IONS-SCNC: 9 MMOL/L (ref 7–15)
AST SERPL W P-5'-P-CCNC: 30 U/L (ref 0–45)
BASOPHILS # BLD AUTO: 0 10E3/UL (ref 0–0.2)
BASOPHILS NFR BLD AUTO: 0 %
BILIRUB SERPL-MCNC: 0.5 MG/DL
BUN SERPL-MCNC: 16.1 MG/DL (ref 8–23)
CALCIUM SERPL-MCNC: 9 MG/DL (ref 8.8–10.4)
CHLORIDE SERPL-SCNC: 96 MMOL/L (ref 98–107)
CREAT SERPL-MCNC: 0.76 MG/DL (ref 0.67–1.17)
EGFRCR SERPLBLD CKD-EPI 2021: >90 ML/MIN/1.73M2
EOSINOPHIL # BLD AUTO: 0.2 10E3/UL (ref 0–0.7)
EOSINOPHIL NFR BLD AUTO: 5 %
ERYTHROCYTE [DISTWIDTH] IN BLOOD BY AUTOMATED COUNT: 14.6 % (ref 10–15)
GLUCOSE SERPL-MCNC: 99 MG/DL (ref 70–99)
HCO3 SERPL-SCNC: 29 MMOL/L (ref 22–29)
HCT VFR BLD AUTO: 35.2 % (ref 40–53)
HGB BLD-MCNC: 11.8 G/DL (ref 13.3–17.7)
IMM GRANULOCYTES # BLD: 0 10E3/UL
IMM GRANULOCYTES NFR BLD: 1 %
LYMPHOCYTES # BLD AUTO: 0.5 10E3/UL (ref 0.8–5.3)
LYMPHOCYTES NFR BLD AUTO: 10 %
MCH RBC QN AUTO: 28.6 PG (ref 26.5–33)
MCHC RBC AUTO-ENTMCNC: 33.5 G/DL (ref 31.5–36.5)
MCV RBC AUTO: 85 FL (ref 78–100)
MONOCYTES # BLD AUTO: 0.7 10E3/UL (ref 0–1.3)
MONOCYTES NFR BLD AUTO: 14 %
NEUTROPHILS # BLD AUTO: 3.5 10E3/UL (ref 1.6–8.3)
NEUTROPHILS NFR BLD AUTO: 71 %
NRBC # BLD AUTO: 0 10E3/UL
NRBC BLD AUTO-RTO: 0 /100
PLATELET # BLD AUTO: 164 10E3/UL (ref 150–450)
POTASSIUM SERPL-SCNC: 3.7 MMOL/L (ref 3.4–5.3)
PROT SERPL-MCNC: 6.5 G/DL (ref 6.4–8.3)
RBC # BLD AUTO: 4.13 10E6/UL (ref 4.4–5.9)
SODIUM SERPL-SCNC: 134 MMOL/L (ref 135–145)
WBC # BLD AUTO: 4.9 10E3/UL (ref 4–11)

## 2025-05-20 PROCEDURE — 36415 COLL VENOUS BLD VENIPUNCTURE: CPT

## 2025-05-20 PROCEDURE — 77385 HC IMRT TREATMENT DELIVERY, SIMPLE: CPT | Performed by: RADIOLOGY

## 2025-05-20 PROCEDURE — 85004 AUTOMATED DIFF WBC COUNT: CPT

## 2025-05-20 PROCEDURE — 77014 PR CT GUIDE FOR PLACEMENT RADIATION THERAPY FIELDS: CPT | Mod: 26 | Performed by: RADIOLOGY

## 2025-05-20 PROCEDURE — 82310 ASSAY OF CALCIUM: CPT

## 2025-05-20 NOTE — ORAL ONC MGMT
Oral Chemotherapy Monitoring Program  Lab Follow Up    Reviewed lab results from 5/20/25.        2/25/2025     3:00 PM 3/10/2025     1:00 PM 3/10/2025     2:00 PM 3/28/2025    12:00 PM 4/7/2025    12:00 PM 4/21/2025    12:00 PM 5/20/2025    11:00 AM   ORAL CHEMOTHERAPY   Assessment Type Initial Work up New Teach  Initial Follow up Lab Monitoring Lab Monitoring Lab Monitoring   Diagnosis Code Prostate Cancer Prostate Cancer  Prostate Cancer Prostate Cancer Prostate Cancer Prostate Cancer   Providers Tip Poseyazti P[facility]   [facility]   Is this patient followed by the [facility] OC team? No No  No No No No   Drug Name Zytiga (abiraterone)  Zytiga (abiraterone)  Zytiga (abiraterone) Zytiga (abiraterone) Zytiga (abiraterone) Zytiga (abiraterone)   Dose 1,000 mg  1,000 mg  1,000 mg 1,000 mg 1,000 mg 1,000 mg   Current Schedule Daily Daily  Daily Daily Daily Daily   Cycle Details Continuous  Continuous  Continuous Continuous Continuous Continuous   Start Date of Last Cycle    3/21/2025      Planned next cycle start date 3/21/2025 3/21/2025        Doses missed in last 2 weeks    0      Adherence Assessment    Adherent      Adverse Effects    No AE identified during assessment No AE identified during assessment No AE identified during assessment    Home BPs    patient doesn't remember      Any new drug interactions? Yes  Yes No      Pharmacist Intervention? Yes  Yes       Intervention(s) Patient Education  Patient Education       Is the dose as ordered appropriate for the patient? Yes  Yes Yes Yes     Has the patient missed any days of school, work, or other routine activity?    No      Since the last time we talked, have you been hospitalized or used the emergency room?    No      Was a medication prescribed as part of a CPA? No   No No         Data saved with a previous flowsheet row definition       Labs:  _  Result Component Current Result  Ref Range   Sodium 134 (L) (5/20/2025) 135 - 145 mmol/L     _  Result Component Current Result Ref Range   Potassium 3.7 (5/20/2025) 3.4 - 5.3 mmol/L     _  Result Component Current Result Ref Range   Calcium 9.0 (5/20/2025) 8.8 - 10.4 mg/dL     No results found for Mag within last 30 days.     No results found for Phos within last 30 days.     _  Result Component Current Result Ref Range   Albumin 4.1 (5/20/2025) 3.5 - 5.2 g/dL     _  Result Component Current Result Ref Range   Urea Nitrogen 16.1 (5/20/2025) 8.0 - 23.0 mg/dL     _  Result Component Current Result Ref Range   Creatinine 0.76 (5/20/2025) 0.67 - 1.17 mg/dL     _  Result Component Current Result Ref Range   AST 30 (5/20/2025) 0 - 45 U/L     _  Result Component Current Result Ref Range   ALT 53 (5/20/2025) 0 - 70 U/L     _  Result Component Current Result Ref Range   Bilirubin Total 0.5 (5/20/2025) <=1.2 mg/dL     _  Result Component Current Result Ref Range   WBC Count 4.9 (5/20/2025) 4.0 - 11.0 10e3/uL     _  Result Component Current Result Ref Range   Hemoglobin 11.8 (L) (5/20/2025) 13.3 - 17.7 g/dL     _  Result Component Current Result Ref Range   Platelet Count 164 (5/20/2025) 150 - 450 10e3/uL     _  Result Component Current Result Ref Range   Absolute Neutrophils 3.5 (5/20/2025) 1.6 - 8.3 10e3/uL     No results found for ANC within last 30 days.        Assessment & Plan:  No concerning abnormalities. Na and Cl trending down and below LLN; patient instructed to drink water and to have a full bladder during radation. Continue to monitor.     Questions answered to patient's satisfaction.    Follow-Up:  6/2 labs and due for monthly assessment    Jadon Leary, Lenny  PGY2 Oncology Pharmacy Resident

## 2025-05-21 ENCOUNTER — APPOINTMENT (OUTPATIENT)
Dept: RADIATION ONCOLOGY | Facility: CLINIC | Age: 70
End: 2025-05-21
Attending: UROLOGY
Payer: COMMERCIAL

## 2025-05-21 PROCEDURE — 77385 HC IMRT TREATMENT DELIVERY, SIMPLE: CPT | Performed by: RADIOLOGY

## 2025-05-21 PROCEDURE — 77014 PR CT GUIDE FOR PLACEMENT RADIATION THERAPY FIELDS: CPT | Mod: 26 | Performed by: RADIOLOGY

## 2025-05-22 ENCOUNTER — OFFICE VISIT (OUTPATIENT)
Dept: RADIATION ONCOLOGY | Facility: CLINIC | Age: 70
End: 2025-05-22
Attending: UROLOGY
Payer: COMMERCIAL

## 2025-05-22 VITALS
WEIGHT: 196 LBS | DIASTOLIC BLOOD PRESSURE: 71 MMHG | HEART RATE: 85 BPM | BODY MASS INDEX: 30.7 KG/M2 | SYSTOLIC BLOOD PRESSURE: 111 MMHG

## 2025-05-22 DIAGNOSIS — C61 PROSTATE CANCER (H): Primary | ICD-10-CM

## 2025-05-22 PROCEDURE — 77385 HC IMRT TREATMENT DELIVERY, SIMPLE: CPT | Performed by: RADIOLOGY

## 2025-05-22 NOTE — LETTER
2025      Edd Bazan  [address]  [place] [facility] [address]      Dear Colleague,    Thank you for referring your patient, Edd Bazan, to the [facility] RADIATION ONCOLOGY. Please see a copy of my visit note below.    [facility] PHYSICIANS  SPECIALIZING IN BREAKTHROUGHS  Radiation Oncology    On Treatment Visit Note      Edd Bazan      Date: 2025   MRN: [id number]   : 1955  Diagnosis: Prostate Cancer      Reason for Visit:  On Radiation Treatment Visit     Treatment Summary to Date  Treatment Site: Pelvis Current Dose: 2800/7000 cGy Fractions:       Chemotherapy  Chemo concurrent with radx?: No    ED Visit/Hospital Admission: na    Treatment Breaks: na    Subjective:     No urinary complaints. He tends towards constipation at baseline. He took a little too much laxative a few days ago which resulted in diarrhea.    Nursing ROS:   Nutrition Alteration  Diet Type: Patient's Preference  Skin  Skin Reaction: 0 - No changes        Cardiovascular  Respiratory effort: 1 - Normal - without distress  Gastrointestinal  GI Note: Taking Laxatives  Genitourinary   Note: Nocturia once a night     Pain Assessment  0-10 Pain Scale: 0    Objective:   /71   Pulse 85   Wt 88.9 kg (196 lb)   BMI 30.70 kg/m    Gen: Appears well, in no acute distress  Skin: No erythema    Labs:  CBC RESULTS:   Recent Labs   Lab Test 25  0823   WBC 4.9   RBC 4.13*   HGB 11.8*   HCT 35.2*   MCV 85   MCH 28.6   MCHC 33.5   RDW 14.6        ELECTROLYTES:  Recent Labs   Lab Test 25  0823   *   POTASSIUM 3.7   CHLORIDE 96*   NEGRITA 9.0   CO2 29   BUN 16.1   CR 0.76   GLC 99       Assessment:    Tolerating radiation therapy well.  All questions and concerns addressed.    Toxicities:  None    Plan:   Continue radiation treatments as planned.    Ports/IGRT reviewed          Medication Review  Med list reviewed with patient?: Yes  Med list printed and  given: Offered and declined    Educational Topic Discussed  Education Instructions: Reviewed        Kevin Pacheco MD  [phone] clinic      Again, thank you for allowing me to participate in the care of your patient.        Sincerely,        Kevin Pacheco MD    Electronically signed

## 2025-05-22 NOTE — PROGRESS NOTES
[facility] PHYSICIANS  SPECIALIZING IN BREAKTHROUGHS  Radiation Oncology    On Treatment Visit Note      Edd Bazan      Date: 2025   MRN: [id number]   : 1955  Diagnosis: Prostate Cancer      Reason for Visit:  On Radiation Treatment Visit     Treatment Summary to Date  Treatment Site: Pelvis Current Dose: 2800/7000 cGy Fractions:       Chemotherapy  Chemo concurrent with radx?: No    ED Visit/Hospital Admission: na    Treatment Breaks: na    Subjective:     No urinary complaints. He tends towards constipation at baseline. He took a little too much laxative a few days ago which resulted in diarrhea.    Nursing ROS:   Nutrition Alteration  Diet Type: Patient's Preference  Skin  Skin Reaction: 0 - No changes        Cardiovascular  Respiratory effort: 1 - Normal - without distress  Gastrointestinal  GI Note: Taking Laxatives  Genitourinary   Note: Nocturia once a night     Pain Assessment  0-10 Pain Scale: 0    Objective:   /71   Pulse 85   Wt 88.9 kg (196 lb)   BMI 30.70 kg/m    Gen: Appears well, in no acute distress  Skin: No erythema    Labs:  CBC RESULTS:   Recent Labs   Lab Test 25  0823   WBC 4.9   RBC 4.13*   HGB 11.8*   HCT 35.2*   MCV 85   MCH 28.6   MCHC 33.5   RDW 14.6        ELECTROLYTES:  Recent Labs   Lab Test 25  0823   *   POTASSIUM 3.7   CHLORIDE 96*   NEGRITA 9.0   CO2 29   BUN 16.1   CR 0.76   GLC 99       Assessment:    Tolerating radiation therapy well.  All questions and concerns addressed.    Toxicities:  None    Plan:   Continue radiation treatments as planned.    Ports/IGRT reviewed          Medication Review  Med list reviewed with patient?: Yes  Med list printed and given: Offered and declined    Educational Topic Discussed  Education Instructions: Reviewed        Kevin Pacheco MD  [phone] [facility]

## 2025-05-23 ENCOUNTER — APPOINTMENT (OUTPATIENT)
Dept: RADIATION ONCOLOGY | Facility: CLINIC | Age: 70
End: 2025-05-23
Attending: UROLOGY
Payer: COMMERCIAL

## 2025-05-23 PROCEDURE — 77427 RADIATION TX MANAGEMENT X5: CPT | Performed by: RADIOLOGY

## 2025-05-23 PROCEDURE — 77385 HC IMRT TREATMENT DELIVERY, SIMPLE: CPT | Performed by: RADIOLOGY

## 2025-05-23 PROCEDURE — 77336 RADIATION PHYSICS CONSULT: CPT | Performed by: RADIOLOGY

## 2025-05-23 PROCEDURE — 77014 PR CT GUIDE FOR PLACEMENT RADIATION THERAPY FIELDS: CPT | Mod: 26 | Performed by: RADIOLOGY

## 2025-05-27 ENCOUNTER — APPOINTMENT (OUTPATIENT)
Dept: RADIATION ONCOLOGY | Facility: CLINIC | Age: 70
End: 2025-05-27
Attending: UROLOGY
Payer: COMMERCIAL

## 2025-05-27 DIAGNOSIS — C61 PROSTATE CANCER (H): ICD-10-CM

## 2025-05-27 DIAGNOSIS — C77.5 METASTASIS TO ILIAC LYMPH NODE (H): ICD-10-CM

## 2025-05-27 PROCEDURE — 77385 HC IMRT TREATMENT DELIVERY, SIMPLE: CPT | Performed by: RADIOLOGY

## 2025-05-27 PROCEDURE — 77014 PR CT GUIDE FOR PLACEMENT RADIATION THERAPY FIELDS: CPT | Mod: 26 | Performed by: RADIOLOGY

## 2025-05-27 RX ORDER — ABIRATERONE ACETATE 250 MG/1
TABLET ORAL
Qty: 120 TABLET | Refills: 0 | OUTPATIENT
Start: 2025-05-27

## 2025-05-28 ENCOUNTER — APPOINTMENT (OUTPATIENT)
Dept: RADIATION ONCOLOGY | Facility: CLINIC | Age: 70
End: 2025-05-28
Attending: UROLOGY
Payer: COMMERCIAL

## 2025-05-28 PROCEDURE — 77385 HC IMRT TREATMENT DELIVERY, SIMPLE: CPT | Performed by: RADIOLOGY

## 2025-05-28 PROCEDURE — 77014 PR CT GUIDE FOR PLACEMENT RADIATION THERAPY FIELDS: CPT | Mod: 26 | Performed by: RADIOLOGY

## 2025-05-29 ENCOUNTER — OFFICE VISIT (OUTPATIENT)
Dept: RADIATION ONCOLOGY | Facility: CLINIC | Age: 70
End: 2025-05-29
Attending: UROLOGY
Payer: COMMERCIAL

## 2025-05-29 VITALS
SYSTOLIC BLOOD PRESSURE: 126 MMHG | BODY MASS INDEX: 30.06 KG/M2 | DIASTOLIC BLOOD PRESSURE: 82 MMHG | WEIGHT: 191.9 LBS | HEART RATE: 78 BPM

## 2025-05-29 DIAGNOSIS — C61 PROSTATE CANCER (H): Primary | ICD-10-CM

## 2025-05-29 PROCEDURE — 77427 RADIATION TX MANAGEMENT X5: CPT | Performed by: RADIOLOGY

## 2025-05-29 PROCEDURE — 77385 HC IMRT TREATMENT DELIVERY, SIMPLE: CPT | Performed by: RADIOLOGY

## 2025-05-29 NOTE — LETTER
2025      Edd Bazan  [address]  [place] [facility] [address]      Dear Colleague,    Thank you for referring your patient, Edd Bazan, to the [facility] RADIATION ONCOLOGY. Please see a copy of my visit note below.    [facility] PHYSICIANS  SPECIALIZING IN BREAKTHROUGHS  Radiation Oncology    On Treatment Visit Note      Edd Bazan      Date: 2025   MRN: [id number]   : 1955  Diagnosis: Prostate Cancer      Reason for Visit:  On Radiation Treatment Visit     Treatment Summary to Date  Treatment Site: Pelvis Current Dose: 3600/7000 cGy Fractions:       Chemotherapy  Chemo concurrent with radx?: No    ED Visit/Hospital Admission: na    Treatment Breaks: na    Subjective:     Stools soft, but not loose.  He has stopped all laxatives.  No change in urination.    Nursing ROS:   Nutrition Alteration  Diet Type: Patient's Preference  Skin  Skin Reaction: 0 - No changes        Cardiovascular  Respiratory effort: 1 - Normal - without distress  Gastrointestinal  Diarrhea: 0 - None  GI Note: Will get Immodium  Genitourinary   Note: Nocturia once a night     Pain Assessment  0-10 Pain Scale: 0    Objective:   /82   Pulse 78   Wt 87 kg (191 lb 14.4 oz)   BMI 30.06 kg/m    Gen: Appears well, in no acute distress  Skin: No erythema    Labs:  CBC RESULTS:   Recent Labs   Lab Test 25  0823   WBC 4.9   RBC 4.13*   HGB 11.8*   HCT 35.2*   MCV 85   MCH 28.6   MCHC 33.5   RDW 14.6        ELECTROLYTES:  Recent Labs   Lab Test 25  0823   *   POTASSIUM 3.7   CHLORIDE 96*   NEGRITA 9.0   CO2 29   BUN 16.1   CR 0.76   GLC 99       Assessment:    Tolerating radiation therapy well.  All questions and concerns addressed.    Toxicities:  None    Plan:   Continue radiation treatments as planned.    Ports/IGRT reviewed  Reviewed low residue diet.          Medication Review  Med list reviewed with patient?: Yes  Med list printed and given:  Offered and declined    Educational Topic Discussed  Education Instructions: Reviewed        Kevin Pacheco MD  [phone] clinic      Again, thank you for allowing me to participate in the care of your patient.        Sincerely,        Kevin Pacheco MD    Electronically signed

## 2025-05-29 NOTE — PROGRESS NOTES
[facility] PHYSICIANS  SPECIALIZING IN BREAKTHROUGHS  Radiation Oncology    On Treatment Visit Note      Edd Bazan      Date: 2025   MRN: [id number]   : 1955  Diagnosis: Prostate Cancer      Reason for Visit:  On Radiation Treatment Visit     Treatment Summary to Date  Treatment Site: Pelvis Current Dose: 3600/7000 cGy Fractions:       Chemotherapy  Chemo concurrent with radx?: No    ED Visit/Hospital Admission: na    Treatment Breaks: na    Subjective:     Stools soft, but not loose.  He has stopped all laxatives.  No change in urination.    Nursing ROS:   Nutrition Alteration  Diet Type: Patient's Preference  Skin  Skin Reaction: 0 - No changes        Cardiovascular  Respiratory effort: 1 - Normal - without distress  Gastrointestinal  Diarrhea: 0 - None  GI Note: Will get Immodium  Genitourinary   Note: Nocturia once a night     Pain Assessment  0-10 Pain Scale: 0    Objective:   /82   Pulse 78   Wt 87 kg (191 lb 14.4 oz)   BMI 30.06 kg/m    Gen: Appears well, in no acute distress  Skin: No erythema    Labs:  CBC RESULTS:   Recent Labs   Lab Test 25  0823   WBC 4.9   RBC 4.13*   HGB 11.8*   HCT 35.2*   MCV 85   MCH 28.6   MCHC 33.5   RDW 14.6        ELECTROLYTES:  Recent Labs   Lab Test 25  0823   *   POTASSIUM 3.7   CHLORIDE 96*   NEGRITA 9.0   CO2 29   BUN 16.1   CR 0.76   GLC 99       Assessment:    Tolerating radiation therapy well.  All questions and concerns addressed.    Toxicities:  None    Plan:   Continue radiation treatments as planned.    Ports/IGRT reviewed  Reviewed low residue diet.          Medication Review  Med list reviewed with patient?: Yes  Med list printed and given: Offered and declined    Educational Topic Discussed  Education Instructions: Reviewed        Kevin Pacheco MD  [phone] clinic

## 2025-06-02 ENCOUNTER — APPOINTMENT (OUTPATIENT)
Dept: RADIATION ONCOLOGY | Facility: CLINIC | Age: 70
End: 2025-06-02
Attending: UROLOGY
Payer: COMMERCIAL

## 2025-06-02 PROCEDURE — 77385 HC IMRT TREATMENT DELIVERY, SIMPLE: CPT | Performed by: RADIOLOGY

## 2025-06-03 ENCOUNTER — TELEPHONE (OUTPATIENT)
Dept: ONCOLOGY | Facility: CLINIC | Age: 70
End: 2025-06-03
Payer: COMMERCIAL

## 2025-06-03 ENCOUNTER — APPOINTMENT (OUTPATIENT)
Dept: RADIATION ONCOLOGY | Facility: CLINIC | Age: 70
End: 2025-06-03
Attending: UROLOGY
Payer: COMMERCIAL

## 2025-06-03 PROCEDURE — 77385 HC IMRT TREATMENT DELIVERY, SIMPLE: CPT | Performed by: RADIOLOGY

## 2025-06-03 PROCEDURE — 77336 RADIATION PHYSICS CONSULT: CPT | Performed by: RADIOLOGY

## 2025-06-03 NOTE — ORAL ONC MGMT
Oral Chemotherapy Monitoring Program     Placed call to patient in follow up of oral chemotherapy, Zytiga. Not able to leave a message requesting call back. Phone rang two times and then went silent. Will try again later.     Flaca Rodriguez, PharmD  Oral Chemotherapy Monitoring Program  [facility]  [phone]

## 2025-06-04 ENCOUNTER — APPOINTMENT (OUTPATIENT)
Dept: RADIATION ONCOLOGY | Facility: CLINIC | Age: 70
End: 2025-06-04
Attending: UROLOGY
Payer: COMMERCIAL

## 2025-06-04 DIAGNOSIS — C61 PROSTATE CANCER (H): ICD-10-CM

## 2025-06-04 DIAGNOSIS — C77.5 METASTASIS TO ILIAC LYMPH NODE (H): Primary | ICD-10-CM

## 2025-06-04 PROCEDURE — 77385 HC IMRT TREATMENT DELIVERY, SIMPLE: CPT | Performed by: RADIOLOGY

## 2025-06-04 PROCEDURE — 77014 PR CT GUIDE FOR PLACEMENT RADIATION THERAPY FIELDS: CPT | Mod: 26 | Performed by: RADIOLOGY

## 2025-06-04 RX ORDER — ABIRATERONE ACETATE 250 MG/1
1000 TABLET ORAL DAILY
Qty: 120 TABLET | Refills: 0 | Status: SHIPPED | OUTPATIENT
Start: 2025-06-04 | End: 2025-07-04

## 2025-06-04 RX ORDER — PREDNISONE 5 MG/1
5 TABLET ORAL
Qty: 30 TABLET | Refills: 0 | Status: SHIPPED | OUTPATIENT
Start: 2025-06-04 | End: 2025-07-04

## 2025-06-05 ENCOUNTER — TELEPHONE (OUTPATIENT)
Dept: ONCOLOGY | Facility: CLINIC | Age: 70
End: 2025-06-05

## 2025-06-05 ENCOUNTER — APPOINTMENT (OUTPATIENT)
Dept: RADIATION ONCOLOGY | Facility: CLINIC | Age: 70
End: 2025-06-05
Attending: UROLOGY
Payer: COMMERCIAL

## 2025-06-05 VITALS
HEART RATE: 76 BPM | RESPIRATION RATE: 16 BRPM | DIASTOLIC BLOOD PRESSURE: 82 MMHG | SYSTOLIC BLOOD PRESSURE: 115 MMHG | BODY MASS INDEX: 30.23 KG/M2 | WEIGHT: 193 LBS

## 2025-06-05 DIAGNOSIS — C61 PROSTATE CANCER (H): Primary | ICD-10-CM

## 2025-06-05 PROCEDURE — 77385 HC IMRT TREATMENT DELIVERY, SIMPLE: CPT | Performed by: RADIOLOGY

## 2025-06-05 NOTE — ORAL ONC MGMT
Oral Chemotherapy Monitoring Program     Placed call to patient in follow up of oral chemotherapy, Zytiga. Was calling to do a monthly assessment; call when straight to voicemail. Left message requesting call back. No drug names were mentioned. Will update when response received.     Flaca Rodriguez, DannyD  Oral Chemotherapy Monitoring Program  [facility]  [phone]

## 2025-06-05 NOTE — LETTER
2025      Edd Bazan  [address]  [place] [address]      Dear Colleague,    Thank you for referring your patient, Edd Bazan, to the [facility] RADIATION ONCOLOGY. Please see a copy of my visit note below.    [facility] PHYSICIANS  SPECIALIZING IN BREAKTHROUGHS  Radiation Oncology    On Treatment Visit Note      Edd Bazan      Date: 2025   MRN: [id number]   : 1955  Diagnosis: Prostate Cancer      Reason for Visit:  On Radiation Treatment Visit     Treatment Summary to Date  Treatment Site: Pelvis Current Dose: 4400/7000 cGy Fractions:       Chemotherapy  Chemo concurrent with radx?: No    ED Visit/Hospital Admission: na    Treatment Breaks: na    Subjective:     Stools stable, not requiring laxatives or imodium. No change in baseline urination.    Nursing ROS:   Nutrition Alteration  Diet Type: Patient's Preference  Skin  Skin Reaction: 0 - No changes        Cardiovascular  Respiratory effort: 1 - Normal - without distress  Gastrointestinal  Diarrhea: 0 - None  GI Note: Will get Immodium  Genitourinary   Note: Nocturia once a night     Pain Assessment  0-10 Pain Scale: 0    Objective:   /82   Pulse 76   Resp 16   Wt 87.5 kg (193 lb)   BMI 30.23 kg/m    Gen: Appears well, in no acute distress  Skin: No erythema    Labs:  CBC RESULTS:   Recent Labs   Lab Test 25  0823   WBC 4.9   RBC 4.13*   HGB 11.8*   HCT 35.2*   MCV 85   MCH 28.6   MCHC 33.5   RDW 14.6        ELECTROLYTES:  Recent Labs   Lab Test 25  0823   *   POTASSIUM 3.7   CHLORIDE 96*   NEGRITA 9.0   CO2 29   BUN 16.1   CR 0.76   GLC 99       Assessment:    Tolerating radiation therapy well.  All questions and concerns addressed.    Toxicities:  None    Plan:   Continue radiation treatments as planned.    Ports/IGRT reviewed          Medication Review  Med list reviewed with patient?: Yes  Med list printed and given: Offered and  declined    Educational Topic Discussed  Education Instructions: Reviewed        Kevin Pacheco MD  [phone] clinic      Again, thank you for allowing me to participate in the care of your patient.        Sincerely,        Kevin Pacheco MD    Electronically signed

## 2025-06-05 NOTE — PROGRESS NOTES
[facility] PHYSICIANS  SPECIALIZING IN BREAKTHROUGHS  Radiation Oncology    On Treatment Visit Note      Edd Bazan      Date: 2025   MRN: [id number]   : 1955  Diagnosis: Prostate Cancer      Reason for Visit:  On Radiation Treatment Visit     Treatment Summary to Date  Treatment Site: Pelvis Current Dose: 4400/7000 cGy Fractions:       Chemotherapy  Chemo concurrent with radx?: No    ED Visit/Hospital Admission: na    Treatment Breaks: na    Subjective:     Stools stable, not requiring laxatives or imodium. No change in baseline urination.    Nursing ROS:   Nutrition Alteration  Diet Type: Patient's Preference  Skin  Skin Reaction: 0 - No changes        Cardiovascular  Respiratory effort: 1 - Normal - without distress  Gastrointestinal  Diarrhea: 0 - None  GI Note: Will get Immodium  Genitourinary   Note: Nocturia once a night     Pain Assessment  0-10 Pain Scale: 0    Objective:   /82   Pulse 76   Resp 16   Wt 87.5 kg (193 lb)   BMI 30.23 kg/m    Gen: Appears well, in no acute distress  Skin: No erythema    Labs:  CBC RESULTS:   Recent Labs   Lab Test 25  0823   WBC 4.9   RBC 4.13*   HGB 11.8*   HCT 35.2*   MCV 85   MCH 28.6   MCHC 33.5   RDW 14.6        ELECTROLYTES:  Recent Labs   Lab Test 25  0823   *   POTASSIUM 3.7   CHLORIDE 96*   NEGRITA 9.0   CO2 29   BUN 16.1   CR 0.76   GLC 99       Assessment:    Tolerating radiation therapy well.  All questions and concerns addressed.    Toxicities:  None    Plan:   Continue radiation treatments as planned.    Ports/IGRT reviewed          Medication Review  Med list reviewed with patient?: Yes  Med list printed and given: Offered and declined    Educational Topic Discussed  Education Instructions: Reviewed        Kevin Pacheco MD  [phone] clinic

## 2025-06-06 ENCOUNTER — APPOINTMENT (OUTPATIENT)
Dept: RADIATION ONCOLOGY | Facility: CLINIC | Age: 70
End: 2025-06-06
Attending: UROLOGY
Payer: COMMERCIAL

## 2025-06-06 PROCEDURE — 77385 HC IMRT TREATMENT DELIVERY, SIMPLE: CPT | Performed by: RADIOLOGY

## 2025-06-06 PROCEDURE — 77014 PR CT GUIDE FOR PLACEMENT RADIATION THERAPY FIELDS: CPT | Mod: 26 | Performed by: RADIOLOGY

## 2025-06-09 ENCOUNTER — TELEPHONE (OUTPATIENT)
Dept: ONCOLOGY | Facility: CLINIC | Age: 70
End: 2025-06-09
Payer: COMMERCIAL

## 2025-06-09 ENCOUNTER — APPOINTMENT (OUTPATIENT)
Dept: RADIATION ONCOLOGY | Facility: CLINIC | Age: 70
End: 2025-06-09
Attending: UROLOGY
Payer: COMMERCIAL

## 2025-06-09 PROCEDURE — 77385 HC IMRT TREATMENT DELIVERY, SIMPLE: CPT | Performed by: RADIOLOGY

## 2025-06-09 NOTE — TELEPHONE ENCOUNTER
Oral Chemotherapy Monitoring Program    Subjective/Objective:  Edd Bazan is a 70 year old male contacted by phone for a follow-up visit for oral chemotherapy.  Edd reports that he is taking his abiraterone (Zytiga) as 4 tablets (1000 mg) by mouth once daily on an empty stomach, has not missed a dose in the past 2 weeks, and is adherent. He has been getting radiation therapy daily for several weeks and had expressed some annoyance with these appointments. His travel time from home is about 45 minutes and utilizes public transportation, which can be often unreliable. He is very excited for this regimen to be complete as the time requirement impacts his daily tasks/work. He has missed recent lab appointments, which may be explained by these transportation. Overall, he stated that he's been doing well with few side effects and has been able to still work regularly throughout his treatment. He denies nausea, vomiting, diarrhea, constipation, fatigue, hypertension, edema, hot flashes, and joint/muscle pains.         4/21/2025    12:00 PM 5/20/2025    11:00 AM 6/3/2025     2:00 PM 6/4/2025     3:00 PM 6/5/2025     4:00 PM 6/6/2025    12:00 PM 6/9/2025     2:00 PM   ORAL CHEMOTHERAPY   Assessment Type Lab Monitoring Lab Monitoring Other Refill Left Voicemail Incoming phone call;Monthly Follow up Monthly Follow up   Diagnosis Code Prostate Cancer Prostate Cancer Prostate Cancer Prostate Cancer Prostate Cancer Prostate Cancer Prostate Cancer   Providers [facility]   [facility]   Is this patient followed by the [facility] OC team? No No No No No No No   Drug Name Zytiga (abiraterone) Zytiga (abiraterone) Zytiga (abiraterone) Zytiga (abiraterone) Zytiga (abiraterone) Zytiga (abiraterone) Zytiga (abiraterone)   Dose 1,000 mg  1,000 mg  1,000 mg 1,000 mg 1,000 mg 1,000 mg 1,000 mg   Current Schedule Daily Daily  "Daily Daily Daily Daily Daily   Cycle Details Continuous Continuous Continuous Continuous Continuous Continuous Continuous   Doses missed in last 2 weeks      0 0   Adherence Assessment      Adherent Adherent   Adverse Effects No AE identified during assessment     No AE identified during assessment No AE identified during assessment   Home BPs      all BPs<140/90    Any new drug interactions?      No    Is the dose as ordered appropriate for the patient?      Yes    Has the patient missed any days of school, work, or other routine activity?      No    Since the last time we talked, have you been hospitalized or used the emergency room?      No    Was a medication prescribed as part of a CPA?      No        Data saved with a previous flowsheet row definition       Last PHQ-2 Score on record:       5/5/2025     2:25 PM 5/5/2025     2:18 PM   PHQ-2 ( 1999 [employer])   Q1: Little interest or pleasure in doing things 0 0    Q2: Feeling down, depressed or hopeless 0 0    PHQ-2 Score 0 0    Q1: Little interest or pleasure in doing things  Not at all   Q2: Feeling down, depressed or hopeless  Not at all   PHQ-2 Score  0       Proxy-reported       Vitals:  BP:   BP Readings from Last 1 Encounters:   06/05/25 115/82     Wt Readings from Last 1 Encounters:   06/05/25 87.5 kg (193 lb)     Estimated body surface area is 2.03 meters squared as calculated from the following:    Height as of 5/5/25: 1.702 m (5' 7\").    Weight as of 6/5/25: 87.5 kg (193 lb).    Labs:  _  Result Component Current Result Ref Range   Sodium 134 (L) (5/20/2025) 135 - 145 mmol/L     _  Result Component Current Result Ref Range   Potassium 3.7 (5/20/2025) 3.4 - 5.3 mmol/L     _  Result Component Current Result Ref Range   Calcium 9.0 (5/20/2025) 8.8 - 10.4 mg/dL     No results found for Mag within last 30 days.     No results found for Phos within last 30 days.     _  Result Component Current Result Ref Range   Albumin 4.1 (5/20/2025) 3.5 - 5.2 g/dL "     _  Result Component Current Result Ref Range   Urea Nitrogen 16.1 (5/20/2025) 8.0 - 23.0 mg/dL     _  Result Component Current Result Ref Range   Creatinine 0.76 (5/20/2025) 0.67 - 1.17 mg/dL     _  Result Component Current Result Ref Range   AST 30 (5/20/2025) 0 - 45 U/L     _  Result Component Current Result Ref Range   ALT 53 (5/20/2025) 0 - 70 U/L     _  Result Component Current Result Ref Range   Bilirubin Total 0.5 (5/20/2025) <=1.2 mg/dL     _  Result Component Current Result Ref Range   WBC Count 4.9 (5/20/2025) 4.0 - 11.0 10e3/uL     _  Result Component Current Result Ref Range   Hemoglobin 11.8 (L) (5/20/2025) 13.3 - 17.7 g/dL     _  Result Component Current Result Ref Range   Platelet Count 164 (5/20/2025) 150 - 450 10e3/uL     _  Result Component Current Result Ref Range   Absolute Neutrophils 3.5 (5/20/2025) 1.6 - 8.3 10e3/uL     No results found for ANC within last 30 days.          Assessment/Plan:    As Edd has been doing well with few adverse effects from both his radiation and chemotherapies, he was told to continue to take his abiraterone (Zytiga) as prescribed.     Scheduling was contacted to set up his next labs for Thursday following his radiation visit.     Edd was also told that labs can be set up closer to home if he would like but denied this recommendation today.    Follow-Up:    Next labs: 6/11 pending scheduling  Next oncology appointment: 6/23 with Dr. Whelan    Refill Due:  7/10    Miguel Diaz  Pharmacy Intern  Oral Chemotherapy Monitoring Program  [facility]  [phone]

## 2025-06-10 ENCOUNTER — APPOINTMENT (OUTPATIENT)
Dept: RADIATION ONCOLOGY | Facility: CLINIC | Age: 70
End: 2025-06-10
Attending: UROLOGY
Payer: COMMERCIAL

## 2025-06-10 PROCEDURE — 77014 PR CT GUIDE FOR PLACEMENT RADIATION THERAPY FIELDS: CPT | Mod: 26 | Performed by: STUDENT IN AN ORGANIZED HEALTH CARE EDUCATION/TRAINING PROGRAM

## 2025-06-10 PROCEDURE — 77336 RADIATION PHYSICS CONSULT: CPT | Performed by: RADIOLOGY

## 2025-06-10 PROCEDURE — 77427 RADIATION TX MANAGEMENT X5: CPT | Performed by: RADIOLOGY

## 2025-06-10 PROCEDURE — 77385 HC IMRT TREATMENT DELIVERY, SIMPLE: CPT | Performed by: RADIOLOGY

## 2025-06-11 ENCOUNTER — APPOINTMENT (OUTPATIENT)
Dept: RADIATION ONCOLOGY | Facility: CLINIC | Age: 70
End: 2025-06-11
Attending: UROLOGY
Payer: COMMERCIAL

## 2025-06-11 PROCEDURE — 77014 PR CT GUIDE FOR PLACEMENT RADIATION THERAPY FIELDS: CPT | Mod: 26 | Performed by: STUDENT IN AN ORGANIZED HEALTH CARE EDUCATION/TRAINING PROGRAM

## 2025-06-11 PROCEDURE — 77385 HC IMRT TREATMENT DELIVERY, SIMPLE: CPT | Performed by: RADIOLOGY

## 2025-06-12 ENCOUNTER — LAB (OUTPATIENT)
Dept: LAB | Facility: CLINIC | Age: 70
End: 2025-06-12
Attending: INTERNAL MEDICINE
Payer: COMMERCIAL

## 2025-06-12 ENCOUNTER — DOCUMENTATION ONLY (OUTPATIENT)
Dept: ONCOLOGY | Facility: CLINIC | Age: 70
End: 2025-06-12

## 2025-06-12 ENCOUNTER — OFFICE VISIT (OUTPATIENT)
Dept: RADIATION ONCOLOGY | Facility: CLINIC | Age: 70
End: 2025-06-12
Attending: UROLOGY
Payer: COMMERCIAL

## 2025-06-12 ENCOUNTER — TELEPHONE (OUTPATIENT)
Dept: ONCOLOGY | Facility: CLINIC | Age: 70
End: 2025-06-12

## 2025-06-12 VITALS
WEIGHT: 189 LBS | RESPIRATION RATE: 16 BRPM | DIASTOLIC BLOOD PRESSURE: 72 MMHG | HEART RATE: 89 BPM | BODY MASS INDEX: 29.6 KG/M2 | SYSTOLIC BLOOD PRESSURE: 112 MMHG

## 2025-06-12 DIAGNOSIS — C77.5 METASTASIS TO ILIAC LYMPH NODE (H): ICD-10-CM

## 2025-06-12 DIAGNOSIS — C61 PROSTATE CANCER (H): ICD-10-CM

## 2025-06-12 DIAGNOSIS — Z79.899 ENCOUNTER FOR LONG-TERM (CURRENT) USE OF HIGH-RISK MEDICATION: Primary | ICD-10-CM

## 2025-06-12 DIAGNOSIS — C61 PROSTATE CANCER (H): Primary | ICD-10-CM

## 2025-06-12 LAB
ALBUMIN SERPL BCG-MCNC: 3.9 G/DL (ref 3.5–5.2)
ALP SERPL-CCNC: 75 U/L (ref 40–150)
ALT SERPL W P-5'-P-CCNC: 25 U/L (ref 0–70)
ANION GAP SERPL CALCULATED.3IONS-SCNC: 10 MMOL/L (ref 7–15)
AST SERPL W P-5'-P-CCNC: 20 U/L (ref 0–45)
BILIRUB SERPL-MCNC: 0.4 MG/DL
BUN SERPL-MCNC: 19.2 MG/DL (ref 8–23)
CALCIUM SERPL-MCNC: 9.3 MG/DL (ref 8.8–10.4)
CHLORIDE SERPL-SCNC: 98 MMOL/L (ref 98–107)
CREAT SERPL-MCNC: 0.78 MG/DL (ref 0.67–1.17)
EGFRCR SERPLBLD CKD-EPI 2021: >90 ML/MIN/1.73M2
GLUCOSE SERPL-MCNC: 108 MG/DL (ref 70–99)
HCO3 SERPL-SCNC: 27 MMOL/L (ref 22–29)
POTASSIUM SERPL-SCNC: 3 MMOL/L (ref 3.4–5.3)
PROT SERPL-MCNC: 6.6 G/DL (ref 6.4–8.3)
SODIUM SERPL-SCNC: 135 MMOL/L (ref 135–145)

## 2025-06-12 PROCEDURE — 84155 ASSAY OF PROTEIN SERUM: CPT

## 2025-06-12 PROCEDURE — 36415 COLL VENOUS BLD VENIPUNCTURE: CPT

## 2025-06-12 PROCEDURE — 77385 HC IMRT TREATMENT DELIVERY, SIMPLE: CPT | Performed by: STUDENT IN AN ORGANIZED HEALTH CARE EDUCATION/TRAINING PROGRAM

## 2025-06-12 RX ORDER — POTASSIUM CHLORIDE 750 MG/1
10 TABLET, EXTENDED RELEASE ORAL DAILY
Qty: 30 TABLET | Refills: 0 | Status: SHIPPED | OUTPATIENT
Start: 2025-06-12

## 2025-06-12 NOTE — TELEPHONE ENCOUNTER
Oral Chemotherapy Monitoring Program     Placed call to patient in follow up of abiraterone (Zytiga) therapy.     Left message to please call back in follow up of therapy. No patient or drug names were mentioned.    Miguel Diaz  Pharmacy Intern  Oral Chemotherapy Monitoring Program  [facility]   [phone]

## 2025-06-12 NOTE — LETTER
2025      Edd Bazan  [address]  [facility] [address]      Dear Colleague,    Thank you for referring your patient, Edd Bazan, to the [facility] RADIATION ONCOLOGY. Please see a copy of my visit note below.    [facility] PHYSICIANS  SPECIALIZING IN BREAKTHROUGHS  Radiation Oncology    On Treatment Visit Note      Edd Bazan      Date: 2025   MRN: [id number]   : 1955  Diagnosis: Prostate Cancer      Reason for Visit:  On Radiation Treatment Visit     Treatment Summary to Date  Treatment Site: Pelvis Current Dose: 5400/7000 cGy Fractions:       Chemotherapy  Chemo concurrent with radx?: No    ED Visit/Hospital Admission: na    Treatment Breaks: na    Subjective:     Stools increased in frequency this week, not significantly bothersome. The patient will  immodium just in case. No change in baseline urination.    Nursing ROS:   Nutrition Alteration  Diet Type: Patient's Preference  Skin  Skin Reaction: 0 - No changes        Cardiovascular  Respiratory effort: 1 - Normal - without distress  Gastrointestinal  Diarrhea: 0 - None  GI Note: Will get Immodium  Genitourinary   Note: Nocturia once a night     Pain Assessment  0-10 Pain Scale: 0    Objective:   /72   Pulse 89   Resp 16   Wt 85.7 kg (189 lb)   BMI 29.60 kg/m    Gen: Appears well, in no acute distress  Skin: No erythema    Labs:  CBC RESULTS:   Recent Labs   Lab Test 25  0823   WBC 4.9   RBC 4.13*   HGB 11.8*   HCT 35.2*   MCV 85   MCH 28.6   MCHC 33.5   RDW 14.6        ELECTROLYTES:  Recent Labs   Lab Test 25  0823   *   POTASSIUM 3.7   CHLORIDE 96*   NEGRITA 9.0   CO2 29   BUN 16.1   CR 0.76   GLC 99       Assessment:    Tolerating radiation therapy well.  All questions and concerns addressed.    Toxicities:  None    Plan:   Continue radiation treatments as planned. Patient will  immodium.  Ports/IGRT reviewed          Medication  Review  Med list reviewed with patient?: Yes  Med list printed and given: Offered and declined    Educational Topic Discussed  Education Instructions: Reviewed        Claus Esposito MD  [phone] clinic      Again, thank you for allowing me to participate in the care of your patient.        Sincerely,        Claus Esposito MD    Electronically signed

## 2025-06-12 NOTE — PROGRESS NOTES
Oral Chemotherapy Monitoring Program  Lab Follow Up    Reviewed lab results from 6/12/25.        5/20/2025    11:00 AM 6/3/2025     2:00 PM 6/4/2025     3:00 PM 6/5/2025     4:00 PM 6/6/2025    12:00 PM 6/9/2025     2:00 PM 6/12/2025     1:00 PM   ORAL CHEMOTHERAPY   Assessment Type Lab Monitoring Other Refill Left Voicemail Incoming phone call;Monthly Follow up Monthly Follow up Lab Monitoring   Diagnosis Code Prostate Cancer Prostate Cancer Prostate Cancer Prostate Cancer Prostate Cancer Prostate Cancer Prostate Cancer   Providers [facility]   [facility]   Is this patient followed by the [facility] team? No No No No No No No   Drug Name Zytiga (abiraterone)  Zytiga (abiraterone)  Zytiga (abiraterone)  Zytiga (abiraterone)  Zytiga (abiraterone)  Zytiga (abiraterone)  Zytiga (abiraterone)   Dose 1,000 mg  1,000 mg  1,000 mg  1,000 mg  1,000 mg  1,000 mg  1,000 mg   Current Schedule Daily  Daily  Daily  Daily  Daily  Daily  Daily   Cycle Details Continuous Continuous Continuous Continuous Continuous Continuous Continuous   Doses missed in last 2 weeks     0 0    Adherence Assessment     Adherent Adherent    Adverse Effects     No AE identified during assessment No AE identified during assessment    Home BPs     all BPs<140/90     Any new drug interactions?     No     Is the dose as ordered appropriate for the patient?     Yes     Has the patient missed any days of school, work, or other routine activity?     No     Since the last time we talked, have you been hospitalized or used the emergency room?     No     Was a medication prescribed as part of a CPA?     No         Data saved with a previous flowsheet row definition       Labs:  _  Result Component Current Result Ref Range   Sodium 135 (6/12/2025) 135 - 145 mmol/L     _  Result Component Current Result Ref Range   Potassium 3.0 (L) (6/12/2025) 3.4 - 5.3  mmol/L     _  Result Component Current Result Ref Range   Calcium 9.3 (6/12/2025) 8.8 - 10.4 mg/dL     No results found for Mag within last 30 days.     No results found for Phos within last 30 days.     _  Result Component Current Result Ref Range   Albumin 3.9 (6/12/2025) 3.5 - 5.2 g/dL     _  Result Component Current Result Ref Range   Urea Nitrogen 19.2 (6/12/2025) 8.0 - 23.0 mg/dL     _  Result Component Current Result Ref Range   Creatinine 0.78 (6/12/2025) 0.67 - 1.17 mg/dL     _  Result Component Current Result Ref Range   AST 20 (6/12/2025) 0 - 45 U/L     _  Result Component Current Result Ref Range   ALT 25 (6/12/2025) 0 - 70 U/L     _  Result Component Current Result Ref Range   Bilirubin Total 0.4 (6/12/2025) <=1.2 mg/dL     _  Result Component Current Result Ref Range   WBC Count 4.9 (5/20/2025) 4.0 - 11.0 10e3/uL     _  Result Component Current Result Ref Range   Hemoglobin 11.8 (L) (5/20/2025) 13.3 - 17.7 g/dL     _  Result Component Current Result Ref Range   Platelet Count 164 (5/20/2025) 150 - 450 10e3/uL     _  Result Component Current Result Ref Range   Absolute Neutrophils 3.5 (5/20/2025) 1.6 - 8.3 10e3/uL     No results found for ANC within last 30 days.        Assessment & Plan:  Results are concerning for Edd's potassium. He is Grade 0 hypokalemic at this time. Dr. Whelan was contacted regarding this and recommended K-Dur 10 mEq daily x1 month.     Edd was contacted via [employer] regarding his results and told to continue to take his abiraterone (Zytiga) as prescribed.     He was also informed of the new script told to take his new potassium supplement as directed. Common side effects and administration information were shared with him as well.       Follow-Up:  Next labs: 6/16  Next oncology appt: 6/23 with Dr. Tip Diaz  Pharmacy Intern  Oral Chemotherapy Monitoring Program  [facility]  [phone]

## 2025-06-12 NOTE — PROGRESS NOTES
[facility] PHYSICIANS  SPECIALIZING IN BREAKTHROUGHS  Radiation Oncology    On Treatment Visit Note      Edd Bazan      Date: 2025   MRN: [id number]   : 1955  Diagnosis: Prostate Cancer      Reason for Visit:  On Radiation Treatment Visit     Treatment Summary to Date  Treatment Site: Pelvis Current Dose: 5400/7000 cGy Fractions:       Chemotherapy  Chemo concurrent with radx?: No    ED Visit/Hospital Admission: na    Treatment Breaks: na    Subjective:     Stools increased in frequency this week, not significantly bothersome. The patient will  immodium just in case. No change in baseline urination.    Nursing ROS:   Nutrition Alteration  Diet Type: Patient's Preference  Skin  Skin Reaction: 0 - No changes        Cardiovascular  Respiratory effort: 1 - Normal - without distress  Gastrointestinal  Diarrhea: 0 - None  GI Note: Will get Immodium  Genitourinary   Note: Nocturia once a night     Pain Assessment  0-10 Pain Scale: 0    Objective:   /72   Pulse 89   Resp 16   Wt 85.7 kg (189 lb)   BMI 29.60 kg/m    Gen: Appears well, in no acute distress  Skin: No erythema    Labs:  CBC RESULTS:   Recent Labs   Lab Test 25  0823   WBC 4.9   RBC 4.13*   HGB 11.8*   HCT 35.2*   MCV 85   MCH 28.6   MCHC 33.5   RDW 14.6        ELECTROLYTES:  Recent Labs   Lab Test 25  0823   *   POTASSIUM 3.7   CHLORIDE 96*   NEGRITA 9.0   CO2 29   BUN 16.1   CR 0.76   GLC 99       Assessment:    Tolerating radiation therapy well.  All questions and concerns addressed.    Toxicities:  None    Plan:   Continue radiation treatments as planned. Patient will  immodium.  Ports/IGRT reviewed          Medication Review  Med list reviewed with patient?: Yes  Med list printed and given: Offered and declined    Educational Topic Discussed  Education Instructions: Reviewed        Claus Esposito MD  [phone] clinic

## 2025-06-13 ENCOUNTER — APPOINTMENT (OUTPATIENT)
Dept: RADIATION ONCOLOGY | Facility: CLINIC | Age: 70
End: 2025-06-13
Attending: UROLOGY
Payer: COMMERCIAL

## 2025-06-13 PROCEDURE — 77385 HC IMRT TREATMENT DELIVERY, SIMPLE: CPT | Performed by: RADIOLOGY

## 2025-06-13 PROCEDURE — 77014 PR CT GUIDE FOR PLACEMENT RADIATION THERAPY FIELDS: CPT | Mod: 26 | Performed by: STUDENT IN AN ORGANIZED HEALTH CARE EDUCATION/TRAINING PROGRAM

## 2025-06-16 ENCOUNTER — APPOINTMENT (OUTPATIENT)
Dept: RADIATION ONCOLOGY | Facility: CLINIC | Age: 70
End: 2025-06-16
Attending: UROLOGY
Payer: COMMERCIAL

## 2025-06-16 PROCEDURE — 77385 HC IMRT TREATMENT DELIVERY, SIMPLE: CPT | Performed by: RADIOLOGY

## 2025-06-17 ENCOUNTER — APPOINTMENT (OUTPATIENT)
Dept: RADIATION ONCOLOGY | Facility: CLINIC | Age: 70
End: 2025-06-17
Attending: UROLOGY
Payer: COMMERCIAL

## 2025-06-17 PROCEDURE — 77014 PR CT GUIDE FOR PLACEMENT RADIATION THERAPY FIELDS: CPT | Mod: 26 | Performed by: RADIOLOGY

## 2025-06-17 PROCEDURE — 77385 HC IMRT TREATMENT DELIVERY, SIMPLE: CPT | Performed by: RADIOLOGY

## 2025-06-17 PROCEDURE — 77336 RADIATION PHYSICS CONSULT: CPT | Performed by: RADIOLOGY

## 2025-06-17 PROCEDURE — 77427 RADIATION TX MANAGEMENT X5: CPT | Performed by: STUDENT IN AN ORGANIZED HEALTH CARE EDUCATION/TRAINING PROGRAM

## 2025-06-18 ENCOUNTER — APPOINTMENT (OUTPATIENT)
Dept: RADIATION ONCOLOGY | Facility: CLINIC | Age: 70
End: 2025-06-18
Attending: UROLOGY
Payer: COMMERCIAL

## 2025-06-18 PROCEDURE — 77385 HC IMRT TREATMENT DELIVERY, SIMPLE: CPT | Performed by: RADIOLOGY

## 2025-06-18 PROCEDURE — 77014 PR CT GUIDE FOR PLACEMENT RADIATION THERAPY FIELDS: CPT | Mod: 26 | Performed by: RADIOLOGY

## 2025-06-19 ENCOUNTER — OFFICE VISIT (OUTPATIENT)
Dept: RADIATION ONCOLOGY | Facility: CLINIC | Age: 70
End: 2025-06-19
Attending: UROLOGY
Payer: COMMERCIAL

## 2025-06-19 VITALS
HEART RATE: 85 BPM | DIASTOLIC BLOOD PRESSURE: 75 MMHG | RESPIRATION RATE: 16 BRPM | SYSTOLIC BLOOD PRESSURE: 117 MMHG | BODY MASS INDEX: 30.07 KG/M2 | WEIGHT: 192 LBS

## 2025-06-19 DIAGNOSIS — C61 PROSTATE CANCER (H): Primary | ICD-10-CM

## 2025-06-19 PROCEDURE — 77385 HC IMRT TREATMENT DELIVERY, SIMPLE: CPT | Performed by: RADIOLOGY

## 2025-06-19 NOTE — LETTER
2025      Edd Bazan  [address]  [facility] [address]      Dear Colleague,    Thank you for referring your patient, Edd Bazan, to the [facility] RADIATION ONCOLOGY. Please see a copy of my visit note below.    [facility] PHYSICIANS  SPECIALIZING IN BREAKTHROUGHS  Radiation Oncology    On Treatment Visit Note      Edd Bazan      Date: 2025   MRN: [id number]   : 1955  Diagnosis: Prostate Cancer      Reason for Visit:  On Radiation Treatment Visit     Treatment Summary to Date  Pelvis and prostate 6400 cGy/ 7000 cGy    Chemotherapy  Chemo concurrent with radx?: No    ED Visit/Hospital Admission: na    Treatment Breaks: na    Subjective:     Stools stable, not requiring laxatives or imodium.  Having 2-3 episodes of diarrhea per day.  Mild fatigue.    Nursing ROS:   Nutrition Alteration  Diet Type: Patient's Preference  Skin  Skin Reaction: 0 - No changes     Cardiovascular  Respiratory effort: 1 - Normal - without distress  Gastrointestinal  Diarrhea: 1 - Abdominal cramping, two or less soft or liquid bowel movements  GI Note: Doesnt want to take Immodium  Genitourinary   Note: Nocturia once a night     Pain Assessment  0-10 Pain Scale: 0     Objective:   /75   Pulse 85   Resp 16   Wt 87.1 kg (192 lb)   BMI 30.07 kg/m    Gen: Appears well, in no acute distress  Skin: No erythema    Labs:  CBC RESULTS:   Recent Labs   Lab Test 25  0823   WBC 4.9   RBC 4.13*   HGB 11.8*   HCT 35.2*   MCV 85   MCH 28.6   MCHC 33.5   RDW 14.6        ELECTROLYTES:  Recent Labs   Lab Test 25  0823   *   POTASSIUM 3.7   CHLORIDE 96*   NEGRITA 9.0   CO2 29   BUN 16.1   CR 0.76   GLC 99       Assessment:    Tolerating radiation therapy well.  All questions and concerns addressed.    Toxicities:  None    Plan:   Continue radiation treatments as planned.    Ports/IGRT reviewed  Follow-up with Denise Narvaez NP in 2 months.  Regular  follow-up with Dr. Whelan  Imodium as needed for diarrhea      Medication Review  Med list reviewed with patient?: Yes  Med list printed and given: Offered and declined    Educational Topic Discussed  Education Instructions: Reviewed      Avtar Aragon MD  PGY2  Department of Radiation Oncology  [facility]     Physician Attestation  Mr. Bazan was seen and examined by me. I agree with the findings and plan of care as documented in the note. Note above by Dr. Aragon was reviewed and edited by me and reflects our mutual findings and plan of care.  I personally reviewed the available treatment setup images and vital signs listed.     Marion Gonzalez MD  Department of Radiation Oncology  [facility]            Again, thank you for allowing me to participate in the care of your patient.        Sincerely,        Marion Gonzalez MD    Electronically signed

## 2025-06-19 NOTE — PROGRESS NOTES
[facility] PHYSICIANS  SPECIALIZING IN BREAKTHROUGHS  Radiation Oncology    On Treatment Visit Note      Edd Bazan      Date: 2025   MRN: [id number]   : 1955  Diagnosis: Prostate Cancer      Reason for Visit:  On Radiation Treatment Visit     Treatment Summary to Date  Pelvis and prostate 6400 cGy/ 7000 cGy    Chemotherapy  Chemo concurrent with radx?: No    ED Visit/Hospital Admission: na    Treatment Breaks: na    Subjective:     Stools stable, not requiring laxatives or imodium.  Having 2-3 episodes of diarrhea per day.  Mild fatigue.    Nursing ROS:   Nutrition Alteration  Diet Type: Patient's Preference  Skin  Skin Reaction: 0 - No changes     Cardiovascular  Respiratory effort: 1 - Normal - without distress  Gastrointestinal  Diarrhea: 1 - Abdominal cramping, two or less soft or liquid bowel movements  GI Note: Doesnt want to take Immodium  Genitourinary   Note: Nocturia once a night     Pain Assessment  0-10 Pain Scale: 0     Objective:   /75   Pulse 85   Resp 16   Wt 87.1 kg (192 lb)   BMI 30.07 kg/m    Gen: Appears well, in no acute distress  Skin: No erythema    Labs:  CBC RESULTS:   Recent Labs   Lab Test 25  0823   WBC 4.9   RBC 4.13*   HGB 11.8*   HCT 35.2*   MCV 85   MCH 28.6   MCHC 33.5   RDW 14.6        ELECTROLYTES:  Recent Labs   Lab Test 25  0823   *   POTASSIUM 3.7   CHLORIDE 96*   NEGRITA 9.0   CO2 29   BUN 16.1   CR 0.76   GLC 99       Assessment:    Tolerating radiation therapy well.  All questions and concerns addressed.    Toxicities:  None    Plan:   Continue radiation treatments as planned.    Ports/IGRT reviewed  Follow-up with Denise Narvaez NP in 2 months.  Regular follow-up with Dr. Tip Bui as needed for diarrhea      Medication Review  Med list reviewed with patient?: Yes  Med list printed and given: Offered and declined    Educational Topic Discussed  Education Instructions: Reviewed      Avtar Aragon  MD  PGY2  Department of Radiation Oncology  [facility]     Physician Attestation  Mr. Bazan was seen and examined by me. I agree with the findings and plan of care as documented in the note. Note above by Dr. Aragon was reviewed and edited by me and reflects our mutual findings and plan of care.  I personally reviewed the available treatment setup images and vital signs listed.     Marion Gonzalez MD  Department of Radiation Oncology  [facility]

## 2025-06-20 ENCOUNTER — APPOINTMENT (OUTPATIENT)
Dept: RADIATION ONCOLOGY | Facility: CLINIC | Age: 70
End: 2025-06-20
Attending: UROLOGY
Payer: COMMERCIAL

## 2025-06-20 PROCEDURE — 77014 PR CT GUIDE FOR PLACEMENT RADIATION THERAPY FIELDS: CPT | Mod: 26 | Performed by: RADIOLOGY

## 2025-06-20 PROCEDURE — 77385 HC IMRT TREATMENT DELIVERY, SIMPLE: CPT | Performed by: RADIOLOGY

## 2025-06-21 NOTE — PROGRESS NOTES
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  Patient presents with an elevated PSA of 24.3 ng/mL (07/01/2024), PSA 31.59 (07/29/2024).  MRI prostate 08/08/2024, revealed the prostate measuring 6.4 x 5.8 x 3.7 cm with a volume of 71 g.  There was a 14 mm T2 heterogeneous lesion in the 6:00 to 7:00 position in the right apex peripheral zone with 6/15 mm capsular abutment and capsular irregularity (PI-RADS 4) designated as lesion 1.  There was a 10 mm, T2 heterogeneous lesion in the 4:00 to 5:00 position in the left apex peripheral zone with 6/15 mm capsular abutment (PI-RADS 4) designated as lesion 2.  The left neurovascular bundle was abutted by lesion 1.  There was no seminal vesicle involvement.  There was no lymphadenopathy.  There were areas of increased enhancement along the inferior pubic ramus.  UroNav MRI-ultrasound-guided prostate core needle biopsy 10/04/2024, revealed a [place] 3+4=7 acinar adenocarcinoma with perineural invasion involving 50% of 2 of 2 biopsy samples with perineural invasion from the right mid gland, 56% of 2 of 2 biopsy samples with perineural invasion from the right apex, and 78 % of 2 of 2 biopsy samples with perineural invasion from lesion 1 in the right apex peripheral zone.  There was a [place] 3+3=6 acinar adenocarcinoma without perineural invasion involving 4% of 1 of 2 core needle biopsy samples from the left apex, and 33% of 2 of 2 biopsy samples with perineural invasion from the right base.  There was atypical small acinar proliferation (ASAP) in 2 of 2 core needle biopsy samples from lesion 2 in the in the left apex peripheral zone.  There was benign prostatic tissue in 2 of 2 core needle biopsy samples from the left base and left mid gland.  Patient underwent  robotic-assisted laparoscopic radical prostatectomy and right pelvic lymphadenectomy 12/05/2024, that revealed a 3.9 cm, Robin 3+4=7 acinar adenocarcinoma involving 31-40% of the prostate.  There was focal extraprostatic extension involving the right lateral prostate.  There was perineural, but not lymphovascular invasion.  There was no urinary bladder neck or seminal vesicle invasion.  There was multifocal involvement of the right lateral, left apical, and left lateral surgical margins with invasive carcinoma.  Two of 5 right pelvic lymph nodes were positive for metastases with the largest focus of metastasis measuring 0.5 cm, without extranodal extension.  Left pelvic lymph node dissection was not performed due to a large left inguinal hernia.  Left inguinal hernia repair was performed 01/31/2025. Germline cancer gene mutation panel ([facility]) 03/17/2025, was negative for pathogenic variants. 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  Microsatellite instability status was MS-stable.  Tumor mutational burden was low at 4 Muts/Mb. gLOH was low at 4%.  Restaging CT chest, abdomen, pelvis 01/10/2025, was negative for adenopathy or metastases.  There were postsurgical changes related to prior prostatectomy and right pelvic lymph node dissection.  There were collections of mild redness enhancing collections on either side of the vesicourethral anastomosis measuring 1.3 x 2.1 x 1.4 cm on the right, and 5 x 1.9 x 1.6 cm on the left likely representing postoperative seromas.  There was a left indirect inguinal hernia containing fat and sigmoid colon.  There were chronic posttraumatic osseous deformities and heterotopic ossification throughout the pelvis with surgical hardware.  There was chronic posterior lateral right rib fracture deformities with multifocal intercostal osseous bridging. Bone scan  01/10/2025, was negative for metastases. There was multilevel degenerative uptake in the spine with bridging osteophytes corresponding to CT findings, heterogeneous uptake in the pelvic bones corresponding to postoperative change and new bone formation corresponding to CT findings, and bridging ossification of multiple right lateral ribs. There was significant degenerative uptake in the shoulders, left wrist, and bilateral knees.     Patient is recovered from prostatectomy and hernia repair surgery.  Patient is receiving long-term androgen deprivation therapy (ADT) consisting of leuprolide 22.5 mg every 3 months and abiraterone 1000 mg daily on an empty stomach plus prednisone 5 mg daily with breakfast x2 years beginning 03/21/2025, concurrent with adjuvant radiation therapy to the prostate bed and pelvic lymph nodes beginning 05/05/2025.  Radiation therapy will be completed tomorrow.  Patient has mild fatigue and loose bowel movements 2-3 times a day not requiring Imodium.  There are no other new symptoms since the last clinic visit 05/05/2025. Patient feels well and denies fever, anorexia, weight loss, headache, cough, dyspnea, chest pain, abdominal pain, nausea, vomiting, constipation, diarrhea, bleeding, or bone pain.     PAST HISTORY: Past history was reviewed and unchanged from the clinic note 12/30/2024.     MEDICATIONS:   Current Outpatient Medications   Medication Sig Dispense Refill    abiraterone (ZYTIGA) 250 MG tablet Take 4 tablets (1,000 mg) by mouth daily for 30 doses. Take on empty stomach. 120 tablet 0    aspirin 81 MG EC tablet Take 1 tablet (81 mg) by mouth daily 90 tablet 3    atorvastatin (LIPITOR) 10 MG tablet Take 1 tablet (10 mg) by mouth daily. 90 tablet 3    Docusate Calcium (STOOL SOFTENER PO) Take by mouth as needed.      lisinopril-hydrochlorothiazide (ZESTORETIC) 20-25 MG tablet Take 1 tablet by mouth daily. 90 tablet 3    potassium chloride pelon ER (KLOR-CON M10) 10 MEQ CR tablet Take  "1 tablet (10 mEq) by mouth daily. 30 tablet 0    predniSONE (DELTASONE) 5 MG tablet Take 1 tablet (5 mg) by mouth daily (with breakfast). 30 tablet 0    senna-docusate (SENOKOT-S/PERICOLACE) 8.6-50 MG tablet Take 1 tablet by mouth daily. 30 tablet 0    tadalafil (CIALIS) 5 MG tablet Take 1 tablet (5 mg) by mouth every 24 hours. 30 tablet 3       REVIEW OF SYSTEMS: Review of systems reviewed with the patient and otherwise negative except for those detailed above.    PHYSICAL EXAM: /70   Pulse 83   Temp 98.1  F (36.7  C) (Oral)   Resp 16   Ht 1.702 m (5' 7.01\")   Wt 86.8 kg (191 lb 4.8 oz)   SpO2 99%   BMI 29.95 kg/m    ECOG performance status: 0. Physical exam was unchanged from prior clinic visit 05/05/2025.  Skin: No erythema or rash.  HEENT: Sclera nonicteric. Oropharynx without lesions or ulceration, mucosa pink and moist.  Nodes: No cervical, supraclavicular, axillary, or inguinal adenopathy.  Lungs: No dullness to percussion.  No rales, wheezes, rhonchi.  Heart: Regular rate and rhythm.  Abdomen: Bowel sounds present.  Soft, nontender, no hepatosplenomegaly or mass.  Extremities: No edema.     LABORATORY REVIEWED:  Component      Latest Ref Rng 2/7/2025  12:41 PM 4/21/2025  10:12 AM 5/5/2025  7:55 AM 6/23/2025  10:27 AM   Sodium      135 - 145 mmol/L    138    Potassium      3.4 - 5.3 mmol/L    2.8 (L)    Carbon Dioxide (CO2)      22 - 29 mmol/L    25    Anion Gap      7 - 15 mmol/L    12    Urea Nitrogen      8.0 - 23.0 mg/dL    21.0    Creatinine      0.67 - 1.17 mg/dL    0.71    GFR Estimate      >60 mL/min/1.73m2    >90    Calcium      8.8 - 10.4 mg/dL    9.2    Chloride      98 - 107 mmol/L    101    Glucose      70 - 99 mg/dL    130 (H)    Alkaline Phosphatase      40 - 150 U/L    72    AST      0 - 45 U/L    19    ALT      0 - 70 U/L    28    Protein Total      6.4 - 8.3 g/dL    6.4    Albumin      3.5 - 5.2 g/dL    4.1    Bilirubin Total      <=1.2 mg/dL    0.4    PSA Tumor Marker      0.00 " - 6.50 ng/mL 0.02  <0.01  <0.01  <0.01    Testosterone Total      240 - 950 ng/dL  <2 (L)          IMPRESSION/PLAN:.Stage GINI adenocarcinoma of the prostate.  Prostate cancer is a 3.9 cm, [place] 3+4=7 acinar adenocarcinoma involving 31-40% of the prostate with focal extraprostatic extension, multifocal involvement of the surgical margins, and metastases to 2 of 5 right pelvic lymph nodes without extranodal extension. Patient has stage IV prostate cancer and germline cancer gene mutation panel ([facility]), and somatic next generation sequencing ([facility]) on the prostatectomy sample (12/05/2024) is pending to evaluate for mutations and biomarkers that can direct effective therapies.   Patient underwent definitive surgery (12/05/2024).  Left pelvic lymph node dissection was not performed due to the large left inguinal hernia.  Patient underwent left inguinal herniorrhaphy (01/31/2025).  Due to the high likelihood of left pelvic lymph node metastases, patient will receive long-term ADT consisting of leuprolide 22.5 mg every 3 months and abiraterone 1000 mg daily on an empty stomach plus prednisone 5 mg daily with breakfast x2 years beginning 03/21/2025, concurrent with adjuvant radiation therapy to the prostate bed and pelvic lymph nodes.  There is grade 1 fatigue, loose bowel movements, and anemia.  Patient has hypokalemia related to loose bowel movements, and he will take KCl 10 mEq BID for the next 3 days, then resume daily dosing thereafter.  Abiraterone/leuprolide will be continued without modification.  The next dose of leuprolide will be administered today.  I reviewed the risk and side effects of abiraterone with the patient, which is associated with fatigue, hot flashes, fever, cognitive difficulty, depression, rash, abdominal pain, nausea, vomiting, diarrhea, constipation, myalgias, arthralgias, edema, falls, fractures, adrenal insufficiency, hypertension, arrhythmia, myocardial infarction, heart failure,  hepatotoxicity, hyperlipidemia, anemia.  Leuprolide is associated with fatigue, hot flashes, weakness, loss of muscle mass, weight gain, forgetfulness, depression, irritability breast enlargement, loss of libido, impotence, joint stiffness and pain, coronary artery disease, osteoporosis, and bone fracture.  Patient understood the indication and risks and agreed to continue therapy.  Patient will return to clinic in 3 months with CBC, metabolic panel, PSA, and for the next leuprolide injection.  The current and past history, clinical evaluation, reviewing diagnostic tests with the patient, and assessment and planning occurred over 30 minutes.       Pascual Whelan MD    cc: MD Garth Diehl MD Clayton Chen, MD

## 2025-06-23 ENCOUNTER — ONCOLOGY VISIT (OUTPATIENT)
Dept: ONCOLOGY | Facility: CLINIC | Age: 70
End: 2025-06-23
Attending: INTERNAL MEDICINE
Payer: COMMERCIAL

## 2025-06-23 ENCOUNTER — APPOINTMENT (OUTPATIENT)
Dept: RADIATION ONCOLOGY | Facility: CLINIC | Age: 70
End: 2025-06-23
Attending: UROLOGY
Payer: COMMERCIAL

## 2025-06-23 VITALS
TEMPERATURE: 98.1 F | WEIGHT: 191.3 LBS | OXYGEN SATURATION: 99 % | HEIGHT: 67 IN | DIASTOLIC BLOOD PRESSURE: 70 MMHG | RESPIRATION RATE: 16 BRPM | HEART RATE: 83 BPM | BODY MASS INDEX: 30.02 KG/M2 | SYSTOLIC BLOOD PRESSURE: 106 MMHG

## 2025-06-23 DIAGNOSIS — Z79.899 ENCOUNTER FOR LONG-TERM (CURRENT) USE OF MEDICATIONS: ICD-10-CM

## 2025-06-23 DIAGNOSIS — C77.5 METASTASIS TO ILIAC LYMPH NODE (H): ICD-10-CM

## 2025-06-23 DIAGNOSIS — C61 PROSTATE CANCER (H): Primary | ICD-10-CM

## 2025-06-23 LAB
ALBUMIN SERPL BCG-MCNC: 4.1 G/DL (ref 3.5–5.2)
ALP SERPL-CCNC: 72 U/L (ref 40–150)
ALT SERPL W P-5'-P-CCNC: 28 U/L (ref 0–70)
ANION GAP SERPL CALCULATED.3IONS-SCNC: 12 MMOL/L (ref 7–15)
AST SERPL W P-5'-P-CCNC: 19 U/L (ref 0–45)
BILIRUB SERPL-MCNC: 0.4 MG/DL
BUN SERPL-MCNC: 21 MG/DL (ref 8–23)
CALCIUM SERPL-MCNC: 9.2 MG/DL (ref 8.8–10.4)
CHLORIDE SERPL-SCNC: 101 MMOL/L (ref 98–107)
CREAT SERPL-MCNC: 0.71 MG/DL (ref 0.67–1.17)
EGFRCR SERPLBLD CKD-EPI 2021: >90 ML/MIN/1.73M2
GLUCOSE SERPL-MCNC: 130 MG/DL (ref 70–99)
HCO3 SERPL-SCNC: 25 MMOL/L (ref 22–29)
POTASSIUM SERPL-SCNC: 2.8 MMOL/L (ref 3.4–5.3)
PROT SERPL-MCNC: 6.4 G/DL (ref 6.4–8.3)
PSA SERPL DL<=0.01 NG/ML-MCNC: <0.01 NG/ML (ref 0–6.5)
SODIUM SERPL-SCNC: 138 MMOL/L (ref 135–145)

## 2025-06-23 PROCEDURE — 96402 CHEMO HORMON ANTINEOPL SQ/IM: CPT | Performed by: INTERNAL MEDICINE

## 2025-06-23 PROCEDURE — 84153 ASSAY OF PSA TOTAL: CPT | Performed by: INTERNAL MEDICINE

## 2025-06-23 PROCEDURE — 84155 ASSAY OF PROTEIN SERUM: CPT | Performed by: INTERNAL MEDICINE

## 2025-06-23 PROCEDURE — 36415 COLL VENOUS BLD VENIPUNCTURE: CPT | Performed by: INTERNAL MEDICINE

## 2025-06-23 PROCEDURE — 99213 OFFICE O/P EST LOW 20 MIN: CPT | Mod: 25 | Performed by: INTERNAL MEDICINE

## 2025-06-23 PROCEDURE — 77385 HC IMRT TREATMENT DELIVERY, SIMPLE: CPT | Performed by: RADIOLOGY

## 2025-06-23 PROCEDURE — 99214 OFFICE O/P EST MOD 30 MIN: CPT | Performed by: INTERNAL MEDICINE

## 2025-06-23 PROCEDURE — 250N000011 HC RX IP 250 OP 636: Mod: JZ | Performed by: INTERNAL MEDICINE

## 2025-06-23 RX ADMIN — LEUPROLIDE ACETATE 22.5 MG: KIT at 11:01

## 2025-06-23 ASSESSMENT — PAIN SCALES - GENERAL: PAINLEVEL_OUTOF10: NO PAIN (0)

## 2025-06-23 NOTE — LETTER
6/23/2025      Edd Bazan  [address]  [facility] [address]      Dear Colleague,    Thank you for referring your patient, Edd Bazan, to the [facility]. Please see a copy of my visit note below.      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  Patient presents with an elevated PSA of 24.3 ng/mL (07/01/2024), PSA 31.59 (07/29/2024).  MRI prostate 08/08/2024, revealed the prostate measuring 6.4 x 5.8 x 3.7 cm with a volume of 71 g.  There was a 14 mm T2 heterogeneous lesion in the 6:00 to 7:00 position in the right apex peripheral zone with 6/15 mm capsular abutment and capsular irregularity (PI-RADS 4) designated as lesion 1.  There was a 10 mm, T2 heterogeneous lesion in the 4:00 to 5:00 position in the left apex peripheral zone with 6/15 mm capsular abutment (PI-RADS 4) designated as lesion 2.  The left neurovascular bundle was abutted by lesion 1.  There was no seminal vesicle involvement.  There was no lymphadenopathy.  There were areas of increased enhancement along the inferior pubic ramus.  [facility] MRI-ultrasound-guided prostate core needle biopsy 10/04/2024, revealed a [place] 3+4=7 acinar adenocarcinoma with perineural invasion involving 50% of 2 of 2 biopsy samples with perineural invasion from the right mid gland, 56% of 2 of 2 biopsy samples with perineural invasion from the right apex, and 78 % of 2 of 2 biopsy samples with perineural invasion from lesion 1 in the right apex peripheral zone.  There was a [place] 3+3=6 acinar adenocarcinoma without perineural invasion involving 4% of 1 of 2 core needle biopsy samples from the left apex, and 33% of 2 of 2 biopsy samples with perineural invasion from the right base.  There was atypical  small acinar proliferation (ASAP) in 2 of 2 core needle biopsy samples from lesion 2 in the in the left apex peripheral zone.  There was benign prostatic tissue in 2 of 2 core needle biopsy samples from the left base and left mid gland.  Patient underwent robotic-assisted laparoscopic radical prostatectomy and right pelvic lymphadenectomy 12/05/2024, that revealed a 3.9 cm, Robin 3+4=7 acinar adenocarcinoma involving 31-40% of the prostate.  There was focal extraprostatic extension involving the right lateral prostate.  There was perineural, but not lymphovascular invasion.  There was no urinary bladder neck or seminal vesicle invasion.  There was multifocal involvement of the right lateral, left apical, and left lateral surgical margins with invasive carcinoma.  Two of 5 right pelvic lymph nodes were positive for metastases with the largest focus of metastasis measuring 0.5 cm, without extranodal extension.  Left pelvic lymph node dissection was not performed due to a large left inguinal hernia.  Left inguinal hernia repair was performed 01/31/2025. Germline cancer gene mutation panel ([employer]) 03/17/2025, was negative for pathogenic variants. 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  Microsatellite instability status was MS-stable.  Tumor mutational burden was low at 4 Muts/Mb. gLOH was low at 4%.  Restaging CT chest, abdomen, pelvis 01/10/2025, was negative for adenopathy or metastases.  There were postsurgical changes related to prior prostatectomy and right pelvic lymph node dissection.  There were collections of mild redness enhancing collections on either side of the vesicourethral anastomosis measuring 1.3 x 2.1 x 1.4 cm on the right, and 5 x 1.9 x 1.6 cm on the left likely representing postoperative seromas.  There was a left indirect inguinal hernia containing fat and sigmoid  colon.  There were chronic posttraumatic osseous deformities and heterotopic ossification throughout the pelvis with surgical hardware.  There was chronic posterior lateral right rib fracture deformities with multifocal intercostal osseous bridging. Bone scan 01/10/2025, was negative for metastases. There was multilevel degenerative uptake in the spine with bridging osteophytes corresponding to CT findings, heterogeneous uptake in the pelvic bones corresponding to postoperative change and new bone formation corresponding to CT findings, and bridging ossification of multiple right lateral ribs. There was significant degenerative uptake in the shoulders, left wrist, and bilateral knees.     Patient is recovered from prostatectomy and hernia repair surgery.  Patient is receiving long-term androgen deprivation therapy (ADT) consisting of leuprolide 22.5 mg every 3 months and abiraterone 1000 mg daily on an empty stomach plus prednisone 5 mg daily with breakfast x2 years beginning 03/21/2025, concurrent with adjuvant radiation therapy to the prostate bed and pelvic lymph nodes beginning 05/05/2025.  Radiation therapy will be completed tomorrow.  Patient has mild fatigue and loose bowel movements 2-3 times a day not requiring Imodium.  There are no other new symptoms since the last clinic visit 05/05/2025. Patient feels well and denies fever, anorexia, weight loss, headache, cough, dyspnea, chest pain, abdominal pain, nausea, vomiting, constipation, diarrhea, bleeding, or bone pain.     PAST HISTORY: Past history was reviewed and unchanged from the clinic note 12/30/2024.     MEDICATIONS:   Current Outpatient Medications   Medication Sig Dispense Refill     abiraterone (ZYTIGA) 250 MG tablet Take 4 tablets (1,000 mg) by mouth daily for 30 doses. Take on empty stomach. 120 tablet 0     aspirin 81 MG EC tablet Take 1 tablet (81 mg) by mouth daily 90 tablet 3     atorvastatin (LIPITOR) 10 MG tablet Take 1 tablet (10 mg) by  "mouth daily. 90 tablet 3     Docusate Calcium (STOOL SOFTENER PO) Take by mouth as needed.       lisinopril-hydrochlorothiazide (ZESTORETIC) 20-25 MG tablet Take 1 tablet by mouth daily. 90 tablet 3     potassium chloride pelon ER (KLOR-CON M10) 10 MEQ CR tablet Take 1 tablet (10 mEq) by mouth daily. 30 tablet 0     predniSONE (DELTASONE) 5 MG tablet Take 1 tablet (5 mg) by mouth daily (with breakfast). 30 tablet 0     senna-docusate (SENOKOT-S/PERICOLACE) 8.6-50 MG tablet Take 1 tablet by mouth daily. 30 tablet 0     tadalafil (CIALIS) 5 MG tablet Take 1 tablet (5 mg) by mouth every 24 hours. 30 tablet 3       REVIEW OF SYSTEMS: Review of systems reviewed with the patient and otherwise negative except for those detailed above.    PHYSICAL EXAM: /70   Pulse 83   Temp 98.1  F (36.7  C) (Oral)   Resp 16   Ht 1.702 m (5' 7.01\")   Wt 86.8 kg (191 lb 4.8 oz)   SpO2 99%   BMI 29.95 kg/m    ECOG performance status: 0. Physical exam was unchanged from prior clinic visit 05/05/2025.  Skin: No erythema or rash.  HEENT: Sclera nonicteric. Oropharynx without lesions or ulceration, mucosa pink and moist.  Nodes: No cervical, supraclavicular, axillary, or inguinal adenopathy.  Lungs: No dullness to percussion.  No rales, wheezes, rhonchi.  Heart: Regular rate and rhythm.  Abdomen: Bowel sounds present.  Soft, nontender, no hepatosplenomegaly or mass.  Extremities: No edema.     LABORATORY REVIEWED:  Component      Latest Ref Rng 2/7/2025  12:41 PM 4/21/2025  10:12 AM 5/5/2025  7:55 AM 6/23/2025  10:27 AM   Sodium      135 - 145 mmol/L    138    Potassium      3.4 - 5.3 mmol/L    2.8 (L)    Carbon Dioxide (CO2)      22 - 29 mmol/L    25    Anion Gap      7 - 15 mmol/L    12    Urea Nitrogen      8.0 - 23.0 mg/dL    21.0    Creatinine      0.67 - 1.17 mg/dL    0.71    GFR Estimate      >60 mL/min/1.73m2    >90    Calcium      8.8 - 10.4 mg/dL    9.2    Chloride      98 - 107 mmol/L    101    Glucose      70 - 99 mg/dL   "  130 (H)    Alkaline Phosphatase      40 - 150 U/L    72    AST      0 - 45 U/L    19    ALT      0 - 70 U/L    28    Protein Total      6.4 - 8.3 g/dL    6.4    Albumin      3.5 - 5.2 g/dL    4.1    Bilirubin Total      <=1.2 mg/dL    0.4    PSA Tumor Marker      0.00 - 6.50 ng/mL 0.02  <0.01  <0.01  <0.01    Testosterone Total      240 - 950 ng/dL  <2 (L)          IMPRESSION/PLAN:.Stage GINI adenocarcinoma of the prostate.  Prostate cancer is a 3.9 cm, Robin 3+4=7 acinar adenocarcinoma involving 31-40% of the prostate with focal extraprostatic extension, multifocal involvement of the surgical margins, and metastases to 2 of 5 right pelvic lymph nodes without extranodal extension. Patient has stage IV prostate cancer and germline cancer gene mutation panel ([employer]), and somatic next generation sequencing ([facility]) on the prostatectomy sample (12/05/2024) is pending to evaluate for mutations and biomarkers that can direct effective therapies.   Patient underwent definitive surgery (12/05/2024).  Left pelvic lymph node dissection was not performed due to the large left inguinal hernia.  Patient underwent left inguinal herniorrhaphy (01/31/2025).  Due to the high likelihood of left pelvic lymph node metastases, patient will receive long-term ADT consisting of leuprolide 22.5 mg every 3 months and abiraterone 1000 mg daily on an empty stomach plus prednisone 5 mg daily with breakfast x2 years beginning 03/21/2025, concurrent with adjuvant radiation therapy to the prostate bed and pelvic lymph nodes.  There is grade 1 fatigue, loose bowel movements, and anemia.  Patient has hypokalemia related to loose bowel movements, and he will take KCl 10 mEq BID for the next 3 days, then resume daily dosing thereafter.  Abiraterone/leuprolide will be continued without modification.  The next dose of leuprolide will be administered today.  I reviewed the risk and side effects of abiraterone with the patient, which is associated with  fatigue, hot flashes, fever, cognitive difficulty, depression, rash, abdominal pain, nausea, vomiting, diarrhea, constipation, myalgias, arthralgias, edema, falls, fractures, adrenal insufficiency, hypertension, arrhythmia, myocardial infarction, heart failure, hepatotoxicity, hyperlipidemia, anemia.  Leuprolide is associated with fatigue, hot flashes, weakness, loss of muscle mass, weight gain, forgetfulness, depression, irritability breast enlargement, loss of libido, impotence, joint stiffness and pain, coronary artery disease, osteoporosis, and bone fracture.  Patient understood the indication and risks and agreed to continue therapy.  Patient will return to clinic in 3 months with CBC, metabolic panel, PSA, and for the next leuprolide injection.  The current and past history, clinical evaluation, reviewing diagnostic tests with the patient, and assessment and planning occurred over 30 minutes.       Pascual Whelan MD    cc: MD Garth Diehl MD Clayton Chen, MD    Again, thank you for allowing me to participate in the care of your patient.        Sincerely,        Pascual Whelan MD    Electronically signed

## 2025-06-23 NOTE — NURSING NOTE
"Oncology Rooming Note    June 23, 2025 10:33 AM   Edd Bazan is a 70 year old male who presents for:    Chief Complaint   Patient presents with    Blood Draw     Labs drawn via  by RN in lab.  VS taken    Oncology Clinic Visit     Prostate cancer     Initial Vitals: /70   Pulse 83   Temp 98.1  F (36.7  C) (Oral)   Resp 16   Ht 1.702 m (5' 7.01\")   Wt 86.8 kg (191 lb 4.8 oz)   SpO2 99%   BMI 29.95 kg/m   Estimated body mass index is 29.95 kg/m  as calculated from the following:    Height as of this encounter: 1.702 m (5' 7.01\").    Weight as of this encounter: 86.8 kg (191 lb 4.8 oz). Body surface area is 2.03 meters squared.  No Pain (0) Comment: Data Unavailable   No LMP for male patient.  Allergies reviewed: Yes  Medications reviewed: Yes    Medications: Medication refills not needed today.  Pharmacy name entered into [employer]:    [id number] [place] [place] - [address] - [place], [place] - [address] [facility]    Frailty Screening:   Is the patient here for a new oncology consult visit in cancer care? 2. No    PHQ9:  Did this patient require a PHQ9?: No      Clinical concerns:  none      Anamaria Kong              "

## 2025-06-23 NOTE — NURSING NOTE
Patient was given Lupron in Right  ventrogluteal muscle. Patient tolerated well and was discharged. See MAR for details.    Chris Edmonds

## 2025-06-23 NOTE — NURSING NOTE
Chief Complaint   Patient presents with    Blood Draw     Labs drawn via  by RN in lab.  VS taken       Labs collected from venipuncture by RN. Vitals taken. Checked in for appointment(s).    Promise Moulton RN

## 2025-06-24 ENCOUNTER — APPOINTMENT (OUTPATIENT)
Dept: RADIATION ONCOLOGY | Facility: CLINIC | Age: 70
End: 2025-06-24
Attending: UROLOGY
Payer: COMMERCIAL

## 2025-06-24 DIAGNOSIS — C61 PROSTATE CANCER (H): ICD-10-CM

## 2025-06-24 DIAGNOSIS — C77.5 METASTASIS TO ILIAC LYMPH NODE (H): ICD-10-CM

## 2025-06-24 PROCEDURE — 77014 PR CT GUIDE FOR PLACEMENT RADIATION THERAPY FIELDS: CPT | Mod: 26 | Performed by: STUDENT IN AN ORGANIZED HEALTH CARE EDUCATION/TRAINING PROGRAM

## 2025-06-24 PROCEDURE — 77385 HC IMRT TREATMENT DELIVERY, SIMPLE: CPT | Performed by: RADIOLOGY

## 2025-06-24 PROCEDURE — 77427 RADIATION TX MANAGEMENT X5: CPT | Mod: GC | Performed by: RADIOLOGY

## 2025-06-24 PROCEDURE — 77336 RADIATION PHYSICS CONSULT: CPT | Performed by: RADIOLOGY

## 2025-06-24 RX ORDER — ABIRATERONE ACETATE 250 MG/1
TABLET ORAL
Qty: 120 TABLET | Refills: 0 | OUTPATIENT
Start: 2025-06-24

## 2025-07-01 DIAGNOSIS — E78.5 HYPERLIPIDEMIA WITH TARGET LDL LESS THAN 100: ICD-10-CM

## 2025-07-01 RX ORDER — ASPIRIN 81 MG/1
81 TABLET, COATED ORAL DAILY
Qty: 90 TABLET | Refills: 2 | Status: SHIPPED | OUTPATIENT
Start: 2025-07-01

## 2025-07-03 DIAGNOSIS — C77.5 METASTASIS TO ILIAC LYMPH NODE (H): Primary | ICD-10-CM

## 2025-07-03 DIAGNOSIS — C61 PROSTATE CANCER (H): ICD-10-CM

## 2025-07-03 RX ORDER — PREDNISONE 5 MG/1
5 TABLET ORAL
Qty: 30 TABLET | Refills: 0 | Status: SHIPPED | OUTPATIENT
Start: 2025-07-03 | End: 2025-08-02

## 2025-07-03 RX ORDER — ABIRATERONE ACETATE 250 MG/1
1000 TABLET ORAL DAILY
Qty: 120 TABLET | Refills: 0 | Status: SHIPPED | OUTPATIENT
Start: 2025-07-03 | End: 2025-08-02

## 2025-07-08 DIAGNOSIS — Z79.899 ENCOUNTER FOR LONG-TERM (CURRENT) USE OF HIGH-RISK MEDICATION: ICD-10-CM

## 2025-07-08 RX ORDER — POTASSIUM CHLORIDE 750 MG/1
10 TABLET, EXTENDED RELEASE ORAL DAILY
Qty: 90 TABLET | Refills: 3 | Status: SHIPPED | OUTPATIENT
Start: 2025-07-08

## 2025-07-16 RX ORDER — TADALAFIL 5 MG/1
5 TABLET ORAL EVERY 24 HOURS
Qty: 30 TABLET | Refills: 3 | OUTPATIENT
Start: 2025-07-16

## 2025-07-28 ENCOUNTER — LAB (OUTPATIENT)
Dept: LAB | Facility: CLINIC | Age: 70
End: 2025-07-28
Payer: COMMERCIAL

## 2025-07-28 DIAGNOSIS — C61 PROSTATE CANCER (H): ICD-10-CM

## 2025-07-28 DIAGNOSIS — Z79.899 ENCOUNTER FOR LONG-TERM (CURRENT) USE OF MEDICATIONS: ICD-10-CM

## 2025-07-28 LAB
ALBUMIN SERPL BCG-MCNC: 4.4 G/DL (ref 3.5–5.2)
ALP SERPL-CCNC: 73 U/L (ref 40–150)
ALT SERPL W P-5'-P-CCNC: 12 U/L (ref 0–70)
ANION GAP SERPL CALCULATED.3IONS-SCNC: 12 MMOL/L (ref 7–15)
AST SERPL W P-5'-P-CCNC: 14 U/L (ref 0–45)
BILIRUB SERPL-MCNC: 0.7 MG/DL
BUN SERPL-MCNC: 16.3 MG/DL (ref 8–23)
CALCIUM SERPL-MCNC: 9.8 MG/DL (ref 8.8–10.4)
CHLORIDE SERPL-SCNC: 101 MMOL/L (ref 98–107)
CREAT SERPL-MCNC: 0.75 MG/DL (ref 0.67–1.17)
EGFRCR SERPLBLD CKD-EPI 2021: >90 ML/MIN/1.73M2
GLUCOSE SERPL-MCNC: 104 MG/DL (ref 70–99)
HCO3 SERPL-SCNC: 26 MMOL/L (ref 22–29)
POTASSIUM SERPL-SCNC: 3.4 MMOL/L (ref 3.4–5.3)
PROT SERPL-MCNC: 7.1 G/DL (ref 6.4–8.3)
SODIUM SERPL-SCNC: 139 MMOL/L (ref 135–145)

## 2025-07-28 PROCEDURE — 80053 COMPREHEN METABOLIC PANEL: CPT

## 2025-07-28 PROCEDURE — 36415 COLL VENOUS BLD VENIPUNCTURE: CPT

## 2025-07-29 ENCOUNTER — TELEPHONE (OUTPATIENT)
Dept: ONCOLOGY | Facility: CLINIC | Age: 70
End: 2025-07-29
Payer: COMMERCIAL

## 2025-07-29 NOTE — TELEPHONE ENCOUNTER
Oral Chemotherapy Monitoring Program  Lab Follow Up    Reviewed lab results from 7/28.        6/6/2025    12:00 PM 6/9/2025     2:00 PM 6/12/2025     1:00 PM 6/12/2025     3:00 PM 6/13/2025    11:00 AM 7/3/2025     1:00 PM 7/29/2025     3:00 PM   ORAL CHEMOTHERAPY   Assessment Type Incoming phone call;Monthly Follow up Monthly Follow up Lab Monitoring Lab Monitoring;Left Voicemail Incoming phone call Refill Lab Monitoring;Left Voicemail   Diagnosis Code Prostate Cancer Prostate Cancer Prostate Cancer Prostate Cancer Prostate Cancer Prostate Cancer Prostate Cancer   Providers [facility]   [facility]   Is this patient followed by the [facility] OC team? No No No No No No No   Drug Name Zytiga (abiraterone)  Zytiga (abiraterone)  Zytiga (abiraterone)  Zytiga (abiraterone)  Zytiga (abiraterone)  Zytiga (abiraterone)  Zytiga (abiraterone)   Dose 1,000 mg  1,000 mg  1,000 mg 1,000 mg 1,000 mg 1,000 mg 1,000 mg   Current Schedule Daily  Daily  Daily Daily Daily Daily Daily   Cycle Details Continuous Continuous Continuous Continuous Continuous Continuous Continuous   Doses missed in last 2 weeks 0 0        Adherence Assessment Adherent Adherent        Adverse Effects No AE identified during assessment No AE identified during assessment     No AE identified during assessment   Home BPs all BPs<140/90         Any new drug interactions? No         Is the dose as ordered appropriate for the patient? Yes         Has the patient missed any days of school, work, or other routine activity? No         Since the last time we talked, have you been hospitalized or used the emergency room? No         Was a medication prescribed as part of a CPA? No             Data saved with a previous flowsheet row definition       Labs:  _  Result Component Current Result Ref Range   Sodium 139 (7/28/2025) 135 - 145 mmol/L     _  Result Component  Current Result Ref Range   Potassium 3.4 (7/28/2025) 3.4 - 5.3 mmol/L     _  Result Component Current Result Ref Range   Calcium 9.8 (7/28/2025) 8.8 - 10.4 mg/dL     No results found for Mag within last 30 days.     No results found for Phos within last 30 days.     _  Result Component Current Result Ref Range   Albumin 4.4 (7/28/2025) 3.5 - 5.2 g/dL     _  Result Component Current Result Ref Range   Urea Nitrogen 16.3 (7/28/2025) 8.0 - 23.0 mg/dL     _  Result Component Current Result Ref Range   Creatinine 0.75 (7/28/2025) 0.67 - 1.17 mg/dL     _  Result Component Current Result Ref Range   AST 14 (7/28/2025) 0 - 45 U/L     _  Result Component Current Result Ref Range   ALT 12 (7/28/2025) 0 - 70 U/L     _  Result Component Current Result Ref Range   Bilirubin Total 0.7 (7/28/2025) <=1.2 mg/dL     No results found for WBC within last 30 days.     No results found for HGB within last 30 days.     No results found for PLT within last 30 days.     No results found for ANC within last 30 days.     No results found for ANC within last 30 days.        Assessment & Plan:  No concerning abnormalities.    LVM for Edd to let him know that we have reviewed his labs from yesterday.    No patient information was discussed in the voicemail.    We will try to reach out again tomorrow to discuss his results.     Follow-Up:  Next labs: 8/25  Next onc appt: 9/22 with Dr. Tip Diaz  Pharmacy Intern  Oral Chemotherapy Monitoring Program  [facility]  [phone]

## 2025-07-30 ENCOUNTER — TELEPHONE (OUTPATIENT)
Dept: ONCOLOGY | Facility: CLINIC | Age: 70
End: 2025-07-30
Payer: COMMERCIAL

## 2025-07-30 NOTE — TELEPHONE ENCOUNTER
Oral Chemotherapy Monitoring Program  Lab Follow Up    Reviewed lab results from 7/28.        6/9/2025     2:00 PM 6/12/2025     1:00 PM 6/12/2025     3:00 PM 6/13/2025    11:00 AM 7/3/2025     1:00 PM 7/29/2025     3:00 PM 7/30/2025    10:00 AM   ORAL CHEMOTHERAPY   Assessment Type Monthly Follow up Lab Monitoring Lab Monitoring;Left Voicemail Incoming phone call Refill Lab Monitoring;Left Voicemail Lab Monitoring;Left Voicemail   Diagnosis Code Prostate Cancer Prostate Cancer Prostate Cancer Prostate Cancer Prostate Cancer Prostate Cancer Prostate Cancer   Providers [facility]   [facility]   Is this patient followed by the [facility] OC team? No No No No No No No   Drug Name Zytiga (abiraterone)  Zytiga (abiraterone)  Zytiga (abiraterone)  Zytiga (abiraterone)  Zytiga (abiraterone)  Zytiga (abiraterone) Zytiga (abiraterone)   Dose 1,000 mg  1,000 mg 1,000 mg 1,000 mg 1,000 mg 1,000 mg 1,000 mg   Current Schedule Daily  Daily Daily Daily Daily Daily Daily   Cycle Details Continuous Continuous Continuous Continuous Continuous Continuous Continuous   Doses missed in last 2 weeks 0         Adherence Assessment Adherent         Adverse Effects No AE identified during assessment     No AE identified during assessment        Data saved with a previous flowsheet row definition       Labs:  _  Result Component Current Result Ref Range   Sodium 139 (7/28/2025) 135 - 145 mmol/L     _  Result Component Current Result Ref Range   Potassium 3.4 (7/28/2025) 3.4 - 5.3 mmol/L     _  Result Component Current Result Ref Range   Calcium 9.8 (7/28/2025) 8.8 - 10.4 mg/dL     No results found for Mag within last 30 days.     No results found for Phos within last 30 days.     _  Result Component Current Result Ref Range   Albumin 4.4 (7/28/2025) 3.5 - 5.2 g/dL     _  Result Component Current Result Ref Range   Urea Nitrogen 16.3  (7/28/2025) 8.0 - 23.0 mg/dL     _  Result Component Current Result Ref Range   Creatinine 0.75 (7/28/2025) 0.67 - 1.17 mg/dL     _  Result Component Current Result Ref Range   AST 14 (7/28/2025) 0 - 45 U/L     _  Result Component Current Result Ref Range   ALT 12 (7/28/2025) 0 - 70 U/L     _  Result Component Current Result Ref Range   Bilirubin Total 0.7 (7/28/2025) <=1.2 mg/dL     No results found for WBC within last 30 days.     No results found for HGB within last 30 days.     No results found for PLT within last 30 days.     No results found for ANC within last 30 days.     No results found for ANC within last 30 days.        Assessment & Plan:  No concerning abnormalities.    I left a voicemail for Edd with a review of his lab results and told him to continue to take his abiraterone (Zytiga) as prescribed.    Follow-Up:  Next labs: 8/25  Next oncology appt: 9/22 with Dr. Tip Diaz  Pharmacy Intern  Oral Chemotherapy Monitoring Program  [facility]  [phone]

## 2025-08-25 ENCOUNTER — LAB (OUTPATIENT)
Dept: LAB | Facility: CLINIC | Age: 70
End: 2025-08-25
Payer: COMMERCIAL

## 2025-08-25 DIAGNOSIS — C77.5 METASTASIS TO ILIAC LYMPH NODE (H): ICD-10-CM

## 2025-08-25 DIAGNOSIS — C61 PROSTATE CANCER (H): ICD-10-CM

## 2025-08-25 RX ORDER — ABIRATERONE ACETATE 250 MG/1
TABLET ORAL
Qty: 120 TABLET | Refills: 0 | Status: SHIPPED | OUTPATIENT
Start: 2025-08-25

## 2025-08-26 ENCOUNTER — TELEPHONE (OUTPATIENT)
Dept: ONCOLOGY | Facility: CLINIC | Age: 70
End: 2025-08-26
Payer: COMMERCIAL

## (undated) DEVICE — SOL NACL 0.9% INJ 1000ML BAG 2B1324X

## (undated) DEVICE — SU VICRYL 2-0 CT-2 27" UND J269H

## (undated) DEVICE — SU WND CLOSURE V-LOC 3-0 CV-23 6" VLOCM1904

## (undated) DEVICE — CLEANER INST PRE-KLENZ SOAK SHIELD TUBE 6 ML MEDIUM 2D66J4

## (undated) DEVICE — SURGICEL HEMOSTAT 2X14" 1951

## (undated) DEVICE — SU WND CLOSURE VLOC 90 ABS 3-0 VIOLET 6" CV-23 VLOCM0804

## (undated) DEVICE — DRAIN JACKSON PRATT CHANNEL 19FR ROUND HUBLESS SIL JP-2230

## (undated) DEVICE — PACK MINOR CUSTOM ASC

## (undated) DEVICE — TAPE MEDIPORE 4"X2YD 2860S-4U

## (undated) DEVICE — PROTECTOR ARM ONE-STEP TRENDELENBURG 40418

## (undated) DEVICE — GLOVE BIOGEL PI ULTRATOUCH SZ 6.0 41160

## (undated) DEVICE — ENDO TROCAR CONMED AIRSEAL BLADELESS 12X120MM IAS12-120LP

## (undated) DEVICE — GLOVE BIOGEL PI MICRO INDICATOR UNDERGLOVE SZ 7.5 48975

## (undated) DEVICE — RX SURGIFLO HEMOSTATIC MATRIX W/THROMBIN 8ML 2994

## (undated) DEVICE — RULER SURGICAL PLASTIC STRL LF CS628

## (undated) DEVICE — PREP CHLORAPREP 26ML TINTED HI-LITE ORANGE 930815

## (undated) DEVICE — TAPE DURAPORE 3" SILK 1538-3

## (undated) DEVICE — SOL WATER IRRIG 1000ML BOTTLE 2F7114

## (undated) DEVICE — DRAPE LAP W/ARMBOARD 29410

## (undated) DEVICE — DRSG GAUZE 4X4" 3033

## (undated) DEVICE — CATH TRAY FOLEY SURESTEP 16FR WDRAIN BAG STLK LATEX A300316A

## (undated) DEVICE — SU MONOCRYL 4-0 PS-2 27" UND Y426H

## (undated) DEVICE — TUBING CONMED AIRSEAL SMOKE EVAC INSUFFLATION ASM-EVAC

## (undated) DEVICE — ESU GROUND PAD UNIVERSAL W/O CORD

## (undated) DEVICE — LINEN TOWEL PACK X5 5464

## (undated) DEVICE — ESU ENDO SCISSORS 5MM CVD 5DCS

## (undated) DEVICE — KIT PATIENT POSITIONING PIGAZZI LATEX FREE 40580

## (undated) DEVICE — SU VICRYL+ 0 27IN CT-2 VLT VCP334H

## (undated) DEVICE — Device

## (undated) DEVICE — SU VICRYL+ 0 27 UR6 VLT VCP603H

## (undated) DEVICE — SUCTION IRR STRYKERFLOW II W/TIP 250-070-520

## (undated) DEVICE — DAVINCI XI SEAL UNIVERSAL 5-12MM 470500

## (undated) DEVICE — ESU GROUND PAD ADULT W/CORD E7507

## (undated) DEVICE — DRSG STERI STRIP 1/2X4" R1547

## (undated) DEVICE — NDL INSUFFLATION 13GA 120MM C2201

## (undated) DEVICE — SU ETHILON 2-0 FS 18" 664H

## (undated) DEVICE — GLOVE BIOGEL PI MICRO SZ 7.5 48575

## (undated) DEVICE — DAVINCI HOT SHEARS TIP COVER  400180

## (undated) DEVICE — SOL NACL 0.9% IRRIG 1000ML BOTTLE 07138-09

## (undated) DEVICE — GOWN XXLG REINFORCED 9071EL

## (undated) DEVICE — GLOVE BIOGEL PI MICRO SZ 7.0 48570

## (undated) DEVICE — GLOVE BIOGEL PI MICRO SZ 6.5 48565

## (undated) DEVICE — SU MONOCRYL 4-0 PS-2 18" UND Y496G

## (undated) DEVICE — DECANTER BAG 2002S

## (undated) DEVICE — PACK DAVINCI UROLOGY SBA15UDFSG

## (undated) DEVICE — SU ETHILON 2-0 FS 18" 664G

## (undated) DEVICE — DRAIN PENROSE 0.25"X18" LATEX FREE GR201

## (undated) DEVICE — CLIP ENDO HEMO-LOC PURPLE LG 544240

## (undated) DEVICE — SPONGE RAY-TEC 4X8" 7318

## (undated) DEVICE — SU VICRYL 0 UR-6 27" J603H

## (undated) DEVICE — DAVINCI XI DRAPE COLUMN 470341

## (undated) DEVICE — BLADE KNIFE SURG 10 371110

## (undated) DEVICE — SUPPORTER ATHLETIC LG LATEX 202636

## (undated) DEVICE — SOL NACL 0.9% IRRIG 500ML BOTTLE 2F7123

## (undated) DEVICE — ANTIFOG SOLUTION SEE SHARP 150M TROCAR SWABS 30978 (COI)

## (undated) DEVICE — DRAIN JACKSON PRATT RESERVOIR 100ML SU130-1305

## (undated) DEVICE — GLOVE BIOGEL PI MICRO INDICATOR UNDERGLOVE SZ 6.5 48965

## (undated) DEVICE — ENDO TROCAR FIRST ENTRY KII FIOS Z-THRD 05X100MM CTF03

## (undated) DEVICE — ENDO POUCH UNIV RETRIEVAL SYSTEM INZII 10MM CD001

## (undated) DEVICE — SU DERMABOND ADVANCED .7ML DNX12

## (undated) DEVICE — DAVINCI XI DRAPE ARM 470015

## (undated) RX ORDER — FENTANYL CITRATE 50 UG/ML
INJECTION, SOLUTION INTRAMUSCULAR; INTRAVENOUS
Status: DISPENSED
Start: 2024-12-05

## (undated) RX ORDER — PROPOFOL 10 MG/ML
INJECTION, EMULSION INTRAVENOUS
Status: DISPENSED
Start: 2024-12-05

## (undated) RX ORDER — FENTANYL CITRATE 50 UG/ML
INJECTION, SOLUTION INTRAMUSCULAR; INTRAVENOUS
Status: DISPENSED
Start: 2021-08-09

## (undated) RX ORDER — DEXAMETHASONE SODIUM PHOSPHATE 4 MG/ML
INJECTION, SOLUTION INTRA-ARTICULAR; INTRALESIONAL; INTRAMUSCULAR; INTRAVENOUS; SOFT TISSUE
Status: DISPENSED
Start: 2024-12-05

## (undated) RX ORDER — PROPOFOL 10 MG/ML
INJECTION, EMULSION INTRAVENOUS
Status: DISPENSED
Start: 2025-01-31

## (undated) RX ORDER — CIPROFLOXACIN 500 MG/1
TABLET, FILM COATED ORAL
Status: DISPENSED
Start: 2024-12-18

## (undated) RX ORDER — HYDROMORPHONE HYDROCHLORIDE 1 MG/ML
INJECTION, SOLUTION INTRAMUSCULAR; INTRAVENOUS; SUBCUTANEOUS
Status: DISPENSED
Start: 2024-12-05

## (undated) RX ORDER — FENTANYL CITRATE 50 UG/ML
INJECTION, SOLUTION INTRAMUSCULAR; INTRAVENOUS
Status: DISPENSED
Start: 2018-07-17

## (undated) RX ORDER — ACETAMINOPHEN 325 MG/1
TABLET ORAL
Status: DISPENSED
Start: 2025-01-31

## (undated) RX ORDER — ACETAMINOPHEN 325 MG/1
TABLET ORAL
Status: DISPENSED
Start: 2024-12-05

## (undated) RX ORDER — FENTANYL CITRATE 50 UG/ML
INJECTION, SOLUTION INTRAMUSCULAR; INTRAVENOUS
Status: DISPENSED
Start: 2025-01-31

## (undated) RX ORDER — HEPARIN SODIUM 5000 [USP'U]/.5ML
INJECTION, SOLUTION INTRAVENOUS; SUBCUTANEOUS
Status: DISPENSED
Start: 2024-12-05

## (undated) RX ORDER — ONDANSETRON 2 MG/ML
INJECTION INTRAMUSCULAR; INTRAVENOUS
Status: DISPENSED
Start: 2024-12-05

## (undated) RX ORDER — CEFAZOLIN SODIUM 2 G/50ML
SOLUTION INTRAVENOUS
Status: DISPENSED
Start: 2025-01-31

## (undated) RX ORDER — ONDANSETRON 2 MG/ML
INJECTION INTRAMUSCULAR; INTRAVENOUS
Status: DISPENSED
Start: 2025-01-31